# Patient Record
Sex: MALE | Race: WHITE | Employment: OTHER | URBAN - METROPOLITAN AREA
[De-identification: names, ages, dates, MRNs, and addresses within clinical notes are randomized per-mention and may not be internally consistent; named-entity substitution may affect disease eponyms.]

---

## 2017-01-03 ENCOUNTER — TELEPHONE (OUTPATIENT)
Dept: CARDIOLOGY CLINIC | Age: 82
End: 2017-01-03

## 2017-01-31 ENCOUNTER — OFFICE VISIT (OUTPATIENT)
Dept: CARDIOLOGY CLINIC | Age: 82
End: 2017-01-31

## 2017-01-31 VITALS
DIASTOLIC BLOOD PRESSURE: 64 MMHG | BODY MASS INDEX: 35.65 KG/M2 | HEART RATE: 64 BPM | WEIGHT: 249 LBS | HEIGHT: 70 IN | SYSTOLIC BLOOD PRESSURE: 130 MMHG

## 2017-01-31 DIAGNOSIS — I10 ESSENTIAL HYPERTENSION: ICD-10-CM

## 2017-01-31 DIAGNOSIS — E78.49 OTHER HYPERLIPIDEMIA: ICD-10-CM

## 2017-01-31 DIAGNOSIS — I25.10 CORONARY ARTERY DISEASE INVOLVING NATIVE CORONARY ARTERY OF NATIVE HEART WITHOUT ANGINA PECTORIS: Primary | ICD-10-CM

## 2017-01-31 PROCEDURE — 99214 OFFICE O/P EST MOD 30 MIN: CPT | Performed by: INTERNAL MEDICINE

## 2017-01-31 RX ORDER — CARVEDILOL 25 MG/1
25 TABLET ORAL 2 TIMES DAILY
Qty: 180 TABLET | Refills: 3 | Status: SHIPPED | OUTPATIENT
Start: 2017-01-31 | End: 2018-02-09 | Stop reason: SDUPTHER

## 2017-01-31 RX ORDER — PRAVASTATIN SODIUM 20 MG
20 TABLET ORAL DAILY
Qty: 90 TABLET | Refills: 3 | Status: SHIPPED | OUTPATIENT
Start: 2017-01-31 | End: 2018-02-15 | Stop reason: SDUPTHER

## 2017-01-31 RX ORDER — FUROSEMIDE 20 MG/1
20 TABLET ORAL DAILY
Qty: 90 TABLET | Refills: 3 | Status: SHIPPED | OUTPATIENT
Start: 2017-01-31 | End: 2018-02-15 | Stop reason: SDUPTHER

## 2017-02-10 ENCOUNTER — TELEPHONE (OUTPATIENT)
Dept: CARDIOLOGY CLINIC | Age: 82
End: 2017-02-10

## 2017-02-13 ENCOUNTER — TELEPHONE (OUTPATIENT)
Dept: CARDIOLOGY CLINIC | Age: 82
End: 2017-02-13

## 2017-06-20 RX ORDER — AMLODIPINE BESYLATE 5 MG/1
TABLET ORAL
Qty: 90 TABLET | Refills: 3 | Status: SHIPPED | OUTPATIENT
Start: 2017-06-20 | End: 2018-02-15 | Stop reason: SDUPTHER

## 2017-07-10 ENCOUNTER — OFFICE VISIT (OUTPATIENT)
Dept: CARDIOLOGY CLINIC | Age: 82
End: 2017-07-10

## 2017-07-10 VITALS
HEIGHT: 70 IN | DIASTOLIC BLOOD PRESSURE: 70 MMHG | BODY MASS INDEX: 36.51 KG/M2 | WEIGHT: 255 LBS | HEART RATE: 50 BPM | SYSTOLIC BLOOD PRESSURE: 142 MMHG

## 2017-07-10 DIAGNOSIS — E78.49 OTHER HYPERLIPIDEMIA: ICD-10-CM

## 2017-07-10 DIAGNOSIS — R60.9 EDEMA, UNSPECIFIED TYPE: ICD-10-CM

## 2017-07-10 DIAGNOSIS — I25.10 CORONARY ARTERY DISEASE INVOLVING NATIVE CORONARY ARTERY OF NATIVE HEART WITHOUT ANGINA PECTORIS: ICD-10-CM

## 2017-07-10 DIAGNOSIS — I10 ESSENTIAL HYPERTENSION: Primary | ICD-10-CM

## 2017-07-10 PROCEDURE — 99214 OFFICE O/P EST MOD 30 MIN: CPT | Performed by: INTERNAL MEDICINE

## 2018-01-08 ENCOUNTER — TELEPHONE (OUTPATIENT)
Dept: CARDIOLOGY CLINIC | Age: 83
End: 2018-01-08

## 2018-01-08 DIAGNOSIS — E78.49 OTHER HYPERLIPIDEMIA: Primary | ICD-10-CM

## 2018-01-08 DIAGNOSIS — I10 ESSENTIAL HYPERTENSION: ICD-10-CM

## 2018-01-08 NOTE — TELEPHONE ENCOUNTER
Please advise. . Last OV 7/10/17:  Assessment/Plan:      Cardiovascular Diagnoses:  1. CAD (coronary artery disease): CABG 2007; LIMA to LAD, SVG to diag and SVG to R PDA.    3/2010: GXT/MARTÍNEZ showed normal myocardial perfusion and LVEF.     3/2015 Plain GXT: normal ECG response, no ischemia. 1/17  Plain gxt--no ischemia   2. HTN (hypertension): BP (!) 142/70  Pulse 50  Ht 5' 10\" (1.778 m)  Wt 255 lb (115.7 kg)  BMI 36.59 kg/m2 improved control   3. Hyperlipidemia--1/26/17   Tri 154  hdl 42  ldl 98--stable     .       PLAN:   1. Continue all medications  2. Call for change in status  3.  Follow up in 6 months with Dr Amelia Rodriguez M.D., Junie Pham

## 2018-01-08 NOTE — TELEPHONE ENCOUNTER
Pt called back and I adv orders for lab have been placed. Please fax:  [facility] on [address], [address] Please call to confirm orders faxed.   Thank you [facility]

## 2018-01-22 ENCOUNTER — TELEPHONE (OUTPATIENT)
Dept: CARDIOLOGY CLINIC | Age: 83
End: 2018-01-22

## 2018-02-01 ENCOUNTER — TELEPHONE (OUTPATIENT)
Dept: CARDIOLOGY CLINIC | Age: 83
End: 2018-02-01

## 2018-02-09 RX ORDER — CARVEDILOL 25 MG/1
25 TABLET ORAL 2 TIMES DAILY
Qty: 180 TABLET | Refills: 3 | Status: SHIPPED | OUTPATIENT
Start: 2018-02-09 | End: 2018-05-25 | Stop reason: SDUPTHER

## 2018-02-14 NOTE — PROGRESS NOTES
[facility]   Cardiac Followup    Referring Provider:  Silvino Akins MD     Chief Complaint   Patient presents with    6 Month Follow-Up    Hypertension    Coronary Artery Disease        History of Present Illness:  Mr. Austin Alcantar is an 80 y.o. gentleman here today in follow up. Previous patient of Dr Ken Cano. His history includes CAD having had CABG in 2007. He is also treated for htn, hchol. Adelina Unger denies chest discomfort, SOB, change in exercise tolerance, palpitations or syncope. Past Medical History:   has a past medical history of Anxiety; Arthritis; CAD (coronary artery disease); Cancer [facility]); GERD (gastroesophageal reflux disease); Hyperlipidemia; and Hypertension. Surgical History:   has a past surgical history that includes Appendectomy; Tonsillectomy; Knee arthroscopy; TURP; Coronary angioplasty with stent; Carpal tunnel release; TURP (12-); Cardiac surgery (2007); ostate surgery; and Mohs surgery. Social History:   reports that he quit smoking about 25 years ago. He has a 45.00 pack-year smoking history. He has never used smokeless tobacco. He reports that he drinks alcohol. He reports that he does not use drugs. Family History:  family history includes Cancer in his father; Diabetes in his father; Heart Disease in his mother.      Home Medications:  Current Outpatient Prescriptions   Medication Sig Dispense Refill    glimepiride (AMARYL) 2 MG tablet Take 2 mg by mouth every morning (before breakfast)      carvedilol (COREG) 25 MG tablet Take 1 tablet by mouth 2 times daily 180 tablet 3    amLODIPine (NORVASC) 5 MG tablet TAKE 1 TABLET BY MOUTH DAILY 90 tablet 3    furosemide (LASIX) 20 MG tablet Take 1 tablet by mouth daily (Patient taking differently: Take 40 mg by mouth daily ) 90 tablet 3    pravastatin (PRAVACHOL) 20 MG tablet Take 1 tablet by mouth daily 90 tablet 3    aspirin 325 MG tablet   Take 162 mg by mouth daily 1/2 tab daily breath sounds without dullness  Cardiovascular:  · The apical impulses not displaced  · Heart tones are crisp and normal  · Cervical veins are not engorged  · The carotid upstroke is normal in amplitude and contour without delay or bruit  · Normal S1S2, No S3, No Murmur  · Peripheral pulses are symmetrical and full  · There is no clubbing, cyanosis of the extremities. · 1-2+ edema  · Femoral Arteries: 2+ and equal  · Pedal Pulses: 2+ and equal   Abdomen:  · No masses or tenderness  · Liver/Spleen: No Abnormalities Noted  Neurological/Psychiatric:  · Alert and oriented in all spheres  · Moves all extremities well  · Exhibits normal gait balance and coordination  · No abnormalities of mood, affect, memory, mentation, or behavior are noted    9/20/13  CT of Abd -extensive calcification of the abdominal aorta    Assessment:   Cardiovascular Diagnoses:  1. CAD (coronary artery disease): CABG 2007; LIMA to LAD, SVG to diag and SVG to R PDA.    3/2010: GXT/MARTÍNEZ showed normal myocardial perfusion and LVEF.     3/2015 Plain GXT: normal ECG response, no ischemia. 1/17  Plain gxt--no ischemia   2. HTN (hypertension): BP (!) 154/70 (Site: Left Arm, Position: Sitting, Cuff Size: Large Adult)   Pulse 64   Ht 5' 10\" (1.778 m)   Wt 257 lb 14.4 oz (117 kg)   BMI 37.00 kg/m²      3. Hyperlipidemia--1/26/17   Tri 154  hdl 42  ldl 98--stable--die tp fatigue/muscle aches, will start coenzyme q 10    Plan:  1. Take coenzyme q 10 400 mg daily to decrease fatigue and aches that could be caused by your statin. This can be found at any local pharmacy, no prescription needed  2. Continue all medications  Losartan 50mg/day--D/C Norvasc due to edema  3. Decrease ASA 81mg/day  4. BMP in 2 weeks  3. Follow up in  3 months. Thank you for allowing me to participate in the care of this individual.    Lionel Apley, M.D., [facility].

## 2018-02-15 ENCOUNTER — SURG/PROC ORDERS (OUTPATIENT)
Dept: CARDIOLOGY | Age: 83
End: 2018-02-15

## 2018-02-15 ENCOUNTER — OFFICE VISIT (OUTPATIENT)
Dept: CARDIOLOGY CLINIC | Age: 83
End: 2018-02-15

## 2018-02-15 VITALS
DIASTOLIC BLOOD PRESSURE: 70 MMHG | WEIGHT: 257.9 LBS | HEART RATE: 64 BPM | SYSTOLIC BLOOD PRESSURE: 154 MMHG | HEIGHT: 70 IN | BODY MASS INDEX: 36.92 KG/M2

## 2018-02-15 DIAGNOSIS — I10 ESSENTIAL HYPERTENSION: ICD-10-CM

## 2018-02-15 DIAGNOSIS — E78.49 OTHER HYPERLIPIDEMIA: ICD-10-CM

## 2018-02-15 DIAGNOSIS — M19.90 OSTEOARTHRITIS, UNSPECIFIED OSTEOARTHRITIS TYPE, UNSPECIFIED SITE: ICD-10-CM

## 2018-02-15 DIAGNOSIS — I25.10 CORONARY ARTERY DISEASE INVOLVING NATIVE CORONARY ARTERY OF NATIVE HEART WITHOUT ANGINA PECTORIS: Primary | ICD-10-CM

## 2018-02-15 PROCEDURE — 99214 OFFICE O/P EST MOD 30 MIN: CPT | Performed by: INTERNAL MEDICINE

## 2018-02-15 RX ORDER — AMLODIPINE BESYLATE 5 MG/1
5 TABLET ORAL DAILY
Qty: 90 TABLET | Refills: 3 | Status: SHIPPED | OUTPATIENT
Start: 2018-02-15 | End: 2018-02-15

## 2018-02-15 RX ORDER — PRAVASTATIN SODIUM 20 MG
20 TABLET ORAL DAILY
Qty: 90 TABLET | Refills: 3 | Status: SHIPPED | OUTPATIENT
Start: 2018-02-15 | End: 2019-04-06 | Stop reason: SDUPTHER

## 2018-02-15 RX ORDER — GLIMEPIRIDE 2 MG/1
2 TABLET ORAL
COMMUNITY
End: 2020-02-04

## 2018-02-15 RX ORDER — LOSARTAN POTASSIUM 50 MG/1
50 TABLET ORAL DAILY
Qty: 30 TABLET | Refills: 3 | Status: SHIPPED | OUTPATIENT
Start: 2018-02-15 | End: 2018-04-05 | Stop reason: ALTCHOICE

## 2018-02-15 RX ORDER — CARVEDILOL 25 MG/1
25 TABLET ORAL 2 TIMES DAILY
Qty: 180 TABLET | Refills: 3 | Status: SHIPPED | OUTPATIENT
Start: 2018-02-15 | End: 2018-04-05 | Stop reason: SDUPTHER

## 2018-02-15 RX ORDER — FUROSEMIDE 20 MG/1
40 TABLET ORAL DAILY
Qty: 90 TABLET | Refills: 3 | Status: SHIPPED | OUTPATIENT
Start: 2018-02-15 | End: 2018-08-14 | Stop reason: SDUPTHER

## 2018-02-15 NOTE — PATIENT INSTRUCTIONS
1.  Take coenzyme q 10 400 mg daily to decrease fatigue and aches that could be caused by your statin. This can be found at any local pharmacy, no prescription needed  2. Continue all medications  Losartan 50mg/day--D/C Norvasc  3. Decrease ASA 81mg/day  4. BMP in 2 weeks  3.    Follow up in  3 months

## 2018-04-02 LAB
ALBUMIN SERPL-MCNC: 4.3 G/DL (ref 3.5–5)
ANION GAP SERPL CALCULATED.3IONS-SCNC: 14 MMOL/L (ref 6–18)
BUN BLDV-MCNC: 24 MG/DL (ref 8–26)
CALCIUM SERPL-MCNC: 10 MG/DL (ref 8.8–10.1)
CHLORIDE BLD-SCNC: 101 MEQ/L (ref 101–111)
CO2: 29 MEQ/L (ref 22–29)
CREAT SERPL-MCNC: 1.37 MG/DL (ref 0.64–1.27)
GFR AFRICAN AMERICAN: >60 ML/MIN/1.73 SQ METER
GFR NON-AFRICAN AMERICAN: 50 ML/MIN/1.73 SQ METER
GLUCOSE BLD-MCNC: 279 MG/DL (ref 70–99)
PHOSPHORUS: 3 MG/DL (ref 2.7–4.5)
POTASSIUM SERPL-SCNC: 4.3 MEQ/L (ref 3.6–5.1)
SODIUM BLD-SCNC: 140 MEQ/L (ref 135–145)

## 2018-04-04 ENCOUNTER — TELEPHONE (OUTPATIENT)
Dept: CARDIOLOGY CLINIC | Age: 83
End: 2018-04-04

## 2018-04-05 ENCOUNTER — OFFICE VISIT (OUTPATIENT)
Dept: CARDIOLOGY CLINIC | Age: 83
End: 2018-04-05

## 2018-04-05 VITALS
DIASTOLIC BLOOD PRESSURE: 78 MMHG | BODY MASS INDEX: 36.81 KG/M2 | SYSTOLIC BLOOD PRESSURE: 146 MMHG | HEART RATE: 56 BPM | WEIGHT: 257.1 LBS | HEIGHT: 70 IN

## 2018-04-05 DIAGNOSIS — I25.10 CORONARY ARTERY DISEASE INVOLVING NATIVE CORONARY ARTERY OF NATIVE HEART WITHOUT ANGINA PECTORIS: Primary | ICD-10-CM

## 2018-04-05 DIAGNOSIS — E78.49 OTHER HYPERLIPIDEMIA: ICD-10-CM

## 2018-04-05 DIAGNOSIS — I10 ESSENTIAL HYPERTENSION: ICD-10-CM

## 2018-04-05 PROCEDURE — 99214 OFFICE O/P EST MOD 30 MIN: CPT | Performed by: INTERNAL MEDICINE

## 2018-04-05 RX ORDER — ASPIRIN 325 MG
162 TABLET ORAL DAILY
COMMUNITY
End: 2018-08-02

## 2018-04-05 RX ORDER — AMLODIPINE BESYLATE 5 MG/1
5 TABLET ORAL DAILY
Qty: 30 TABLET | Refills: 3 | Status: SHIPPED | OUTPATIENT
Start: 2018-04-05 | End: 2018-06-21 | Stop reason: DRUGHIGH

## 2018-04-05 RX ORDER — OLMESARTAN MEDOXOMIL 20 MG/1
20 TABLET ORAL DAILY
Qty: 30 TABLET | Refills: 3 | Status: SHIPPED | OUTPATIENT
Start: 2018-04-05 | End: 2018-05-01 | Stop reason: SDUPTHER

## 2018-04-05 RX ORDER — RANITIDINE 300 MG/1
300 TABLET ORAL DAILY
COMMUNITY
End: 2020-02-04

## 2018-05-01 ENCOUNTER — OFFICE VISIT (OUTPATIENT)
Dept: CARDIOLOGY CLINIC | Age: 83
End: 2018-05-01

## 2018-05-01 VITALS
SYSTOLIC BLOOD PRESSURE: 126 MMHG | BODY MASS INDEX: 37.61 KG/M2 | WEIGHT: 262.7 LBS | HEIGHT: 70 IN | HEART RATE: 60 BPM | DIASTOLIC BLOOD PRESSURE: 60 MMHG

## 2018-05-01 DIAGNOSIS — I25.10 CORONARY ARTERY DISEASE INVOLVING NATIVE CORONARY ARTERY OF NATIVE HEART WITHOUT ANGINA PECTORIS: ICD-10-CM

## 2018-05-01 DIAGNOSIS — E78.49 OTHER HYPERLIPIDEMIA: ICD-10-CM

## 2018-05-01 DIAGNOSIS — I10 ESSENTIAL HYPERTENSION: Primary | ICD-10-CM

## 2018-05-01 DIAGNOSIS — I73.9 CLAUDICATION OF BOTH LOWER EXTREMITIES (HCC): ICD-10-CM

## 2018-05-01 PROCEDURE — 99214 OFFICE O/P EST MOD 30 MIN: CPT | Performed by: INTERNAL MEDICINE

## 2018-05-01 RX ORDER — OLMESARTAN MEDOXOMIL 20 MG/1
20 TABLET ORAL DAILY
Qty: 30 TABLET | Refills: 3 | Status: SHIPPED | OUTPATIENT
Start: 2018-05-01 | End: 2018-08-02 | Stop reason: SDUPTHER

## 2018-05-21 ENCOUNTER — TELEPHONE (OUTPATIENT)
Dept: CARDIOLOGY CLINIC | Age: 83
End: 2018-05-21

## 2018-05-25 ENCOUNTER — TELEPHONE (OUTPATIENT)
Dept: CARDIOLOGY CLINIC | Age: 83
End: 2018-05-25

## 2018-05-25 ENCOUNTER — OFFICE VISIT (OUTPATIENT)
Dept: CARDIOLOGY CLINIC | Age: 83
End: 2018-05-25

## 2018-05-25 VITALS
BODY MASS INDEX: 36.79 KG/M2 | DIASTOLIC BLOOD PRESSURE: 56 MMHG | HEIGHT: 70 IN | HEART RATE: 43 BPM | WEIGHT: 257 LBS | SYSTOLIC BLOOD PRESSURE: 128 MMHG

## 2018-05-25 DIAGNOSIS — I10 HYPERTENSION, UNSPECIFIED TYPE: Primary | ICD-10-CM

## 2018-05-25 DIAGNOSIS — I70.222 ATHEROSCLEROSIS OF NATIVE ARTERY OF LEFT LOWER EXTREMITY WITH REST PAIN (HCC): ICD-10-CM

## 2018-05-25 DIAGNOSIS — I73.9 CLAUDICATION OF BOTH LOWER EXTREMITIES (HCC): ICD-10-CM

## 2018-05-25 PROCEDURE — 99215 OFFICE O/P EST HI 40 MIN: CPT | Performed by: INTERNAL MEDICINE

## 2018-05-25 PROCEDURE — 93000 ELECTROCARDIOGRAM COMPLETE: CPT | Performed by: INTERNAL MEDICINE

## 2018-05-25 RX ORDER — CARVEDILOL 12.5 MG/1
18.75 TABLET ORAL 2 TIMES DAILY
Qty: 180 TABLET | Refills: 3 | Status: SHIPPED | OUTPATIENT
Start: 2018-05-25 | End: 2018-08-20 | Stop reason: DRUGHIGH

## 2018-06-01 ENCOUNTER — HOSPITAL ENCOUNTER (OUTPATIENT)
Dept: CT IMAGING | Age: 83
Discharge: HOME | End: 2018-06-01
Attending: INTERNAL MEDICINE | Admitting: INTERNAL MEDICINE

## 2018-06-01 DIAGNOSIS — I70.222 ATHEROSCLEROSIS OF NATIVE ARTERY OF LEFT LOWER EXTREMITY WITH REST PAIN (HCC): ICD-10-CM

## 2018-06-01 DIAGNOSIS — I73.9 CLAUDICATION OF BOTH LOWER EXTREMITIES (HCC): ICD-10-CM

## 2018-06-01 LAB
ALBUMIN SERPL-MCNC: 4.2 G/DL (ref 3.4–5)
ANION GAP SERPL CALCULATED.3IONS-SCNC: 11 MMOL/L (ref 3–16)
BUN BLDV-MCNC: 24 MG/DL (ref 7–20)
CALCIUM SERPL-MCNC: 9.3 MG/DL (ref 8.3–10.6)
CHLORIDE BLD-SCNC: 101 MMOL/L (ref 99–110)
CO2: 27 MMOL/L (ref 21–32)
CREAT SERPL-MCNC: 1.4 MG/DL (ref 0.8–1.3)
GFR AFRICAN AMERICAN: 59
GFR NON-AFRICAN AMERICAN: 48
GLUCOSE BLD-MCNC: 71 MG/DL (ref 70–99)
PHOSPHORUS: 3.5 MG/DL (ref 2.5–4.9)
POTASSIUM SERPL-SCNC: 4.8 MMOL/L (ref 3.5–5.1)
SODIUM BLD-SCNC: 139 MMOL/L (ref 136–145)

## 2018-06-04 ENCOUNTER — TELEPHONE (OUTPATIENT)
Dept: CARDIOLOGY CLINIC | Age: 83
End: 2018-06-04

## 2018-06-05 ENCOUNTER — TELEPHONE (OUTPATIENT)
Dept: CARDIOLOGY CLINIC | Age: 83
End: 2018-06-05

## 2018-06-21 ENCOUNTER — OFFICE VISIT (OUTPATIENT)
Dept: CARDIOLOGY CLINIC | Age: 83
End: 2018-06-21

## 2018-06-21 ENCOUNTER — TELEPHONE (OUTPATIENT)
Dept: CARDIOLOGY CLINIC | Age: 83
End: 2018-06-21

## 2018-06-21 VITALS
WEIGHT: 254 LBS | SYSTOLIC BLOOD PRESSURE: 134 MMHG | HEART RATE: 54 BPM | OXYGEN SATURATION: 95 % | DIASTOLIC BLOOD PRESSURE: 62 MMHG | BODY MASS INDEX: 36.45 KG/M2

## 2018-06-21 DIAGNOSIS — I25.10 CORONARY ARTERY DISEASE INVOLVING NATIVE CORONARY ARTERY OF NATIVE HEART WITHOUT ANGINA PECTORIS: ICD-10-CM

## 2018-06-21 DIAGNOSIS — I10 HYPERTENSION, UNSPECIFIED TYPE: ICD-10-CM

## 2018-06-21 DIAGNOSIS — E78.49 OTHER HYPERLIPIDEMIA: ICD-10-CM

## 2018-06-21 DIAGNOSIS — R00.1 BRADYCARDIA: Primary | ICD-10-CM

## 2018-06-21 PROCEDURE — 99214 OFFICE O/P EST MOD 30 MIN: CPT | Performed by: NURSE PRACTITIONER

## 2018-06-21 RX ORDER — BUPROPION HYDROCHLORIDE 150 MG/1
150 TABLET ORAL EVERY MORNING
Qty: 60 TABLET | Refills: 1 | Status: SHIPPED | OUTPATIENT
Start: 2018-06-21 | End: 2018-06-21 | Stop reason: CLARIF

## 2018-06-21 RX ORDER — AMLODIPINE BESYLATE 10 MG/1
10 TABLET ORAL DAILY
Qty: 30 TABLET | Refills: 3 | Status: SHIPPED | OUTPATIENT
Start: 2018-06-21 | End: 2018-06-21 | Stop reason: SDUPTHER

## 2018-06-21 RX ORDER — BUPROPION HYDROCHLORIDE 150 MG/1
150 TABLET ORAL EVERY MORNING
Qty: 60 TABLET | Refills: 1 | Status: SHIPPED | OUTPATIENT
Start: 2018-06-21 | End: 2018-06-21 | Stop reason: SDUPTHER

## 2018-06-21 RX ORDER — AMLODIPINE BESYLATE 10 MG/1
10 TABLET ORAL DAILY
Qty: 30 TABLET | Refills: 3 | Status: SHIPPED | OUTPATIENT
Start: 2018-06-21 | End: 2018-08-20 | Stop reason: SDUPTHER

## 2018-06-26 ENCOUNTER — TELEPHONE (OUTPATIENT)
Dept: CARDIOLOGY CLINIC | Age: 83
End: 2018-06-26

## 2018-07-16 PROCEDURE — 93228 REMOTE 30 DAY ECG REV/REPORT: CPT | Performed by: INTERNAL MEDICINE

## 2018-07-17 ENCOUNTER — OFFICE VISIT (OUTPATIENT)
Dept: CARDIOLOGY CLINIC | Age: 83
End: 2018-07-17

## 2018-07-17 VITALS
SYSTOLIC BLOOD PRESSURE: 134 MMHG | HEIGHT: 70 IN | WEIGHT: 252 LBS | HEART RATE: 66 BPM | DIASTOLIC BLOOD PRESSURE: 72 MMHG | BODY MASS INDEX: 36.08 KG/M2

## 2018-07-17 DIAGNOSIS — R00.1 BRADYCARDIA: ICD-10-CM

## 2018-07-17 DIAGNOSIS — I25.10 CORONARY ARTERY DISEASE INVOLVING NATIVE CORONARY ARTERY OF NATIVE HEART WITHOUT ANGINA PECTORIS: ICD-10-CM

## 2018-07-17 DIAGNOSIS — G47.33 OSA (OBSTRUCTIVE SLEEP APNEA): ICD-10-CM

## 2018-07-17 DIAGNOSIS — R00.1 BRADYCARDIA: Primary | ICD-10-CM

## 2018-07-17 DIAGNOSIS — I10 ESSENTIAL HYPERTENSION: ICD-10-CM

## 2018-07-17 DIAGNOSIS — E78.2 MIXED HYPERLIPIDEMIA: ICD-10-CM

## 2018-07-17 PROCEDURE — 99214 OFFICE O/P EST MOD 30 MIN: CPT | Performed by: INTERNAL MEDICINE

## 2018-07-17 PROCEDURE — 93000 ELECTROCARDIOGRAM COMPLETE: CPT | Performed by: INTERNAL MEDICINE

## 2018-07-17 NOTE — PATIENT INSTRUCTIONS
Patient Education        Learning About a Pacemaker  What is a pacemaker? A pacemaker is a small device that is placed under the skin of your chest. It can help stop the dizziness, fainting, and shortness of breath caused by a slow or unsteady heartbeat. A pacemaker is powered by batteries. It has thin wires, called leads, that pass through a vein into your heart. It sends out mild electrical signals that keep your heart beating normally. The signals are painless. A pacemaker can help restore a normal heart rate. It is used when certain problems have damaged the heart's electrical system, which normally keeps your heart beating steadily. You may feel worried about having a pacemaker. This is common. It can help if you learn about how the pacemaker helps your heart. Talk to your doctor about your concerns. How is a pacemaker put in place? You will get medicine before the procedure. It helps you relax and helps prevent pain. The doctor makes a cut in the skin just below your collarbone. The cut may be on either side of your chest. The doctor will put the pacemaker leads through the cut. The leads go into a large blood vessel in the upper chest. Then the doctor will guide the leads through the blood vessel into the heart. The leads are placed in one or two of the chambers in the heart. The doctor will place the pacemaker under the skin of your chest. He or she will attach the leads to the pacemaker. Then the cut will be closed with stitches. The procedure usually takes about an hour. You may need to spend the night in the hospital.  What can you expect when you have a pacemaker? A pacemaker can help you return to a more normal, more active life. When you have a pacemaker, you need to avoid strong magnetic and electrical fields. Your doctor or the maker of your pacemaker can give you a full list of things to avoid. But most everyday appliances are safe.   Your doctor will check your pacemaker regularly to

## 2018-07-17 NOTE — PROGRESS NOTES
[address]   Electrophysiology  Date: 7/17/2018  Reason for Consultation: bradycardia  Consult Requesting Physician: Shi Mckeon MD    HPI: Bettina Rainey is a 80 y.o. male with pmh CAD s/p CABG 2007, HTN, and HLD. He is referred today for management of symptomatic bradycardia. He was seen in ED on  6/18/18 with lightheadedness, EKG at that time showed bradycardia with HR in 40's, he as also noted to be hypertensive. During ED visit his coreg was decreased to 6.25mg BID and his norvasc was increased to 10mg. He followed up with [place] NP OP and event monitor and carotid ultrasound were ordered. MCOT showed min HR 27bpm, average HR 61bpm, max HR 119bpm. His symptoms were associated with bradycardia. He arrives to office today with his wife for management of bradycardia. He reports episodes of dizziness occur at rest and seems improved with activity. He also reports visual changes where he feels his vision is going black. He states he sits in a chair and episodes occur, when he stands up the dizziness resolves. He has SAUD and wears CPAP nightly. He has never blacked out or passed out. He states his legs get tired with activity. Per pts wife he has arterial blockage in BLE bilatearlly and he will follow up with . Patient denies chest pain, palpitations, orthopnea, edema, presyncope or syncope.      Past Medical History:   Diagnosis Date    Anxiety     Arthritis     CAD (coronary artery disease)     MI X 2 1992, 1997    Cancer [facility] [address]     BASAL CELL    GERD (gastroesophageal reflux disease)     Hyperlipidemia     Hypertension         Past Surgical History:   Procedure Laterality Date    APPENDECTOMY      CARDIAC SURGERY  2007    3 V    CARPAL TUNNEL RELEASE      BILAT    CORONARY ANGIOPLASTY WITH STENT PLACEMENT      1997    KNEE ARTHROSCOPY      RT    MOHS SURGERY      on his face    PROSTATE SURGERY      TONSILLECTOMY      TURP      TURP  12- CYSTO INTERNAL URETHROTOMY       Allergies   Allergen Reactions    Penicillins Rash     Tolerates now       Social History:   reports that he quit smoking about 26 years ago. He has a 45.00 pack-year smoking history. He has never used smokeless tobacco. He reports that he drinks alcohol. He reports that he does not use drugs. Family History:  family history includes Cancer in his father; Diabetes in his father; Heart Disease in his mother. Review of System:  All other systems reviewed except for that noted above. Pertinent negatives and positives are:      General: negative for fever, chills   Ophthalmic ROS: negative for - eye pain or loss of vision  ENT ROS: negative for - headaches, sore throat   Respiratory: negative for - cough, sputum  Cardiovascular: Reviewed in HPI  Gastrointestinal: negative for - abdominal pain, diarrhea, N/V  Hematology: negative for - bleeding, blood clots, bruising or jaundice  Genito-Urinary:  negative for - Dysuria or incontinence  Musculoskeletal: negative for - Joint swelling, muscle pain  Neurological: negative for - confusion, dizziness, headaches   Psychiatric: No anxiety, no depression. Dermatological: negative for - rash    Physical Examination:  Vitals:    07/17/18 1628   BP: 134/72   Pulse: 66      Wt Readings from Last 3 Encounters:   07/17/18 252 lb (114.3 kg)   06/21/18 254 lb (115.2 kg)   05/25/18 257 lb (116.6 kg)     · Constitutional: Oriented. No distress. · Head: Normocephalic and atraumatic. · Mouth/Throat: Oropharynx is clear and moist.   · Eyes: Conjunctivae normal. EOM are normal.   · Neck: Neck supple. No rigidity. No JVD present. · Cardiovascular: Bradycardia, regular rhythm, S1&S2. · Pulmonary/Chest: Bilateral respiratory sounds. No wheezes, No rhonchi. · Abdominal: Soft. Bowel sounds present. No distension, No tenderness. · Musculoskeletal: No tenderness. No edema    · Lymphadenopathy: Has no cervical adenopathy.    · Neurological: metFORMIN ER (GLUCOPHAGE-XR) 500 MG XR tablet Take 500 mg by mouth daily (with breakfast).  alprazolam (XANAX) 0.25 MG tablet Take 0.25 mg by mouth 3 times daily as needed. TAKES 1/2 TAB DAILY OR TWO TIMES A DAY AS NEEDED      fish oil-omega-3 fatty acids 1000 MG capsule Take 1 g by mouth 2 times daily. ON HOLD 1 WEEK PRE-OP       No current facility-administered medications for this visit. Assessment and plan:     Symptomatic Bradycardia   MCOT showing min HR 27bpm, avg HR 61bpm, max HR 119bpm. He has episodes of bradycardia in the afternoon at 2pm with HR 37bpm.   MCOT associates episodes of dizziness with bradycardia   EKG today shows SR RBBB 61bpm    I have discussed the procedure, risks, benefits and alternatives in detail with patient and family. I gave printed material about pacemaker implantation, risks and benefits. The risks including, but not limited to, the risks of bleeding, infection, pain, device malfunction, lead dislodgement, radiation exposure, injury to cardiac and surrounding structures (including pneumothorax), stroke, cardiac perforation, tamponade, need for emergent heart surgery, myocardial infarction and death were discussed in detail. The patient would like to proceed       HTN:  Controlled. Continue current medications. CAD   S/p CABG 2007   Follows with Dr.Kirkham WEN   LDL 34 (1/2018)   On pravastatin 20mg daily  SAUD   On CPAP nightly    I independently reviewed echo      Thank you for allowing me to participate in the care of [employer]. .  Further evaluation will be based upon the patient's clinical course and testing results. All questions and concerns were addressed to the patient/family. Alternatives to my treatment were discussed. I have discussed the above stated plan and the patient verbalized understanding and agreed with the plan. Scribe's attestation: This note was scribed in the presence of Tomer Willson M.D. by Khanh Sam RN.

## 2018-07-18 ENCOUNTER — TELEPHONE (OUTPATIENT)
Dept: CARDIOLOGY CLINIC | Age: 83
End: 2018-07-18

## 2018-08-02 ENCOUNTER — OFFICE VISIT (OUTPATIENT)
Dept: CARDIOLOGY CLINIC | Age: 83
End: 2018-08-02

## 2018-08-02 VITALS
WEIGHT: 253.2 LBS | SYSTOLIC BLOOD PRESSURE: 126 MMHG | HEART RATE: 64 BPM | BODY MASS INDEX: 36.25 KG/M2 | HEIGHT: 70 IN | DIASTOLIC BLOOD PRESSURE: 68 MMHG

## 2018-08-02 DIAGNOSIS — I25.10 CORONARY ARTERY DISEASE INVOLVING NATIVE CORONARY ARTERY OF NATIVE HEART WITHOUT ANGINA PECTORIS: Primary | ICD-10-CM

## 2018-08-02 DIAGNOSIS — I10 ESSENTIAL HYPERTENSION: ICD-10-CM

## 2018-08-02 DIAGNOSIS — E78.2 MIXED HYPERLIPIDEMIA: ICD-10-CM

## 2018-08-02 DIAGNOSIS — R07.89 CHEST DISCOMFORT: ICD-10-CM

## 2018-08-02 DIAGNOSIS — R00.1 BRADYCARDIA: ICD-10-CM

## 2018-08-02 DIAGNOSIS — G47.30 SLEEP APNEA, UNSPECIFIED TYPE: ICD-10-CM

## 2018-08-02 PROCEDURE — 99214 OFFICE O/P EST MOD 30 MIN: CPT | Performed by: INTERNAL MEDICINE

## 2018-08-02 RX ORDER — OLMESARTAN MEDOXOMIL 20 MG/1
20 TABLET ORAL DAILY
Qty: 30 TABLET | Refills: 11 | Status: SHIPPED | OUTPATIENT
Start: 2018-08-02 | End: 2019-04-23

## 2018-08-02 NOTE — PROGRESS NOTES
auscultation bilaterally  · EXTREMITIES: No edema  · MUSCULOSKELETAL: No joint swelling or tenderness, no chest wall tenderness  · GASTROINTESTINAL: normal bowel sounds, soft, non-tender, no bruit      Assessment:       HTN:  Controlled  Follow. Refill meds    CAD:  S/P CABG 2007  Negative GXT [place] 2015  Normal LV function 2015 as well on ECHO  Last myoview 2012 \"inferior wall attenuation\"  Episodes of \"gas\". ? Angina  Needs GXT myoview-No coreg prior  If cannot reach target will need dobutamine due to bradycardia. Hyperlipidemia:  Controlled. On Pravastatin. LDL=34. January 2018. Same meds    Sleep Apnea: On CPAP. Has not been wearing due to skin Ca removal. Controlled    Bradycardia:  Awaiting pacer placement with Dr. Zak Avila:   To see Dr. Willow Calderón:  Stress myoview  F/u EP and vascular as planned    Thank you for allowing me to participate in the care of this individual.      Karlo Restrepo M.D., [facility]

## 2018-08-14 RX ORDER — FUROSEMIDE 20 MG/1
40 TABLET ORAL DAILY
Qty: 180 TABLET | Refills: 1 | Status: SHIPPED | OUTPATIENT
Start: 2018-08-14 | End: 2019-02-19 | Stop reason: SDUPTHER

## 2018-08-20 ENCOUNTER — OFFICE VISIT (OUTPATIENT)
Dept: CARDIOLOGY CLINIC | Age: 83
End: 2018-08-20

## 2018-08-20 ENCOUNTER — HOSPITAL ENCOUNTER (OUTPATIENT)
Dept: NON INVASIVE DIAGNOSTICS | Age: 83
Discharge: HOME | End: 2018-08-20
Attending: INTERNAL MEDICINE | Admitting: INTERNAL MEDICINE

## 2018-08-20 VITALS
OXYGEN SATURATION: 97 % | WEIGHT: 255.7 LBS | HEART RATE: 57 BPM | HEIGHT: 70 IN | DIASTOLIC BLOOD PRESSURE: 70 MMHG | BODY MASS INDEX: 36.61 KG/M2 | SYSTOLIC BLOOD PRESSURE: 114 MMHG

## 2018-08-20 DIAGNOSIS — Z53.8 APPOINTMENT CANCELED BY HOSPITAL: Primary | ICD-10-CM

## 2018-08-20 DIAGNOSIS — I25.10 CORONARY ARTERY DISEASE INVOLVING NATIVE CORONARY ARTERY OF NATIVE HEART WITHOUT ANGINA PECTORIS: ICD-10-CM

## 2018-08-20 DIAGNOSIS — R00.1 BRADYCARDIA: ICD-10-CM

## 2018-08-20 DIAGNOSIS — E78.2 MIXED HYPERLIPIDEMIA: ICD-10-CM

## 2018-08-20 DIAGNOSIS — I25.10 ATHEROSCLEROTIC HEART DISEASE OF NATIVE CORONARY ARTERY WITHOUT ANGINA PECTORIS: ICD-10-CM

## 2018-08-20 DIAGNOSIS — I73.9 PVD (PERIPHERAL VASCULAR DISEASE) (HCC): Primary | ICD-10-CM

## 2018-08-20 DIAGNOSIS — I10 ESSENTIAL HYPERTENSION: ICD-10-CM

## 2018-08-20 LAB
LV EF: 64 %
LVEF MODALITY: NORMAL

## 2018-08-20 PROCEDURE — 99214 OFFICE O/P EST MOD 30 MIN: CPT | Performed by: INTERNAL MEDICINE

## 2018-08-20 RX ORDER — AMLODIPINE BESYLATE 10 MG/1
10 TABLET ORAL DAILY
Qty: 90 TABLET | Refills: 3 | Status: SHIPPED | OUTPATIENT
Start: 2018-08-20 | End: 2019-05-21 | Stop reason: SDUPTHER

## 2018-08-20 RX ORDER — OLMESARTAN MEDOXOMIL 20 MG/1
20 TABLET ORAL
Status: ON HOLD | COMMUNITY
End: 2018-10-04 | Stop reason: SDUPTHER

## 2018-08-20 RX ORDER — CARVEDILOL 6.25 MG/1
6.25 TABLET ORAL 2 TIMES DAILY
COMMUNITY
Start: 2018-06-18 | End: 2018-10-12 | Stop reason: SDUPTHER

## 2018-08-20 NOTE — PATIENT INSTRUCTIONS
Continue taking home medications  Continue to work on lowering your A1C/controlling blood sugar  Increase ambulation to promote good blood flow  Call if your legs start to cramp/you aren't able to walk normally  Follow up after pacemaker placement (2 months)

## 2018-08-20 NOTE — LETTER
alprazolam (XANAX) 0.25 MG tablet Take 0.25 mg by mouth 3 times daily as needed. TAKES 1/2 TAB DAILY OR TWO TIMES A DAY AS NEEDED 3/29/10  Yes Historical Provider, MD   fish oil-omega-3 fatty acids 1000 MG capsule Take 1 g by mouth 2 times daily. ON HOLD 1 WEEK PRE-OP 3/29/10  Yes Historical Provider, MD       Allergies:  Penicillins     Review of Systems:   [x]Full ROS obtained and negative except as mentioned in HPI    Physical Examination:    /70 (Site: Left Arm, Position: Sitting, Cuff Size: Medium Adult)   Pulse 57   Ht 5' 10\" (1.778 m)   Wt 255 lb 11.2 oz (116 kg)   SpO2 97%   BMI 36.69 kg/m²    Wt Readings from Last 3 Encounters:   08/20/18 255 lb 11.2 oz (116 kg)   08/02/18 253 lb 3.2 oz (114.9 kg)   07/17/18 252 lb (114.3 kg)       GENERAL: Well developed, well nourished, no acute distress  NEUROLOGICAL: Alert and oriented x3  PSYCH: Normal mood and affect   SKIN: Warm and dry, without lesions  HEENT: Normocephalic, atraumatic, Sclera non-icteric, mucous membranes moist  NECK: supple, JVP normal, thyroid not enlarged   CAROTID: Normal upstroke, no bruits  CARDIAC: Normal PMI, [place], regular rhythm, normal S1S2, no murmur, rub  RESPIRATORY: Normal respiratory effort, clear to auscultation bilaterally  EXTREMITIES: No cyanosis, clubbing;  both feet examined and without skin breakdown or edema. Palpable pedal pulses   MUSCULOSKELETAL: No joint swelling or tenderness, no chest wall tenderness  GASTROINTESTINAL:  soft, non-tender, no bruit    Labs:  Lab Review   No visits with results within 2 Month(s) from this visit.    Latest known visit with results is:   Hospital Outpatient Visit on 06/01/2018   Component Date Value    Sodium 06/01/2018 139     Potassium 06/01/2018 4.8     Chloride 06/01/2018 101     CO2 06/01/2018 27     Anion Gap 06/01/2018 11     Glucose 06/01/2018 71     BUN 06/01/2018 24*    CREATININE 06/01/2018 1.4*    GFR Non- 06/01/2018 48*

## 2018-08-20 NOTE — PROGRESS NOTES
18    PATIENT: Jesus Albetro Celis. : 1935    Primary Care Provider:   Mor Kerr MD  Z:[phone]  C:[phone]      Reason for evaluation:   Leg fatigue    History of present illness:   Mr. Jesus Alberto Celis. is an 80 y.o. male patient of Dr. Paul Austin I am seeing regarding bilateral leg fatigue. He continues to deny any pain and offers no descriptions in line with classic claudication, myalgias, arthralgias or neuropathy. However, he does describe occasional paresthesias at left great toe. He has no history of lumbar spine or sciatica issues and denies any pain radiating from his back. He's had no formal history of venous insufficiency but does have edema with restarting the amlodipine. He and his wife admit that he did not start the walking trial since last visit. He felt busy with appointments, having scheduled for permanent pacemaker with Dr. Clark Ferreira and updating stress test for Dr. Cristina Cordon today. Medical History:      Diagnosis Date    Anxiety     Arthritis     CAD (coronary artery disease)     MI X 2 ,     [facility]     BASAL CELL    GERD (gastroesophageal reflux disease)     Hyperlipidemia     Hypertension        Surgical History:      Procedure Laterality Date    APPENDECTOMY      CARDIAC SURGERY      3 V    CARPAL TUNNEL RELEASE      BILAT    CORONARY ANGIOPLASTY WITH STENT PLACEMENT          KNEE ARTHROSCOPY      RT    MOHS SURGERY      on his face    PROSTATE SURGERY      TONSILLECTOMY      TURP      TURP  2010    CYSTO INTERNAL URETHROTOMY       Social History:  Social History     Social History    Marital status:      Spouse name: N/A    Number of children: N/A    Years of education: N/A     Occupational History    Not on file.      Social History Main Topics    Smoking status: Former Smoker     Packs/day: 1.50     Years: 30.00     Quit date: 3/29/1992   

## 2018-08-22 NOTE — COMMUNICATION BODY
18    PATIENT: Yasmany Shaw. : 1935    Primary Care Provider:   Romi Arana MD  D:[phone]  Q:[phone]      Reason for evaluation:   Leg fatigue    History of present illness:   Mr. Yasmany Shaw. is an 80 y.o. male patient of Dr. Lolis Butterfield I am seeing regarding bilateral leg fatigue. He continues to deny any pain and offers no descriptions in line with classic claudication, myalgias, arthralgias or neuropathy. However, he does describe occasional paresthesias at left great toe. He has no history of lumbar spine or sciatica issues and denies any pain radiating from his back. He's had no formal history of venous insufficiency but does have edema with restarting the amlodipine. He and his wife admit that he did not start the walking trial since last visit. He felt busy with appointments, having scheduled for permanent pacemaker with Dr. Reynolds Memos and updating stress test for Dr. Blain Bence today. Medical History:      Diagnosis Date    Anxiety     Arthritis     CAD (coronary artery disease)     MI X 2 ,     [facility]     BASAL CELL    GERD (gastroesophageal reflux disease)     Hyperlipidemia     Hypertension        Surgical History:      Procedure Laterality Date    APPENDECTOMY      CARDIAC SURGERY      3 V    CARPAL TUNNEL RELEASE      BILAT    CORONARY ANGIOPLASTY WITH STENT PLACEMENT          KNEE ARTHROSCOPY      RT    MOHS SURGERY      on his face    PROSTATE SURGERY      TONSILLECTOMY      TURP      TURP  2010    CYSTO INTERNAL URETHROTOMY       Social History:  Social History     Social History    Marital status:      Spouse name: N/A    Number of children: N/A    Years of education: N/A     Occupational History    Not on file.      Social History Main Topics    Smoking status: Former Smoker     Packs/day: 1.50     Years: 30.00     Quit date: 3/29/1992    Smokeless tobacco: Never Used      Comment: quit in 1991    Alcohol use Yes      Comment: RARE    Drug use: No    Sexual activity: Not on file     Other Topics Concern    Not on file     Social History Narrative    No narrative on file        Family History:  No evidence for sudden cardiac death or premature CAD. Family History   Problem Relation Age of Onset    Heart Disease Mother     Diabetes Father     Cancer Father        Medications:  [x] Medications and dosages reviewed. Prior to Admission medications    Medication Sig Start Date End Date Taking? Authorizing Provider   olmesartan (BENICAR) 20 MG tablet Take 20 mg by mouth   Yes Historical Provider, MD   carvedilol (COREG) 6.25 MG tablet Take 6.25 mg by mouth 2 times daily  6/18/18  Yes Historical Provider, MD   amLODIPine (NORVASC) 10 MG tablet Take 1 tablet by mouth daily 8/20/18  Yes Jaci Hanna DO   furosemide (LASIX) 20 MG tablet TAKE 2 TABLETS BY MOUTH DAILY  Patient taking differently: Take 20 mg by mouth 2 times daily  8/14/18  Yes Wally Huerta MD   aspirin 81 MG tablet Take 81 mg by mouth daily   Yes Historical Provider, MD   olmesartan (BENICAR) 20 MG tablet Take 1 tablet by mouth daily 8/2/18  Yes Maria Elena Pettit MD   ranitidine (ZANTAC) 300 MG tablet Take 300 mg by mouth nightly   Yes Historical Provider, MD   glimepiride (AMARYL) 2 MG tablet Take 2 mg by mouth every morning (before breakfast)   Yes Historical Provider, MD   pravastatin (PRAVACHOL) 20 MG tablet Take 1 tablet by mouth daily 2/15/18  Yes Wally Huerta MD   alprazolam Cynthia Chucho) 0.25 MG tablet Take 0.25 mg by mouth 3 times daily as needed. TAKES 1/2 TAB DAILY OR TWO TIMES A DAY AS NEEDED 3/29/10  Yes Historical Provider, MD   fish oil-omega-3 fatty acids 1000 MG capsule Take 1 g by mouth 2 times daily.  ON HOLD 1 WEEK PRE-OP 3/29/10  Yes Historical Provider, MD       Allergies:  Penicillins     Review of Systems:   [x]Full ROS obtained and negative except as mentioned in HPI    Physical Examination:    /70 (Site: Left Arm, Position: Sitting, Cuff Size: Medium Adult)   Pulse 57   Ht 5' 10\" (1.778 m)   Wt 255 lb 11.2 oz (116 kg)   SpO2 97%   BMI 36.69 kg/m²    Wt Readings from Last 3 Encounters:   08/20/18 255 lb 11.2 oz (116 kg)   08/02/18 253 lb 3.2 oz (114.9 kg)   07/17/18 252 lb (114.3 kg)       GENERAL: Well developed, well nourished, no acute distress  NEUROLOGICAL: Alert and oriented x3  PSYCH: Normal mood and affect   SKIN: Warm and dry, without lesions  HEENT: Normocephalic, atraumatic, Sclera non-icteric, mucous membranes moist  NECK: supple, JVP normal, thyroid not enlarged   CAROTID: Normal upstroke, no bruits  CARDIAC: Normal PMI, monroe, regular rhythm, normal S1S2, no murmur, rub  RESPIRATORY: Normal respiratory effort, clear to auscultation bilaterally  EXTREMITIES: No cyanosis, clubbing;  both feet examined and without skin breakdown or edema. Palpable pedal pulses   MUSCULOSKELETAL: No joint swelling or tenderness, no chest wall tenderness  GASTROINTESTINAL:  soft, non-tender, no bruit    Labs:  Lab Review   No visits with results within 2 Month(s) from this visit.    Latest known visit with results is:   Hospital Outpatient Visit on 06/01/2018   Component Date Value    Sodium 06/01/2018 139     Potassium 06/01/2018 4.8     Chloride 06/01/2018 101     CO2 06/01/2018 27     Anion Gap 06/01/2018 11     Glucose 06/01/2018 71     BUN 06/01/2018 24*    CREATININE 06/01/2018 1.4*    GFR Non- 06/01/2018 48*    GFR  06/01/2018 59*    Calcium 06/01/2018 9.3     Phosphorus 06/01/2018 3.5     Alb 06/01/2018 4.2          Imaging:  I have reviewed the below testing personally:    VASCULAR:   5/16/18    Greater than 50% stenosis in left distal SFA  Diffuse plaque right SFA, focal plaque in right common femoral and popliteal  Diffuse plaque Left SFA, focal plaque of left common femoral and profunda 5/3/17  There is no evidence of deep or superficial venous thrombus in the bilateral lower extremities. Impression/Recommendations    Mr. Sanya Moyer is a 80 y.o. male patient with leg fatigue:    PVD   -Resting SALENA readings with Duplex suggest diffuse, noncritical bilateral femoropopliteal disease    -CTA Abdominal with Bilateral LE Runoff:mild to moderate bilateral lower extremity PAD, with concern for significant lesion at the left SFA greater than right. He and his wife would like to trial increased exercise as first line approach at home given noncritical findings. This is reasonable at this time and we reviewed signs/symptoms to call in for if a change. -Quit tobacco many years ago    -Antiplatelet therapy-  ASA 81 mg daily to date     -Statin therapy: Continue Pravastatin 20 mg daily     Symptomatic bradycardia - PPM scheduled October 4th with Dr. Isabell BAJWA under the care of Dr. Faisal Cadena - GXT Tarun today  Preserved LVEF 2015   CKD  HTN  HLD  SAUD        Patient instructions:  Continue taking home medications  Continue to work on lowering your A1C/controlling blood sugar  Increase ambulation to promote good blood flow  Call if your legs start to cramp/you aren't able to walk normally  Follow up after pacemaker placement (2 months)      Thank you for allowing me to participate in the care of your patient. Please do not hesitate to call.      Sergio Bailon D.O., [facility]  Interventional Cardiology     o: [phone]  [facility] [address], [address] [facility]

## 2018-08-30 RX ORDER — OLMESARTAN MEDOXOMIL 20 MG/1
TABLET ORAL
Qty: 30 TABLET | Refills: 11 | Status: ON HOLD | OUTPATIENT
Start: 2018-08-30 | End: 2018-10-04 | Stop reason: SDUPTHER

## 2018-10-04 ENCOUNTER — HOSPITAL ENCOUNTER (OUTPATIENT)
Dept: CARDIAC CATH/INVASIVE PROCEDURES | Age: 83
Discharge: HOME HEALTH | End: 2018-10-05
Attending: INTERNAL MEDICINE | Admitting: INTERNAL MEDICINE
Payer: MEDICARE

## 2018-10-04 LAB
GFR AFRICAN AMERICAN: >60
GFR NON-AFRICAN AMERICAN: 53
GLUCOSE BLD-MCNC: 171 MG/DL (ref 70–99)
HEMOGLOBIN: 13 G/DL (ref 13.5–17.5)
PERFORMED ON: ABNORMAL
PLATELET # BLD: 154 K/UL (ref 135–450)
POC BUN: 23 MG/DL (ref 7–18)
POC CREATININE: 1.3 MG/DL (ref 0.8–1.3)
POC HEMATOCRIT: 36 % (ref 41–53)
POC POTASSIUM: 4 MMOL/L (ref 3.5–5.1)
POC SAMPLE TYPE: ABNORMAL
POC SODIUM: 142 MMOL/L (ref 136–145)
WBC # BLD: 5 K/UL (ref 4–11)

## 2018-10-04 PROCEDURE — 85018 HEMOGLOBIN: CPT

## 2018-10-04 PROCEDURE — 6360000002 HC RX W HCPCS

## 2018-10-04 PROCEDURE — 2580000003 HC RX 258: Performed by: INTERNAL MEDICINE

## 2018-10-04 PROCEDURE — 84132 ASSAY OF SERUM POTASSIUM: CPT

## 2018-10-04 PROCEDURE — 93005 ELECTROCARDIOGRAM TRACING: CPT | Performed by: INTERNAL MEDICINE

## 2018-10-04 PROCEDURE — 33208 INSRT HEART PM ATRIAL & VENT: CPT | Performed by: INTERNAL MEDICINE

## 2018-10-04 PROCEDURE — C1894 INTRO/SHEATH, NON-LASER: HCPCS

## 2018-10-04 PROCEDURE — 2580000003 HC RX 258

## 2018-10-04 PROCEDURE — 99153 MOD SED SAME PHYS/QHP EA: CPT | Performed by: INTERNAL MEDICINE

## 2018-10-04 PROCEDURE — 36415 COLL VENOUS BLD VENIPUNCTURE: CPT

## 2018-10-04 PROCEDURE — 6370000000 HC RX 637 (ALT 250 FOR IP): Performed by: INTERNAL MEDICINE

## 2018-10-04 PROCEDURE — C1785 PMKR, DUAL, RATE-RESP: HCPCS

## 2018-10-04 PROCEDURE — 99152 MOD SED SAME PHYS/QHP 5/>YRS: CPT | Performed by: INTERNAL MEDICINE

## 2018-10-04 PROCEDURE — 84520 ASSAY OF UREA NITROGEN: CPT

## 2018-10-04 PROCEDURE — 94760 N-INVAS EAR/PLS OXIMETRY 1: CPT

## 2018-10-04 PROCEDURE — 84295 ASSAY OF SERUM SODIUM: CPT

## 2018-10-04 PROCEDURE — C1898 LEAD, PMKR, OTHER THAN TRANS: HCPCS

## 2018-10-04 PROCEDURE — 82565 ASSAY OF CREATININE: CPT

## 2018-10-04 PROCEDURE — 93010 ELECTROCARDIOGRAM REPORT: CPT | Performed by: INTERNAL MEDICINE

## 2018-10-04 PROCEDURE — 85049 AUTOMATED PLATELET COUNT: CPT

## 2018-10-04 PROCEDURE — 85014 HEMATOCRIT: CPT

## 2018-10-04 PROCEDURE — 82947 ASSAY GLUCOSE BLOOD QUANT: CPT

## 2018-10-04 PROCEDURE — 85048 AUTOMATED LEUKOCYTE COUNT: CPT

## 2018-10-04 PROCEDURE — 2500000003 HC RX 250 WO HCPCS

## 2018-10-04 PROCEDURE — 6360000002 HC RX W HCPCS: Performed by: INTERNAL MEDICINE

## 2018-10-04 RX ORDER — SODIUM CHLORIDE 0.9 % (FLUSH) 0.9 %
10 SYRINGE (ML) INJECTION PRN
Status: DISCONTINUED | OUTPATIENT
Start: 2018-10-04 | End: 2018-10-05 | Stop reason: HOSPADM

## 2018-10-04 RX ORDER — GLIMEPIRIDE 2 MG/1
2 TABLET ORAL
Status: DISCONTINUED | OUTPATIENT
Start: 2018-10-05 | End: 2018-10-05 | Stop reason: HOSPADM

## 2018-10-04 RX ORDER — CARVEDILOL 6.25 MG/1
6.25 TABLET ORAL 2 TIMES DAILY WITH MEALS
Status: DISCONTINUED | OUTPATIENT
Start: 2018-10-04 | End: 2018-10-05 | Stop reason: HOSPADM

## 2018-10-04 RX ORDER — CEFAZOLIN SODIUM 2 G/100ML
2 INJECTION, SOLUTION INTRAVENOUS EVERY 8 HOURS
Status: COMPLETED | OUTPATIENT
Start: 2018-10-04 | End: 2018-10-05

## 2018-10-04 RX ORDER — AMLODIPINE BESYLATE 5 MG/1
10 TABLET ORAL NIGHTLY
Status: DISCONTINUED | OUTPATIENT
Start: 2018-10-04 | End: 2018-10-05 | Stop reason: HOSPADM

## 2018-10-04 RX ORDER — ASPIRIN 81 MG/1
81 TABLET ORAL NIGHTLY
Status: DISCONTINUED | OUTPATIENT
Start: 2018-10-04 | End: 2018-10-05 | Stop reason: HOSPADM

## 2018-10-04 RX ORDER — TRAMADOL HYDROCHLORIDE 50 MG/1
50 TABLET ORAL EVERY 6 HOURS PRN
Status: DISCONTINUED | OUTPATIENT
Start: 2018-10-04 | End: 2018-10-05 | Stop reason: HOSPADM

## 2018-10-04 RX ORDER — LOSARTAN POTASSIUM 25 MG/1
12.5 TABLET ORAL DAILY
Status: DISCONTINUED | OUTPATIENT
Start: 2018-10-05 | End: 2018-10-05 | Stop reason: HOSPADM

## 2018-10-04 RX ORDER — OMEGA-3/DHA/EPA/FISH OIL 300-1000MG
1 CAPSULE ORAL 2 TIMES DAILY
Status: DISCONTINUED | OUTPATIENT
Start: 2018-10-04 | End: 2018-10-04 | Stop reason: RX

## 2018-10-04 RX ORDER — ONDANSETRON 2 MG/ML
4 INJECTION INTRAMUSCULAR; INTRAVENOUS EVERY 6 HOURS PRN
Status: DISCONTINUED | OUTPATIENT
Start: 2018-10-04 | End: 2018-10-05 | Stop reason: HOSPADM

## 2018-10-04 RX ORDER — ALPRAZOLAM 0.25 MG/1
0.25 TABLET ORAL 3 TIMES DAILY PRN
Status: DISCONTINUED | OUTPATIENT
Start: 2018-10-04 | End: 2018-10-05 | Stop reason: HOSPADM

## 2018-10-04 RX ORDER — FAMOTIDINE 20 MG/1
20 TABLET, FILM COATED ORAL DAILY
Status: DISCONTINUED | OUTPATIENT
Start: 2018-10-05 | End: 2018-10-05 | Stop reason: HOSPADM

## 2018-10-04 RX ORDER — ACETAMINOPHEN 325 MG/1
650 TABLET ORAL EVERY 4 HOURS PRN
Status: DISCONTINUED | OUTPATIENT
Start: 2018-10-04 | End: 2018-10-05 | Stop reason: HOSPADM

## 2018-10-04 RX ORDER — PRAVASTATIN SODIUM 20 MG
20 TABLET ORAL NIGHTLY
Status: DISCONTINUED | OUTPATIENT
Start: 2018-10-04 | End: 2018-10-05 | Stop reason: HOSPADM

## 2018-10-04 RX ORDER — SODIUM CHLORIDE 0.9 % (FLUSH) 0.9 %
10 SYRINGE (ML) INJECTION EVERY 12 HOURS SCHEDULED
Status: DISCONTINUED | OUTPATIENT
Start: 2018-10-04 | End: 2018-10-05 | Stop reason: HOSPADM

## 2018-10-04 RX ORDER — FUROSEMIDE 40 MG/1
40 TABLET ORAL DAILY
Status: DISCONTINUED | OUTPATIENT
Start: 2018-10-05 | End: 2018-10-05 | Stop reason: HOSPADM

## 2018-10-04 RX ADMIN — CARVEDILOL 6.25 MG: 6.25 TABLET, FILM COATED ORAL at 16:52

## 2018-10-04 RX ADMIN — AMLODIPINE BESYLATE 10 MG: 5 TABLET ORAL at 21:21

## 2018-10-04 RX ADMIN — PRAVASTATIN SODIUM 20 MG: 20 TABLET ORAL at 21:21

## 2018-10-04 RX ADMIN — CEFAZOLIN SODIUM 2 G: 2 INJECTION, SOLUTION INTRAVENOUS at 15:05

## 2018-10-04 RX ADMIN — TRAMADOL HYDROCHLORIDE 50 MG: 50 TABLET, FILM COATED ORAL at 16:52

## 2018-10-04 RX ADMIN — ALPRAZOLAM 0.25 MG: 0.25 TABLET ORAL at 15:05

## 2018-10-04 RX ADMIN — ASPIRIN 81 MG: 81 TABLET, COATED ORAL at 21:21

## 2018-10-04 RX ADMIN — SODIUM CHLORIDE, PRESERVATIVE FREE 10 ML: 5 INJECTION INTRAVENOUS at 21:21

## 2018-10-04 RX ADMIN — ACETAMINOPHEN 650 MG: 325 TABLET, FILM COATED ORAL at 15:05

## 2018-10-04 ASSESSMENT — PAIN SCALES - GENERAL
PAINLEVEL_OUTOF10: 5
PAINLEVEL_OUTOF10: 4

## 2018-10-04 NOTE — PRE SEDATION
Sedation Pre-Procedure Note    Patient Name: Ruddy Bowie. YOB: 1935  Room/Bed: Cath/NONE  Medical Record Number: [id number]  Date: 10/4/2018   Time: 8:38 AM       Indication:  Pacemaker implantation     Consent: I have discussed with the patient and/or the patient representative the indication, alternatives, and the possible risks and/or complications of the planned procedure and the anesthesia methods. The patient and/or patient representative appear to understand and agree to proceed. Vital Signs: There were no vitals filed for this visit. Past Medical History:   has a past medical history of Anxiety; Arthritis; CAD (coronary artery disease); Cancer [facility]); GERD (gastroesophageal reflux disease); Hyperlipidemia; and Hypertension. Past Surgical History:   has a past surgical history that includes Appendectomy; Tonsillectomy; Knee arthroscopy; TURP; Coronary angioplasty with stent; Carpal tunnel release; TURP (12-); Cardiac surgery (2007); Prostate surgery; and Mohs surgery. Medications:   Scheduled Meds:   Continuous Infusions:   PRN Meds:   Home Meds:   Prior to Admission medications    Medication Sig Start Date End Date Taking?  Authorizing Provider   carvedilol (COREG) 6.25 MG tablet Take 6.25 mg by mouth 2 times daily  6/18/18  Yes Historical Provider, MD   amLODIPine (NORVASC) 10 MG tablet Take 1 tablet by mouth daily  Patient taking differently: Take 10 mg by mouth nightly  8/20/18  Yes Duncan Abdul DO   furosemide (LASIX) 20 MG tablet TAKE 2 TABLETS BY MOUTH DAILY  Patient taking differently: Take 20 mg by mouth 2 times daily  8/14/18  Yes Deepali Jaime MD   aspirin 81 MG tablet Take 81 mg by mouth nightly    Yes Historical Provider, MD   olmesartan (BENICAR) 20 MG tablet Take 1 tablet by mouth daily 8/2/18  Yes Elvira Bender MD   ranitidine (ZANTAC) 300 MG tablet Take 300 mg by mouth daily    Yes Historical Provider, MD   glimepiride (AMARYL) 2 MG

## 2018-10-04 NOTE — PROCEDURES
[address]     Electrophysiology Procedure Note       Date of Procedure: 10/4/2018  Patient's Name: Ruddy Bowie. YOB: 1935   Medical Record Number: [id number]  Procedure Performed by: Ralph Lambert MD    Procedures performed:  · Insertion of MRI compatible right ventricular pacing lead under fluoroscopy. · Insertion of MRI compatible right atrial lead under fluoroscopy  · Insertion of a MRI compatible dual chamber Pacemaker  · IV sedation. · Programming and analysis of dual chamber pacemaker. Indication of the procedure: Non-reversible symptomatic bradycardia, Tachy-monroe syndrome   Ruddy Dillon is a 80 y.o. male symptomatic Bradycardia and tachy-monroe syndrome. Details of procedure: The patient was brought to the electrophysiology laboratory in stable condition. The patient was in a fasting and non-sedated state. The risks, benefits and alternatives of the procedure were discussed with the patient. The risks including, but not limited to, the risks of vascular injury, bleeding, infection, device malfunction, lead dislodgement, radiation exposure, injury to cardiac and surrounding structures (including pneumothorax), stroke, myocardial infarction and death were discussed in detail. Patient opted to proceed with the device implantation. Written informed consent was signed and placed in the chart. Prophylactic antibiotic was given. The patient was prepped and draped in a sterile fashion. A timeout protocol was completed to identify the patient and the procedure being performed. Pre-sedation evaluation and airway assessment (Mallampatti classification, class I) was completed. IV sedation was provided with IV Versed, Fentanyl. An incision was made in the left pectoral area after administration of lidocaine. Using electrocautery and blunt dissection, a pocket was created.  Central venous access into the left axillary vein was obtained using the modified

## 2018-10-05 ENCOUNTER — APPOINTMENT (OUTPATIENT)
Dept: GENERAL RADIOLOGY | Age: 83
End: 2018-10-05
Attending: INTERNAL MEDICINE
Payer: MEDICARE

## 2018-10-05 VITALS
DIASTOLIC BLOOD PRESSURE: 70 MMHG | OXYGEN SATURATION: 97 % | HEIGHT: 70 IN | HEART RATE: 63 BPM | TEMPERATURE: 97.6 F | BODY MASS INDEX: 36.01 KG/M2 | RESPIRATION RATE: 18 BRPM | SYSTOLIC BLOOD PRESSURE: 136 MMHG | WEIGHT: 251.5 LBS

## 2018-10-05 LAB
ANION GAP SERPL CALCULATED.3IONS-SCNC: 11 MMOL/L (ref 3–16)
BUN BLDV-MCNC: 20 MG/DL (ref 7–20)
CALCIUM SERPL-MCNC: 9 MG/DL (ref 8.3–10.6)
CHLORIDE BLD-SCNC: 103 MMOL/L (ref 99–110)
CO2: 24 MMOL/L (ref 21–32)
CREAT SERPL-MCNC: 1.3 MG/DL (ref 0.8–1.3)
GFR AFRICAN AMERICAN: >60
GFR NON-AFRICAN AMERICAN: 53
GLUCOSE BLD-MCNC: 136 MG/DL (ref 70–99)
GLUCOSE BLD-MCNC: 156 MG/DL (ref 70–99)
GLUCOSE BLD-MCNC: 174 MG/DL (ref 70–99)
HCT VFR BLD CALC: 35.9 % (ref 40.5–52.5)
HEMOGLOBIN: 12 G/DL (ref 13.5–17.5)
MCH RBC QN AUTO: 31.1 PG (ref 26–34)
MCHC RBC AUTO-ENTMCNC: 33.6 G/DL (ref 31–36)
MCV RBC AUTO: 92.7 FL (ref 80–100)
PDW BLD-RTO: 15.9 % (ref 12.4–15.4)
PERFORMED ON: ABNORMAL
PERFORMED ON: ABNORMAL
PLATELET # BLD: 132 K/UL (ref 135–450)
PMV BLD AUTO: 8.6 FL (ref 5–10.5)
POTASSIUM REFLEX MAGNESIUM: 3.9 MMOL/L (ref 3.5–5.1)
RBC # BLD: 3.87 M/UL (ref 4.2–5.9)
SODIUM BLD-SCNC: 138 MMOL/L (ref 136–145)
WBC # BLD: 6.4 K/UL (ref 4–11)

## 2018-10-05 PROCEDURE — 2580000003 HC RX 258: Performed by: INTERNAL MEDICINE

## 2018-10-05 PROCEDURE — 85027 COMPLETE CBC AUTOMATED: CPT

## 2018-10-05 PROCEDURE — 99024 POSTOP FOLLOW-UP VISIT: CPT | Performed by: NURSE PRACTITIONER

## 2018-10-05 PROCEDURE — 6370000000 HC RX 637 (ALT 250 FOR IP): Performed by: INTERNAL MEDICINE

## 2018-10-05 PROCEDURE — 93280 PM DEVICE PROGR EVAL DUAL: CPT | Performed by: INTERNAL MEDICINE

## 2018-10-05 PROCEDURE — 36415 COLL VENOUS BLD VENIPUNCTURE: CPT

## 2018-10-05 PROCEDURE — 6360000002 HC RX W HCPCS: Performed by: INTERNAL MEDICINE

## 2018-10-05 PROCEDURE — 80048 BASIC METABOLIC PNL TOTAL CA: CPT

## 2018-10-05 PROCEDURE — 71046 X-RAY EXAM CHEST 2 VIEWS: CPT

## 2018-10-05 RX ADMIN — SODIUM CHLORIDE, PRESERVATIVE FREE 10 ML: 5 INJECTION INTRAVENOUS at 08:41

## 2018-10-05 RX ADMIN — FUROSEMIDE 40 MG: 40 TABLET ORAL at 08:40

## 2018-10-05 RX ADMIN — FAMOTIDINE 20 MG: 20 TABLET, FILM COATED ORAL at 08:40

## 2018-10-05 RX ADMIN — CEFAZOLIN SODIUM 2 G: 2 INJECTION, SOLUTION INTRAVENOUS at 00:32

## 2018-10-05 RX ADMIN — LOSARTAN POTASSIUM 12.5 MG: 25 TABLET ORAL at 08:40

## 2018-10-05 RX ADMIN — CARVEDILOL 6.25 MG: 6.25 TABLET, FILM COATED ORAL at 08:40

## 2018-10-05 RX ADMIN — GLIMEPIRIDE 2 MG: 2 TABLET ORAL at 08:40

## 2018-10-05 NOTE — DISCHARGE SUMMARY
[address]    DISCHARGE SUMMARY      Patient ID:  Annalisa Rivera  [id number] 36 y.o. 1935    Admit date: 10/4/2018    Discharge date:  10/5/18    Admitting Physician: Melody Jiang MD     Discharge Physician: Ghazal Howard     Admission Diagnoses: Symptomatic bradycardia [R00.1]    Discharge Diagnoses:   Patient Active Problem List   Diagnosis    BPH (benign prostatic hyperplasia)    CAD (coronary artery disease)    HTN (hypertension)    OA (osteoarthritis)    Pre-operative clearance    Edema    Hyperlipidemia    Claudication of both lower extremities ([facility]    Sleep apnea    Symptomatic bradycardia    PVD (peripheral vascular disease) ([facility]    Tachy-monroe syndrome ([facility]        Discharged Condition: good    Hospital Course: Annalisa Viramontes was admitted for scheduled dual chamber PPM implant    Consults: none    Significant Diagnostic Studies:   Echo: 5/4/15  LVEF 55-60%     Stress: 3/2015  ECG interpretation: The baseline ECG is normal sinus rhythm. The  stress ECG is sinus tachycardia. The patient develops a rate related  right bundle branch block during exercise. No ischemic changes seen. No arrhythmias seen. The recovery ECG is sinus rhythm. The right  bundle branch block resolves when the heart rate drops below 100  beats per minute. The patient had been in a few isolated PVCs early  in recovery which resolved spontaneously. Impression  ECG negative exercise stress test for ischemia. Symptom negative  stress test.  Appropriate hemodynamic response to stress with respect  to heart rate. Resting hypertension with hypertensive response to  exercise. Good functional capacity for age.   Ordoñez a treadmill score  of +6 indicating low risk stress test.    Labs:   Lab Results   Component Value Date    CREATININE 1.3 10/05/2018    BUN 20 10/05/2018     10/05/2018    K 3.9 10/05/2018     10/05/2018    CO2 24 10/05/2018      Lab Results   Component

## 2018-10-08 LAB
EKG ATRIAL RATE: 52 BPM
EKG DIAGNOSIS: NORMAL
EKG P AXIS: 30 DEGREES
EKG P-R INTERVAL: 194 MS
EKG Q-T INTERVAL: 444 MS
EKG QRS DURATION: 102 MS
EKG QTC CALCULATION (BAZETT): 412 MS
EKG R AXIS: 45 DEGREES
EKG T AXIS: 56 DEGREES
EKG VENTRICULAR RATE: 52 BPM

## 2018-10-10 ENCOUNTER — PROCEDURE VISIT (OUTPATIENT)
Dept: CARDIOLOGY CLINIC | Age: 83
End: 2018-10-10
Payer: MEDICARE

## 2018-10-10 DIAGNOSIS — Z95.0 PACEMAKER: Primary | ICD-10-CM

## 2018-10-10 DIAGNOSIS — I49.5 TACHY-BRADY SYNDROME (HCC): ICD-10-CM

## 2018-10-10 PROCEDURE — 93280 PM DEVICE PROGR EVAL DUAL: CPT | Performed by: INTERNAL MEDICINE

## 2018-10-10 NOTE — PROGRESS NOTES
([id number]) on 10/4/2018 11:23:04 PM     WBC 10/04/2018 5.0     Platelets 89/96/8162 154     Hemoglobin 10/04/2018 13.0*    POC Sodium 10/04/2018 142     POC Potassium 10/04/2018 4.0     POC Glucose 10/04/2018 171*    POC BUN 10/04/2018 23*    POC Creatinine 10/04/2018 1.3     GFR Non- 10/04/2018 53*    GFR  10/04/2018 >60     POC Hematocrit 10/04/2018 36*    Sample Type 10/04/2018 GLYNN     Performed on 10/04/2018 SEE BELOW     Sodium 10/05/2018 138     Potassium reflex Magnesi* 10/05/2018 3.9     Chloride 10/05/2018 103     CO2 10/05/2018 24     Anion Gap 10/05/2018 11     Glucose 10/05/2018 136*    BUN 10/05/2018 20     CREATININE 10/05/2018 1.3     GFR Non- 10/05/2018 53*    GFR  10/05/2018 >60     Calcium 10/05/2018 9.0     WBC 10/05/2018 6.4     RBC 10/05/2018 3.87*    Hemoglobin 10/05/2018 12.0*    Hematocrit 10/05/2018 35.9*    MCV 10/05/2018 92.7     MCH 10/05/2018 31.1     MCHC 10/05/2018 33.6     RDW 10/05/2018 15.9*    Platelets 27/15/9942 132*    MPV 10/05/2018 8.6     POC Glucose 10/05/2018 156*    Performed on 10/05/2018 ACCU-CHEK     POC Glucose 10/05/2018 174*    Performed on 10/05/2018 ACCU-CHEK          Imaging:  I have reviewed the below testing personally:    Stress Test 8/20/2018  There is a small inferior fixed defect of moderate intensity.    This could be a small area of scar vs diaphragm artifact.    There is no ischemia.    LV function is normal with EF=64%       VASCULAR:   5/16/18  Greater than 50% stenosis in left distal SFA  Diffuse plaque right SFA, focal plaque in right common femoral and popliteal  Diffuse plaque Left SFA, focal plaque of left common femoral and profunda     5/3/17  There is no evidence of deep or superficial venous thrombus in the bilateral lower extremities.     6/1/18 CTA Abdominal aorta with bilateral lower extremity runoff  VASCULAR       There is a moderate amount of

## 2018-10-12 ENCOUNTER — OFFICE VISIT (OUTPATIENT)
Dept: CARDIOLOGY CLINIC | Age: 83
End: 2018-10-12

## 2018-10-12 VITALS
HEIGHT: 70 IN | SYSTOLIC BLOOD PRESSURE: 138 MMHG | DIASTOLIC BLOOD PRESSURE: 76 MMHG | HEART RATE: 62 BPM | BODY MASS INDEX: 36.51 KG/M2 | WEIGHT: 255 LBS

## 2018-10-12 DIAGNOSIS — I25.10 CORONARY ARTERY DISEASE INVOLVING NATIVE CORONARY ARTERY OF NATIVE HEART WITHOUT ANGINA PECTORIS: ICD-10-CM

## 2018-10-12 DIAGNOSIS — E78.2 MIXED HYPERLIPIDEMIA: ICD-10-CM

## 2018-10-12 DIAGNOSIS — I10 ESSENTIAL HYPERTENSION: ICD-10-CM

## 2018-10-12 DIAGNOSIS — I73.9 PVD (PERIPHERAL VASCULAR DISEASE) (HCC): Primary | ICD-10-CM

## 2018-10-12 PROCEDURE — 99024 POSTOP FOLLOW-UP VISIT: CPT | Performed by: INTERNAL MEDICINE

## 2018-10-12 RX ORDER — CILOSTAZOL 100 MG/1
100 TABLET ORAL 2 TIMES DAILY
Qty: 180 TABLET | Refills: 3 | Status: ON HOLD | OUTPATIENT
Start: 2018-10-12 | End: 2019-02-27 | Stop reason: HOSPADM

## 2018-10-12 RX ORDER — CARVEDILOL 12.5 MG/1
12.5 TABLET ORAL 2 TIMES DAILY
Qty: 180 TABLET | Refills: 3 | Status: SHIPPED | OUTPATIENT
Start: 2018-10-12 | End: 2019-05-21 | Stop reason: SDUPTHER

## 2018-10-12 NOTE — COMMUNICATION BODY
10/12/18    PATIENT: Ferrel Gottron. : 1935    Primary Care Provider:   Ny Simeon MD  F:[phone]  Y:[phone]      Reason for evaluation:   Leg fatigue    History of present illness:   Mr. Ferrel Gottron. is an 80 y.o. male patient of Dr. Any Gallo I am seeing in follow up regarding bilateral leg fatigue. He continues to deny any pain and describes it as legs tiring out with inconsistent time intervals. He recently had a pacemaker and says that now that he's had that problem fixed he has been able to focus on other things and feels that his left knee is a large contributor. He has a history of a Right total knee arthroplasty and was told that his left is severe enough to warrant replacement as well. He is not certain he is \"up for any more procedures or surgeries at this age\" and has wanted to Armas things as they are no worse than last visit\". He feels that most of his walking breaks are due to a combination of left knee pain and bilateral leg fatigue- denies classic claudication, color change, rest pain or numbness. He has no history of lumbar spine or sciatica issues and denies any pain radiating from his back. He's had no formal history of venous insufficiency but does have edema with restarting the amlodipine. He had a pacemaker placed 10/4/2018 with Dr. Neftali rBady.      Medical History:      Diagnosis Date    Anxiety     Arthritis     CAD (coronary artery disease)     MI X 2 ,     Cancer ([facility]     BASAL CELL    GERD (gastroesophageal reflux disease)     Hyperlipidemia     Hypertension        Surgical History:      Procedure Laterality Date    APPENDECTOMY      CARDIAC SURGERY      3 V    CARPAL TUNNEL RELEASE      BILAT    CORONARY ANGIOPLASTY WITH STENT PLACEMENT          KNEE ARTHROSCOPY      RT    MOHS SURGERY      on his face    PACEMAKER PLACEMENT  10/04/2018    dual chamber    PROSTATE SURGERY 0.25 mg by mouth 3 times daily as needed. TAKES 1/2 TAB DAILY OR TWO TIMES A DAY AS NEEDED 3/29/10   Historical Provider, MD   fish oil-omega-3 fatty acids 1000 MG capsule Take 1 g by mouth 2 times daily. ON HOLD 1 WEEK PRE-OP 3/29/10   Historical Provider, MD       Allergies:  Penicillins     Review of Systems:   [x]Full ROS obtained and negative except as mentioned in HPI    Physical Examination:    There were no vitals taken for this visit. Wt Readings from Last 3 Encounters:   10/05/18 251 lb 8 oz (114.1 kg)   08/20/18 255 lb 11.2 oz (116 kg)   08/02/18 253 lb 3.2 oz (114.9 kg)       GENERAL: Well developed, well nourished, no acute distress  NEUROLOGICAL: Alert and oriented x3  PSYCH: Normal mood and affect   SKIN: Warm and dry, without lesions  HEENT: Normocephalic, atraumatic, Sclera non-icteric, mucous membranes moist  NECK: supple, JVP normal, thyroid not enlarged   CAROTID: Normal upstroke, no bruits  CARDIAC: Normal PMI, monroe, regular rhythm, normal S1S2, no murmur, rub  RESPIRATORY: Normal respiratory effort, clear to auscultation bilaterally  EXTREMITIES: No cyanosis, clubbing;  both feet examined and without skin breakdown or edema. Palpable pedal pulses (Doppler PT bilateral are brisk biphasic and DP are signfiicantly diminished)  MUSCULOSKELETAL: No joint swelling or tenderness, no chest wall tenderness  GASTROINTESTINAL:  soft, non-tender, no bruit    Labs:  Lab Review   Admission on 10/04/2018, Discharged on 10/05/2018   Component Date Value    Ventricular Rate 10/04/2018 52     Atrial Rate 10/04/2018 52     P-R Interval 10/04/2018 194     QRS Duration 10/04/2018 102     Q-T Interval 10/04/2018 444     QTc Calculation (Bazett) 10/04/2018 412     P Axis 10/04/2018 30     R Axis 10/04/2018 45     T Axis 10/04/2018 56     Diagnosis 10/04/2018 Sinus bradycardiaOtherwise normal ECGWhen compared with ECG of 20-DEC-2010 11:53,Vent.  rate has increased BY  20 BPMConfirmed by Chadwick Frost MD, Moody Venegas

## 2019-01-22 ENCOUNTER — PROCEDURE VISIT (OUTPATIENT)
Dept: CARDIOLOGY CLINIC | Age: 84
End: 2019-01-22
Payer: MEDICARE

## 2019-01-22 ENCOUNTER — OFFICE VISIT (OUTPATIENT)
Dept: CARDIOLOGY CLINIC | Age: 84
End: 2019-01-22
Payer: MEDICARE

## 2019-01-22 VITALS
SYSTOLIC BLOOD PRESSURE: 133 MMHG | HEART RATE: 69 BPM | HEIGHT: 70 IN | BODY MASS INDEX: 36.79 KG/M2 | WEIGHT: 257 LBS | DIASTOLIC BLOOD PRESSURE: 85 MMHG

## 2019-01-22 DIAGNOSIS — Z95.0 PACEMAKER: ICD-10-CM

## 2019-01-22 DIAGNOSIS — R00.1 SYMPTOMATIC BRADYCARDIA: ICD-10-CM

## 2019-01-22 DIAGNOSIS — I73.9 PVD (PERIPHERAL VASCULAR DISEASE) (HCC): Primary | ICD-10-CM

## 2019-01-22 DIAGNOSIS — E78.5 HYPERLIPIDEMIA, UNSPECIFIED HYPERLIPIDEMIA TYPE: ICD-10-CM

## 2019-01-22 DIAGNOSIS — G47.33 OSA (OBSTRUCTIVE SLEEP APNEA): Primary | ICD-10-CM

## 2019-01-22 DIAGNOSIS — I25.10 CORONARY ARTERY DISEASE INVOLVING NATIVE CORONARY ARTERY OF NATIVE HEART WITHOUT ANGINA PECTORIS: ICD-10-CM

## 2019-01-22 DIAGNOSIS — I49.5 TACHY-BRADY SYNDROME (HCC): ICD-10-CM

## 2019-01-22 PROCEDURE — 99214 OFFICE O/P EST MOD 30 MIN: CPT | Performed by: INTERNAL MEDICINE

## 2019-01-22 PROCEDURE — 93280 PM DEVICE PROGR EVAL DUAL: CPT | Performed by: INTERNAL MEDICINE

## 2019-01-22 PROCEDURE — 93000 ELECTROCARDIOGRAM COMPLETE: CPT | Performed by: INTERNAL MEDICINE

## 2019-02-20 RX ORDER — FUROSEMIDE 20 MG/1
40 TABLET ORAL DAILY
Qty: 180 TABLET | Refills: 1 | Status: SHIPPED | OUTPATIENT
Start: 2019-02-20 | End: 2019-11-01 | Stop reason: SDUPTHER

## 2019-02-27 ENCOUNTER — HOSPITAL ENCOUNTER (OUTPATIENT)
Dept: CARDIAC CATH/INVASIVE PROCEDURES | Age: 84
Discharge: HOME | End: 2019-02-27
Attending: INTERNAL MEDICINE | Admitting: INTERNAL MEDICINE
Payer: MEDICARE

## 2019-02-27 VITALS — WEIGHT: 254 LBS | BODY MASS INDEX: 36.36 KG/M2 | HEIGHT: 70 IN

## 2019-02-27 LAB
GFR AFRICAN AMERICAN: >60
GFR NON-AFRICAN AMERICAN: 53
GLUCOSE BLD-MCNC: 173 MG/DL (ref 70–99)
HEMOGLOBIN: 12 G/DL (ref 13.5–17.5)
PERFORMED ON: ABNORMAL
PLATELET # BLD: 148 K/UL (ref 135–450)
POC BUN: 20 MG/DL (ref 7–18)
POC CREATININE: 1.3 MG/DL (ref 0.8–1.3)
POC HEMATOCRIT: 33 % (ref 40.5–52.5)
POC POTASSIUM: 4.3 MMOL/L (ref 3.5–5.1)
POC SAMPLE TYPE: ABNORMAL
POC SODIUM: 141 MMOL/L (ref 136–145)
WBC # BLD: 5.4 K/UL (ref 4–11)

## 2019-02-27 PROCEDURE — 82947 ASSAY GLUCOSE BLOOD QUANT: CPT

## 2019-02-27 PROCEDURE — 84132 ASSAY OF SERUM POTASSIUM: CPT

## 2019-02-27 PROCEDURE — 37224 HC FEM POP TERRITORY PLASTY: CPT | Performed by: INTERNAL MEDICINE

## 2019-02-27 PROCEDURE — 6370000000 HC RX 637 (ALT 250 FOR IP)

## 2019-02-27 PROCEDURE — 2500000003 HC RX 250 WO HCPCS

## 2019-02-27 PROCEDURE — C1894 INTRO/SHEATH, NON-LASER: HCPCS

## 2019-02-27 PROCEDURE — C2623 CATH, TRANSLUMIN, DRUG-COAT: HCPCS

## 2019-02-27 PROCEDURE — 75716 ARTERY X-RAYS ARMS/LEGS: CPT | Performed by: INTERNAL MEDICINE

## 2019-02-27 PROCEDURE — 85014 HEMATOCRIT: CPT

## 2019-02-27 PROCEDURE — 85018 HEMOGLOBIN: CPT

## 2019-02-27 PROCEDURE — 6360000004 HC RX CONTRAST MEDICATION: Performed by: INTERNAL MEDICINE

## 2019-02-27 PROCEDURE — 99223 1ST HOSP IP/OBS HIGH 75: CPT | Performed by: INTERNAL MEDICINE

## 2019-02-27 PROCEDURE — 85048 AUTOMATED LEUKOCYTE COUNT: CPT

## 2019-02-27 PROCEDURE — C1760 CLOSURE DEV, VASC: HCPCS

## 2019-02-27 PROCEDURE — 75625 CONTRAST EXAM ABDOMINL AORTA: CPT | Performed by: INTERNAL MEDICINE

## 2019-02-27 PROCEDURE — 84520 ASSAY OF UREA NITROGEN: CPT

## 2019-02-27 PROCEDURE — 84295 ASSAY OF SERUM SODIUM: CPT

## 2019-02-27 PROCEDURE — 99152 MOD SED SAME PHYS/QHP 5/>YRS: CPT | Performed by: INTERNAL MEDICINE

## 2019-02-27 PROCEDURE — C1725 CATH, TRANSLUMIN NON-LASER: HCPCS

## 2019-02-27 PROCEDURE — 82565 ASSAY OF CREATININE: CPT

## 2019-02-27 PROCEDURE — C1769 GUIDE WIRE: HCPCS

## 2019-02-27 PROCEDURE — 99153 MOD SED SAME PHYS/QHP EA: CPT | Performed by: INTERNAL MEDICINE

## 2019-02-27 PROCEDURE — 37224 PR REVSC OPN/PRG FEM/POP W/ANGIOPLASTY UNI: CPT | Performed by: INTERNAL MEDICINE

## 2019-02-27 PROCEDURE — 2709999900 HC NON-CHARGEABLE SUPPLY

## 2019-02-27 PROCEDURE — 85049 AUTOMATED PLATELET COUNT: CPT

## 2019-02-27 PROCEDURE — 85347 COAGULATION TIME ACTIVATED: CPT

## 2019-02-27 PROCEDURE — 6360000002 HC RX W HCPCS

## 2019-02-27 PROCEDURE — 75774 ARTERY X-RAY EACH VESSEL: CPT | Performed by: INTERNAL MEDICINE

## 2019-02-27 PROCEDURE — C1887 CATHETER, GUIDING: HCPCS

## 2019-02-27 PROCEDURE — 36415 COLL VENOUS BLD VENIPUNCTURE: CPT

## 2019-02-27 RX ORDER — IODIXANOL 320 MG/ML
200 INJECTION, SOLUTION INTRAVASCULAR ONCE
Status: COMPLETED | OUTPATIENT
Start: 2019-02-27 | End: 2019-02-27

## 2019-02-27 RX ORDER — CLOPIDOGREL BISULFATE 75 MG/1
75 TABLET ORAL DAILY
Qty: 30 TABLET | Refills: 5 | Status: SHIPPED | OUTPATIENT
Start: 2019-02-27 | End: 2019-03-26 | Stop reason: SDUPTHER

## 2019-02-27 RX ADMIN — IODIXANOL 200 ML: 320 INJECTION, SOLUTION INTRAVASCULAR at 12:46

## 2019-03-01 ENCOUNTER — TELEPHONE (OUTPATIENT)
Dept: CARDIOLOGY CLINIC | Age: 84
End: 2019-03-01

## 2019-03-01 LAB
POC ACT LR: 217 SEC
POC ACT LR: 238 SEC
POC ACT LR: 268 SEC
POC ACT LR: 319 SEC

## 2019-03-12 ENCOUNTER — OFFICE VISIT (OUTPATIENT)
Dept: CARDIOLOGY CLINIC | Age: 84
End: 2019-03-12
Payer: MEDICARE

## 2019-03-12 VITALS
OXYGEN SATURATION: 97 % | SYSTOLIC BLOOD PRESSURE: 132 MMHG | HEART RATE: 62 BPM | DIASTOLIC BLOOD PRESSURE: 72 MMHG | WEIGHT: 258.8 LBS | BODY MASS INDEX: 37.05 KG/M2 | HEIGHT: 70 IN

## 2019-03-12 DIAGNOSIS — E78.5 HYPERLIPIDEMIA, UNSPECIFIED HYPERLIPIDEMIA TYPE: ICD-10-CM

## 2019-03-12 DIAGNOSIS — I10 ESSENTIAL HYPERTENSION: ICD-10-CM

## 2019-03-12 DIAGNOSIS — I73.9 PVD (PERIPHERAL VASCULAR DISEASE) (HCC): Primary | ICD-10-CM

## 2019-03-12 DIAGNOSIS — I25.10 CORONARY ARTERY DISEASE INVOLVING NATIVE CORONARY ARTERY OF NATIVE HEART WITHOUT ANGINA PECTORIS: ICD-10-CM

## 2019-03-12 PROCEDURE — 99214 OFFICE O/P EST MOD 30 MIN: CPT | Performed by: INTERNAL MEDICINE

## 2019-03-26 RX ORDER — CLOPIDOGREL BISULFATE 75 MG/1
75 TABLET ORAL DAILY
Qty: 30 TABLET | Refills: 5 | Status: SHIPPED | OUTPATIENT
Start: 2019-03-26 | End: 2019-05-21 | Stop reason: SDUPTHER

## 2019-04-23 ENCOUNTER — TELEPHONE (OUTPATIENT)
Dept: CARDIOLOGY CLINIC | Age: 84
End: 2019-04-23

## 2019-04-23 DIAGNOSIS — I10 ESSENTIAL HYPERTENSION: Primary | ICD-10-CM

## 2019-04-23 RX ORDER — LOSARTAN POTASSIUM 100 MG/1
100 TABLET ORAL DAILY
COMMUNITY
End: 2019-04-23

## 2019-04-23 RX ORDER — VALSARTAN 160 MG/1
160 TABLET ORAL DAILY
Qty: 90 TABLET | Refills: 3 | Status: SHIPPED | OUTPATIENT
Start: 2019-04-23 | End: 2019-04-25

## 2019-04-23 NOTE — TELEPHONE ENCOUNTER
Pt wife calling,  [facility] contacted them to let them know that pt script for Losartin has been recalled. Wife explained that pt was taken off of Benicar due to complications and put on Losartin. I could not find Losartin in the list of medication. She advised that they use [facility] on [address] in [facility] for this medication. They need a new script sent over because of the recall. Pls call to advise. Thank you. Well Visit, 12 years to [address] [place] [address] Teen: Care Instructions Your Care Instructions Your teen may be busy with school, sports, clubs, and friends. Your teen may need some help managing his or her time with activities, homework, and getting enough sleep and eating healthy foods. Most young teens tend to focus on themselves as they seek to gain independence. They are learning more ways to solve problems and to think about things. While they are building confidence, they may feel insecure. Their peers may replace you as a source of support and advice. But they still value you and need you to be involved in their life. Follow-up care is a key part of your child's treatment and safety. Be sure to make and go to all appointments, and call your doctor if your child is having problems. It's also a good idea to know your child's test results and keep a list of the medicines your child takes. How can you care for your child at home? Eating and a healthy weight · Encourage healthy eating habits. Your teen needs nutritious meals and healthy snacks each day. Stock up on fruits and vegetables. Have nonfat and low-fat dairy foods available. · Do not eat much fast food. Offer healthy snacks that are low in sugar, fat, and salt instead of candy, chips, and other junk foods. · Encourage your teen to drink water when he or she is thirsty instead of soda or juice drinks. · Make meals a family time, and set a good example by making it an important time of the day for sharing. Healthy habits · Encourage your teen to be active for at least one hour each day. Plan family activities, such as trips to the park, walks, bike rides, swimming, and gardening. · Limit TV or video to no more than 1 or 2 hours a day. Check programs for violence, bad language, and sex. · Do not smoke or allow others to smoke around your teen. If you need help quitting, talk to your doctor about stop-smoking programs and medicines. These can increase your chances of quitting for good. Be a good model so your teen will not want to try smoking. Safety · Make your rules clear and consistent. Be fair and set a good example. · Show your teen that seat belts are important by wearing yours every time you drive. Make sure everyone noa up. · Make sure your teen wears pads and a helmet that fits properly when he or she rides a bike or scooter or when skateboarding or in-line skating. · It is safest not to have a gun in the house. If you do, keep it unloaded and locked up. Lock ammunition in a separate place. · Teach your teen that underage drinking can be harmful. It can lead to making poor choices. Tell your teen to call for a ride if there is any problem with drinking. Parenting · Try to accept the natural changes in your teen and your relationship with him or her. · Know that your teen may not want to do as many family activities. · Respect your teen's privacy. Be clear about any safety concerns you have. · Have clear rules, but be flexible as your teen tries to be more independent. Set consequences for breaking the rules. · Listen when your teen wants to talk. This will build his or her confidence that you care and will work with your teen to have a good relationship. Help your teen decide which activities are okay to do on his or her own, such as staying alone at home or going out with friends. · Spend some time with your teen doing what he or she likes to do. This will help your communication and relationship. Talk about sexuality · Start talking about sexuality early. This will make it less awkward each time. Be patient. Give yourselves time to get comfortable with each other. Start the conversations. Your teen may be interested but too embarrassed to ask. · Create an open environment. Let your teen know that you are always willing to talk. Listen carefully.  This will reduce confusion and help you understand what is truly on your teen's mind. · Communicate your values and beliefs. Your teen can use your values to develop his or her own set of beliefs. · Talk about the pros and cons of not having sex, condom use, and birth control before your teen is sexually active. Talk to your teen about the chance of unwanted pregnancy. If your teen has had unsafe sex, one choice is emergency contraceptive pills (ECPs). ECPs can prevent pregnancy if birth control was not used; but ECPs are most useful if started within 72 hours of having had sex. · Talk to your teen about common STIs (sexually transmitted infections), such as chlamydia. This is a common STI that can cause infertility if it is not treated. Chlamydia screening is recommended yearly for all sexually active young women. School Tell your teen why you think school is important. Show interest in your teen's school. Encourage your teen to join a school team or activity. If your teen is having trouble with classes, get a  for him or her. If your teen is having problems with friends, other students, or teachers, work with your teen and the school staff to find out what is wrong. Immunizations Flu immunization is recommended once a year for all children ages 7 months and older. Talk to your doctor if your teen did not yet get the vaccines for human papillomavirus (HPV), meningococcal disease, and tetanus, diphtheria, and pertussis. When should you call for help? Watch closely for changes in your teen's health, and be sure to contact your doctor if: 
  · You are concerned that your teen is not growing or learning normally for his or her age.  
  · You are worried about your teen's behavior.  
  · You have other questions or concerns. Where can you learn more? Go to http://carrie-rehana.info/. Enter Y079 in the search box to learn more about \"Well Visit, 12 years to Anni Peralta Teen: Care Instructions. \" Current as of: March 27, 2018 Content Version: 11.9 © 5631-6118 [employer]. Care instructions adapted under license by [employer] system (which disclaims liability or warranty for this information). If you have questions about a medical condition or this instruction, always ask your healthcare professional. [address] any warranty or liability for your use of this information.

## 2019-04-23 NOTE — TELEPHONE ENCOUNTER
Per MMK - Substitute Losartan with Valsartan 160 mg daily. E-scribed Valsartan 160 mg daily to [facility] on [address] and notified pt wife.

## 2019-04-24 ENCOUNTER — TELEPHONE (OUTPATIENT)
Dept: CARDIOLOGY CLINIC | Age: 84
End: 2019-04-24

## 2019-04-24 NOTE — TELEPHONE ENCOUNTER
Wife calling asking if this patient could be seen in [place] this week. He needs to discuss his b/p meds . His benicar is on back order , and losartan and valsartan have been recalled . Can he be worked in this week ?

## 2019-04-25 ENCOUNTER — OFFICE VISIT (OUTPATIENT)
Dept: CARDIOLOGY CLINIC | Age: 84
End: 2019-04-25
Payer: MEDICARE

## 2019-04-25 VITALS
HEART RATE: 60 BPM | DIASTOLIC BLOOD PRESSURE: 82 MMHG | SYSTOLIC BLOOD PRESSURE: 144 MMHG | BODY MASS INDEX: 37.22 KG/M2 | WEIGHT: 260 LBS | HEIGHT: 70 IN

## 2019-04-25 DIAGNOSIS — R00.1 SYMPTOMATIC BRADYCARDIA: ICD-10-CM

## 2019-04-25 DIAGNOSIS — I10 ESSENTIAL HYPERTENSION: ICD-10-CM

## 2019-04-25 DIAGNOSIS — G47.30 SLEEP APNEA, UNSPECIFIED TYPE: ICD-10-CM

## 2019-04-25 DIAGNOSIS — I73.9 CLAUDICATION OF BOTH LOWER EXTREMITIES (HCC): ICD-10-CM

## 2019-04-25 DIAGNOSIS — E78.2 MIXED HYPERLIPIDEMIA: ICD-10-CM

## 2019-04-25 DIAGNOSIS — I25.10 CORONARY ARTERY DISEASE INVOLVING NATIVE CORONARY ARTERY OF NATIVE HEART WITHOUT ANGINA PECTORIS: Primary | ICD-10-CM

## 2019-04-25 LAB
ANION GAP SERPL CALCULATED.3IONS-SCNC: 12 MMOL/L (ref 6–18)
BUN BLDV-MCNC: 21 MG/DL (ref 8–26)
CALCIUM SERPL-MCNC: 9.9 MG/DL (ref 8.8–10.1)
CHLORIDE BLD-SCNC: 101 MEQ/L (ref 101–111)
CO2: 28 MMOL/L (ref 22–29)
CREAT SERPL-MCNC: 1.47 MG/DL (ref 0.64–1.27)
GFR AFRICAN AMERICAN: 49 ML/MIN/1.73 M2
GFR NON-AFRICAN AMERICAN: 42 ML/MIN/1.73 M2
GLUCOSE BLD-MCNC: 275 MG/DL (ref 70–99)
POTASSIUM SERPL-SCNC: 4.4 MEQ/L (ref 3.6–5.1)
SODIUM BLD-SCNC: 137 MEQ/L (ref 135–145)

## 2019-04-25 PROCEDURE — 99214 OFFICE O/P EST MOD 30 MIN: CPT | Performed by: INTERNAL MEDICINE

## 2019-04-25 RX ORDER — CHLORAL HYDRATE 500 MG
1000 CAPSULE ORAL DAILY
COMMUNITY
End: 2019-08-13

## 2019-04-25 RX ORDER — LISINOPRIL 10 MG/1
10 TABLET ORAL DAILY
Qty: 90 TABLET | Refills: 3 | Status: SHIPPED | OUTPATIENT
Start: 2019-04-25 | End: 2020-01-23 | Stop reason: SDUPTHER

## 2019-04-25 NOTE — PATIENT INSTRUCTIONS
Stop Losartan  Start Lisinopril 10 mg daily  Work on weight loss  Monitor BP at home  Watch sodium intake  Labs today  Follow up in 1 month

## 2019-04-25 NOTE — PROGRESS NOTES
Yes Toñito Matos, DO   furosemide (LASIX) 20 MG tablet Take 2 tablets by mouth daily  Patient taking differently: Take 20 mg by mouth daily  Yes Nabil Gilbert MD   carvedilol (COREG) 12.5 MG tablet Take 1 tablet by mouth 2 times daily Yes Malia Ruiz, DO   amLODIPine (NORVASC) 10 MG tablet Take 1 tablet by mouth daily  Patient taking differently: Take 10 mg by mouth nightly  Yes Toñito Matos DO   aspirin 81 MG tablet Take 81 mg by mouth nightly  Yes Historical Provider, MD   ranitidine (ZANTAC) 300 MG tablet Take 300 mg by mouth daily  Yes Historical Provider, MD   glimepiride (AMARYL) 2 MG tablet Take 2 mg by mouth every morning (before breakfast) Yes Historical Provider, MD   alprazolam (XANAX) 0.25 MG tablet Take 0.25 mg by mouth 3 times daily as needed. TAKES 1/2 TAB DAILY OR TWO TIMES A DAY AS NEEDED Yes Historical Provider, MD   valsartan (DIOVAN) 160 MG tablet Take 1 tablet by mouth daily  Nabil Gilbert MD       [x] Medications and dosages reviewed.     ROS:  [x]Full ROS obtained and negative except as mentioned in HPI      Physical Examination:    Vitals:    04/25/19 1115   BP: (!) 144/82   Pulse:       BP (!) 144/82 (Site: Left Upper Arm, Position: Sitting, Cuff Size: Large Adult)   Pulse 60   Ht 5' 10\" (1.778 m)   Wt 260 lb (117.9 kg)   BMI 37.31 kg/m²     · GENERAL: overweight male in NAD  · NEUROLOGICAL: Alert and oriented, no motor abnormalities  · PSYCH: Calm affect  · SKIN: Warm and dry, Several healing wounds on Face from skin Ca removal  · HEENT: Normocephalic, Sclera non-icteric, mucus membranes moist  · NECK: supple, JVP normal  · CAROTID: Normal upstroke, no bruits  · CARDIAC: Normal PMI, Regular rate and rhythm, normal S1S2, no murmur, rub, or gallop  · RESPIRATORY: Normal respiratory effort, clear to auscultation bilaterally  · EXTREMITIES: trace edema  · MUSCULOSKELETAL: No joint swelling or tenderness, no chest wall tenderness  · GASTROINTESTINAL: normal bowel sounds, soft, non-tender, no bruit      Assessment:       HTN:  Try lisinopril 10 in place of ARB  Chem today  F/u 1 month      CAD:  Myoview 8/2018 fixed inferior scar vs attenuation. Stable. Medical management. S/P CABG 2007  Negative GXT [place] 2015  Normal LV function 2015 as well on ECHO  myoview 2012 \"inferior wall attenuation\"    If cannot reach target will need dobutamine due to bradycardia. Hyperlipidemia:  Controlled. On Pravastatin. LDL=60. Same meds    Sleep Apnea: On CPAP.  Has not been wearing due to skin Ca removal. Controlled    Bradycardia:  Resolved s/p pacer    Claudication:  PCI left leg  Difficult to tell if different  Won't walk due to knee issues    \"Swelling\"  Low sodium  Extra lasix x 2 days  Watch renal function    Plan:  Low sodium  Lisinopril 10  Labs  F/u 1 month    Thank you for allowing me to participate in the care of this individual.      Orion Duron M.D., [facility]

## 2019-04-26 ENCOUNTER — TELEPHONE (OUTPATIENT)
Dept: CARDIOLOGY CLINIC | Age: 84
End: 2019-04-26

## 2019-04-26 NOTE — TELEPHONE ENCOUNTER
I spoke with pt and relayed message per MMK.  He verbalized understanding and lab order was placed in the mail

## 2019-04-26 NOTE — TELEPHONE ENCOUNTER
----- Message from Andrés Bowen RN sent at 4/26/2019 12:27 PM EDT -----  MMK reviewed. Kidney function stable. Sugar elevated. Will need a BMP a few days prior to next appt. Please call patient and send order slip in mail.

## 2019-04-30 ENCOUNTER — NURSE ONLY (OUTPATIENT)
Dept: CARDIOLOGY CLINIC | Age: 84
End: 2019-04-30
Payer: MEDICARE

## 2019-04-30 DIAGNOSIS — R00.1 SYMPTOMATIC BRADYCARDIA: ICD-10-CM

## 2019-04-30 DIAGNOSIS — I49.5 TACHY-BRADY SYNDROME (HCC): ICD-10-CM

## 2019-04-30 DIAGNOSIS — Z95.0 PACEMAKER: Primary | ICD-10-CM

## 2019-04-30 PROCEDURE — 93294 REM INTERROG EVL PM/LDLS PM: CPT | Performed by: INTERNAL MEDICINE

## 2019-04-30 PROCEDURE — 93296 REM INTERROG EVL PM/IDS: CPT | Performed by: INTERNAL MEDICINE

## 2019-04-30 NOTE — LETTER
[address] [phone]  [address] [facility]    Pacemaker/Defibrillator Clinic          05/02/19        [address] [facility] [address] [place] [address]        Dear Sulma Yun. This letter is to inform you that we received the transmission from your monitor at home that checks your pacemaker and/or defibrillator, or implanted heart monitor. The next date your monitor will automatically transmit will be 9/3/19. Your device and monitor are wireless and most transmit cellularly, but please periodically check your monitor is still plugged in to the electrical outlet. If you still use the telephone land line to send please ensure the connection to the phone stella is secure. This will help to ensure successful automatic transmissions in the future. Also, the monitor needs to be close to you while sleeping at night. Please be aware that the remote device transmission sites are periodically monitored only during regular business hours during which simultaneous in-office device clinics are being run. If your transmission requires attention, we will contact you as soon as possible. Thank you.             Perla Scott

## 2019-05-20 LAB
ANION GAP SERPL CALCULATED.3IONS-SCNC: 15 MMOL/L (ref 6–18)
BUN BLDV-MCNC: 22 MG/DL (ref 8–26)
CALCIUM SERPL-MCNC: 10.4 MG/DL (ref 8.8–10.1)
CHLORIDE BLD-SCNC: 99 MEQ/L (ref 101–111)
CO2: 29 MMOL/L (ref 22–29)
CREAT SERPL-MCNC: 1.5 MG/DL (ref 0.64–1.27)
GFR AFRICAN AMERICAN: 48 ML/MIN/1.73 M2
GFR NON-AFRICAN AMERICAN: 41 ML/MIN/1.73 M2
GLUCOSE BLD-MCNC: 170 MG/DL (ref 70–99)
POTASSIUM SERPL-SCNC: 4.6 MEQ/L (ref 3.6–5.1)
SODIUM BLD-SCNC: 138 MEQ/L (ref 135–145)

## 2019-05-21 ENCOUNTER — OFFICE VISIT (OUTPATIENT)
Dept: CARDIOLOGY CLINIC | Age: 84
End: 2019-05-21
Payer: MEDICARE

## 2019-05-21 VITALS
BODY MASS INDEX: 37.47 KG/M2 | WEIGHT: 261.7 LBS | DIASTOLIC BLOOD PRESSURE: 73 MMHG | OXYGEN SATURATION: 98 % | SYSTOLIC BLOOD PRESSURE: 139 MMHG | HEIGHT: 70 IN | HEART RATE: 60 BPM

## 2019-05-21 DIAGNOSIS — R00.1 BRADYCARDIA: ICD-10-CM

## 2019-05-21 DIAGNOSIS — R60.9 EDEMA, UNSPECIFIED TYPE: ICD-10-CM

## 2019-05-21 DIAGNOSIS — I10 ESSENTIAL HYPERTENSION: ICD-10-CM

## 2019-05-21 DIAGNOSIS — Z95.0 PACEMAKER: ICD-10-CM

## 2019-05-21 DIAGNOSIS — G47.30 SLEEP APNEA, UNSPECIFIED TYPE: ICD-10-CM

## 2019-05-21 DIAGNOSIS — E11.9 TYPE 2 DIABETES MELLITUS WITHOUT COMPLICATION, WITHOUT LONG-TERM CURRENT USE OF INSULIN (HCC): ICD-10-CM

## 2019-05-21 DIAGNOSIS — I73.9 CLAUDICATION OF BOTH LOWER EXTREMITIES (HCC): ICD-10-CM

## 2019-05-21 DIAGNOSIS — E78.2 MIXED HYPERLIPIDEMIA: ICD-10-CM

## 2019-05-21 DIAGNOSIS — N28.9 RENAL INSUFFICIENCY: ICD-10-CM

## 2019-05-21 DIAGNOSIS — I25.10 CORONARY ARTERY DISEASE INVOLVING NATIVE CORONARY ARTERY OF NATIVE HEART WITHOUT ANGINA PECTORIS: Primary | ICD-10-CM

## 2019-05-21 PROCEDURE — 99214 OFFICE O/P EST MOD 30 MIN: CPT | Performed by: INTERNAL MEDICINE

## 2019-05-21 RX ORDER — CLOPIDOGREL BISULFATE 75 MG/1
75 TABLET ORAL DAILY
Qty: 90 TABLET | Refills: 3 | Status: SHIPPED | OUTPATIENT
Start: 2019-05-21 | End: 2020-01-23 | Stop reason: ALTCHOICE

## 2019-05-21 RX ORDER — AMLODIPINE BESYLATE 10 MG/1
10 TABLET ORAL NIGHTLY
Qty: 90 TABLET | Refills: 3 | Status: SHIPPED | OUTPATIENT
Start: 2019-05-21 | End: 2020-01-23 | Stop reason: SDUPTHER

## 2019-05-21 RX ORDER — CARVEDILOL 12.5 MG/1
12.5 TABLET ORAL 2 TIMES DAILY
Qty: 180 TABLET | Refills: 3 | Status: SHIPPED | OUTPATIENT
Start: 2019-05-21 | End: 2020-01-23 | Stop reason: SDUPTHER

## 2019-05-21 RX ORDER — PRAVASTATIN SODIUM 20 MG
20 TABLET ORAL DAILY
Qty: 90 TABLET | Refills: 3 | Status: SHIPPED | OUTPATIENT
Start: 2019-05-21 | End: 2020-01-23 | Stop reason: SDUPTHER

## 2019-05-21 NOTE — PROGRESS NOTES
tablet TAKE 1 TABLET BY MOUTH DAILY Yes Daniel Stevens MD   clopidogrel (PLAVIX) 75 MG tablet Take 1 tablet by mouth daily Yes [facility] DO   furosemide (LASIX) 20 MG tablet Take 2 tablets by mouth daily  Patient taking differently: Take 20 mg by mouth daily  Yes Shayna Lopez MD   carvedilol (COREG) 12.5 MG tablet Take 1 tablet by mouth 2 times daily Yes [facility] ([facility] [place]), DO   amLODIPine (NORVASC) 10 MG tablet Take 1 tablet by mouth daily  Patient taking differently: Take 10 mg by mouth nightly  Yes [facility] DO   aspirin 81 MG tablet Take 81 mg by mouth nightly  Yes Historical Provider, MD   ranitidine (ZANTAC) 300 MG tablet Take 300 mg by mouth daily  Yes Historical Provider, MD   glimepiride (AMARYL) 2 MG tablet Take 2 mg by mouth every morning (before breakfast) Yes Historical Provider, MD   alprazolam (XANAX) 0.25 MG tablet Take 0.25 mg by mouth 3 times daily as needed. TAKES 1/2 TAB DAILY OR TWO TIMES A DAY AS NEEDED Yes Historical Provider, MD       [x] Medications and dosages reviewed.     ROS:  [x]Full ROS obtained and negative except as mentioned in HPI      Physical Examination:    Vitals:    05/21/19 0948   BP: 139/73   Pulse:    SpO2:       /73 (Site: Left Upper Arm, Position: Sitting, Cuff Size: Medium Adult)   Pulse 60   Ht 5' 10\" (1.778 m)   Wt 261 lb 11.2 oz (118.7 kg)   SpO2 98%   BMI 37.55 kg/m²     · GENERAL: overweight male in NAD  · NEUROLOGICAL: Alert and oriented, no motor abnormalities  · PSYCH: Calm affect  · SKIN: Warm and dry, Several healing wounds on Face from skin Ca removal  · HEENT: Normocephalic, Sclera non-icteric, mucus membranes moist  · NECK: supple, JVP normal  · CAROTID: Normal upstroke, no bruits  · CARDIAC: Normal PMI, regular rate and rhythm, normal S1S2, no murmur, rub, or gallop  · RESPIRATORY: Normal respiratory effort, clear to auscultation bilaterally  · EXTREMITIES: trace edema  · MUSCULOSKELETAL: No joint swelling or tenderness, no chest wall tenderness  · GASTROINTESTINAL: normal bowel sounds, soft, non-tender, no bruit      Assessment:       HTN:  Now on cozaar 25. Labs fairly stable, but creat 1.5  Refer to nephrology  Repeat chem with HgBA1c 2 weeks-Sees Niraj Galvin in June      CAD:  Stable without angina. Medical Rx  Myoview 8/2018 fixed inferior scar vs attenuation. S/P CABG 2007  Negative GXT [place] 2015  Normal LV function 2015 as well on ECHO  myoview 2012 \"inferior wall attenuation\"      Hyperlipidemia:  Controlled. On Pravastatin. LDL=60. Same Rx    Sleep Apnea: On CPAP. Has not been wearing due to skin Ca removal. Controlled    Bradycardia:  Resolved s/p pacer. Regular checks    Claudication:  PCI left leg  Difficult to tell if different  Won't walk due to knee issues    \"Swelling\"  Low sodium  Improved.   Follow renal function    Plan:  Same meds  F/u Dr. Niraj Galvin few weeks  Repeat Chem and HgBA1c prior  Nephrology referral. He is very concerned about renal function as sister started dialysis  F/u me 3 months    Thank you for allowing me to participate in the care of this individual.      Shahla Victor M.D., [facility]

## 2019-05-21 NOTE — PATIENT INSTRUCTIONS
Can take benadryl or Claritin for sinus problems  Labs in 2 weeks  Continue current medications  Watch sodium intake and work on regular exercise and weight loss  Follow up in 3 months    Refer to kidney doctor  [facility] [address] [place] [address], [address] [place] [address]  [phone]

## 2019-07-16 NOTE — PROGRESS NOTES
19      PATIENT: Annie Estrada : 1935    Primary Care Provider:   Pravin Kowalski MD  Q:[phone]  D:[phone]      Reason for evaluation:   Chief Complaint   Patient presents with    Hypertension     no cardiac complaints at this time    Bradycardia    Coronary Artery Disease    6 Month Follow-Up     vascular doppler today         History of present illness:   Mr. Annie Estrada is an 80 y.o. male patient of Dr. Kylah Saucedo here today in routine peripheral vascular follow up following 19 left SFA intervention. He and his wife both feel that his leg claudication has improved. They both feel that his remaining symptoms are different, reporting paresthesias and shooting pains with concern for low back. He does find relief at grocery leaning forward on cart for breaks. Denies color change, skin breakdown, history of ulcer or rest pain.      Medical History:      Diagnosis Date    Anxiety     Arthritis     CAD (coronary artery disease)     MI X 2 ,     Cancer ([address]     BASAL CELL    GERD (gastroesophageal reflux disease)     Hyperlipidemia     Hypertension        Surgical History:      Procedure Laterality Date    APPENDECTOMY      CARDIAC SURGERY      3 V    CARPAL TUNNEL RELEASE      BILAT    CORONARY ANGIOPLASTY WITH STENT PLACEMENT          KNEE ARTHROSCOPY      RT    MOHS SURGERY      on his face    PACEMAKER PLACEMENT  10/04/2018    dual chamber    PROSTATE SURGERY      TONSILLECTOMY      TURP      TURP  2010    CYSTO INTERNAL URETHROTOMY       Social History:  Social History     Socioeconomic History    Marital status:      Spouse name: Not on file    Number of children: Not on file    Years of education: Not on file    Highest education level: Not on file   Occupational History    Not on file   Social Needs    Financial resource strain: Not on file    Food insecurity: tenderness, no chest wall tenderness  · GASTROINTESTINAL: normal bowel sounds, soft, non-tender      Labs:  Lab Review   No visits with results within 2 Month(s) from this visit. Latest known visit with results is:   Office Visit on 04/25/2019   Component Date Value    BUN 04/25/2019 21     Sodium 04/25/2019 137     Potassium 04/25/2019 4.4     Chloride 04/25/2019 101     CO2 04/25/2019 28     Glucose 04/25/2019 275*    CREATININE 04/25/2019 1.47*    Anion Gap 04/25/2019 12     Calcium 04/25/2019 9.9     GFR Non- 04/25/2019 42*    GFR  04/25/2019 49*    BUN 05/20/2019 22     Sodium 05/20/2019 138     Potassium 05/20/2019 4.6     Chloride 05/20/2019 99*    CO2 05/20/2019 29     Glucose 05/20/2019 170*    CREATININE 05/20/2019 1.50*    Anion Gap 05/20/2019 15     Calcium 05/20/2019 10.4*    GFR Non- 05/20/2019 41*    GFR  05/20/2019 48*         Imaging:  I have reviewed the below testing personally:    VASCULAR:   5/3/17  There is no evidence of deep or superficial venous thrombus in the bilateral lower extremities. 6/1/18 CTA Abdominal aorta with bilateral lower extremity runoff  VASCULAR       There is a moderate amount of atherosclerotic plaque involving the abdominal   aorta.  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  There is minimal narrowing at the   origin of the superior mesenteric artery.  There are single bilateral renal   arteries which are patent.  The inferior mesenteric artery is patent.    Moderate amount of calcified plaque is seen involving the distal abdominal   aorta without flow limiting stenosis. Tasha Nu is mild stenosis at the origin   of both common iliac arteries.  Both internal and

## 2019-07-17 ENCOUNTER — HOSPITAL ENCOUNTER (OUTPATIENT)
Dept: VASCULAR LAB | Age: 84
Discharge: HOME | End: 2019-07-17
Payer: MEDICARE

## 2019-07-17 ENCOUNTER — OFFICE VISIT (OUTPATIENT)
Dept: CARDIOLOGY CLINIC | Age: 84
End: 2019-07-17
Payer: MEDICARE

## 2019-07-17 VITALS
OXYGEN SATURATION: 95 % | BODY MASS INDEX: 36.98 KG/M2 | SYSTOLIC BLOOD PRESSURE: 120 MMHG | HEART RATE: 60 BPM | HEIGHT: 70 IN | WEIGHT: 258.3 LBS | DIASTOLIC BLOOD PRESSURE: 60 MMHG

## 2019-07-17 DIAGNOSIS — I25.10 CORONARY ARTERY DISEASE INVOLVING NATIVE CORONARY ARTERY OF NATIVE HEART WITHOUT ANGINA PECTORIS: ICD-10-CM

## 2019-07-17 DIAGNOSIS — E78.2 MIXED HYPERLIPIDEMIA: ICD-10-CM

## 2019-07-17 DIAGNOSIS — I73.9 PVD (PERIPHERAL VASCULAR DISEASE) (HCC): Primary | ICD-10-CM

## 2019-07-17 DIAGNOSIS — Z95.0 PACEMAKER: ICD-10-CM

## 2019-07-17 DIAGNOSIS — I49.5 TACHY-BRADY SYNDROME (HCC): ICD-10-CM

## 2019-07-17 DIAGNOSIS — I73.9 PVD (PERIPHERAL VASCULAR DISEASE) (HCC): ICD-10-CM

## 2019-07-17 DIAGNOSIS — I10 ESSENTIAL HYPERTENSION: ICD-10-CM

## 2019-07-17 DIAGNOSIS — G47.30 SLEEP APNEA, UNSPECIFIED TYPE: ICD-10-CM

## 2019-07-17 PROCEDURE — 99214 OFFICE O/P EST MOD 30 MIN: CPT | Performed by: INTERNAL MEDICINE

## 2019-07-17 PROCEDURE — 93926 LOWER EXTREMITY STUDY: CPT

## 2019-07-17 NOTE — LETTER
[address] [address]  Phone: [phone]  Fax: [phone]    Leila Prieto,         2019     Doris Guevara MD  [facility]    Patient: Tracie Sainz.  MR Number: [id number]  YOB: 1935  Date of Visit: 2019    Dear Dr. Doris Guevara:                                            19      PATIENT: Tracie Sainz. : 1935    Primary Care Provider:   oDris Guevara MD  M:[phone]  J:[phone]      Reason for evaluation:   Chief Complaint   Patient presents with    Hypertension     no cardiac complaints at this time    Bradycardia    Coronary Artery Disease    6 Month Follow-Up     vascular doppler today         History of present illness:   Mr. Tracie Sainz. is an 80 y.o. male patient of Dr. Terrence Pickering here today in routine peripheral vascular follow up following 19 left SFA intervention. He and his wife both feel that his leg claudication has improved. They both feel that his remaining symptoms are different, reporting paresthesias and shooting pains with concern for low back. He does find relief at grocery leaning forward on cart for breaks. Denies color change, skin breakdown, history of ulcer or rest pain.      Medical History:      Diagnosis Date    Anxiety     Arthritis     CAD (coronary artery disease)     MI X 2 ,     Cancer ([facility] [address]     BASAL CELL    GERD (gastroesophageal reflux disease)     Hyperlipidemia     Hypertension        Surgical History:      Procedure Laterality Date    APPENDECTOMY      CARDIAC SURGERY      3 V    CARPAL TUNNEL RELEASE      BILAT    CORONARY ANGIOPLASTY WITH STENT PLACEMENT          KNEE ARTHROSCOPY      RT    MOHS SURGERY      on his face    PACEMAKER PLACEMENT  10/04/2018    dual chamber    PROSTATE SURGERY      TONSILLECTOMY      TURP · HEENT: Normocephalic, atraumatic, Sclera non-icteric, mucous membranes moist  · NECK: supple, JVP normal  · CARDIAC: Normal PMI, regular rate and rhythm, normal S1S2, no murmur, rub, or gallop  · RESPIRATORY: Normal respiratory effort, clear to auscultation bilaterally  EXTREMITIES: no edema or clubbing, +2 pulses bilaterally Palpable pedal pulses (Doppler PT left brisk biphasic and DP monophasic improved)  ·   · MUSCULOSKELETAL: No joint swelling or tenderness, no chest wall tenderness  · GASTROINTESTINAL: normal bowel sounds, soft, non-tender      Labs:  Lab Review   No visits with results within 2 Month(s) from this visit. Latest known visit with results is:   Office Visit on 04/25/2019   Component Date Value    BUN 04/25/2019 21     Sodium 04/25/2019 137     Potassium 04/25/2019 4.4     Chloride 04/25/2019 101     CO2 04/25/2019 28     Glucose 04/25/2019 275*    CREATININE 04/25/2019 1.47*    Anion Gap 04/25/2019 12     Calcium 04/25/2019 9.9     GFR Non- 04/25/2019 42*    GFR  04/25/2019 49*    BUN 05/20/2019 22     Sodium 05/20/2019 138     Potassium 05/20/2019 4.6     Chloride 05/20/2019 99*    CO2 05/20/2019 29     Glucose 05/20/2019 170*    CREATININE 05/20/2019 1.50*    Anion Gap 05/20/2019 15     Calcium 05/20/2019 10.4*    GFR Non- 05/20/2019 41*    GFR  05/20/2019 48*         Imaging:  I have reviewed the below testing personally:    VASCULAR:   5/3/17  There is no evidence of deep or superficial venous thrombus in the bilateral lower extremities.     6/1/18 CTA Abdominal aorta with bilateral lower extremity runoff  VASCULAR       There is a moderate amount of atherosclerotic plaque involving the abdominal   aorta.  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  There is minimal narrowing at the   origin of the superior mesenteric artery.  There are single bilateral renal   arteries which are patent.  The inferior mesenteric artery is patent. Moderate amount of calcified plaque is seen involving the distal abdominal   aorta without flow limiting stenosis. Kenna Lar[place] is mild stenosis at the origin   of both common iliac arteries.  Both internal and external iliac arteries are   patent without flow limiting stenosis.       Right leg:  There is mild narrowing of the common femoral artery due to   calcified and noncalcified plaque.  The profunda is patent and unremarkable. There is mild stenosis of the superficial femoral artery at its origin with   approximately 40% luminal narrowing.  There is mild multilevel stenosis of   the superficial femoral artery due to large amount of calcified plaque. Luminal stenosis measures up to 50%.  Portions of popliteal are poorly   visualized due to streak artifact from the patient's knee prosthesis.  There   is mild multilevel stenosis involving the popliteal artery.  There is   standard branching of the tibioperoneal trunk.  Anterior tibial artery is   occluded the level the upper calf.  Peroneal and posterior tibial arteries   are diminutive.  Peroneal artery is occluded above the level of the ankle.    Posterior tibial artery crosses the ankle to supply the foot.       Left leg:  There is mild narrowing of the common femoral artery.  Profunda   and superficial femoral arteries are patent.  Mild narrowing at their origins   due to calcified plaque.  There is mild multilevel stenosis of the   superficial femoral artery due to calcified plaque.  There is short-segment   focal stenosis of the superficial femoral artery in the adductor canal with   approximately 70% stenosis.  There is moderate narrowing of the distal

## 2019-07-18 ENCOUNTER — TELEPHONE (OUTPATIENT)
Dept: CARDIOLOGY CLINIC | Age: 84
End: 2019-07-18

## 2019-08-13 ENCOUNTER — TELEPHONE (OUTPATIENT)
Dept: CARDIOLOGY CLINIC | Age: 84
End: 2019-08-13

## 2019-08-13 ENCOUNTER — OFFICE VISIT (OUTPATIENT)
Dept: CARDIOLOGY CLINIC | Age: 84
End: 2019-08-13
Payer: MEDICARE

## 2019-08-13 VITALS
HEART RATE: 60 BPM | SYSTOLIC BLOOD PRESSURE: 138 MMHG | DIASTOLIC BLOOD PRESSURE: 64 MMHG | OXYGEN SATURATION: 98 % | BODY MASS INDEX: 36.65 KG/M2 | HEIGHT: 70 IN | WEIGHT: 256 LBS

## 2019-08-13 DIAGNOSIS — I25.10 CORONARY ARTERY DISEASE INVOLVING NATIVE CORONARY ARTERY OF NATIVE HEART WITHOUT ANGINA PECTORIS: Primary | ICD-10-CM

## 2019-08-13 DIAGNOSIS — R00.1 SYMPTOMATIC BRADYCARDIA: ICD-10-CM

## 2019-08-13 DIAGNOSIS — I73.9 CLAUDICATION OF BOTH LOWER EXTREMITIES (HCC): ICD-10-CM

## 2019-08-13 DIAGNOSIS — I10 ESSENTIAL HYPERTENSION: Primary | ICD-10-CM

## 2019-08-13 DIAGNOSIS — I25.10 CORONARY ARTERY DISEASE INVOLVING NATIVE CORONARY ARTERY OF NATIVE HEART WITHOUT ANGINA PECTORIS: ICD-10-CM

## 2019-08-13 DIAGNOSIS — G47.30 SLEEP APNEA, UNSPECIFIED TYPE: ICD-10-CM

## 2019-08-13 DIAGNOSIS — E78.2 MIXED HYPERLIPIDEMIA: ICD-10-CM

## 2019-08-13 PROCEDURE — 99214 OFFICE O/P EST MOD 30 MIN: CPT | Performed by: INTERNAL MEDICINE

## 2019-08-13 NOTE — PATIENT INSTRUCTIONS
Follow blood pressure at home 3-4 times a week  Take it at least 1 hour after medications  Take after resting for about 5 min   Record date and blood pressure and bring list and cuff to next appt  Labs today or tomorrow  Follow up in 3 months

## 2019-08-13 NOTE — PROGRESS NOTES
(LASIX) 20 MG tablet Take 2 tablets by mouth daily  Patient taking differently: Take 20 mg by mouth 2 times daily  Yes Keke Pritchett MD   aspirin 81 MG tablet Take 81 mg by mouth nightly  Yes Historical Provider, MD   ranitidine (ZANTAC) 300 MG tablet Take 300 mg by mouth daily  Yes Historical Provider, MD   glimepiride (AMARYL) 2 MG tablet Take 2 mg by mouth every morning (before breakfast) Yes Historical Provider, MD   alprazolam (XANAX) 0.25 MG tablet Take 0.25 mg by mouth 3 times daily as needed. TAKES 1/2 TAB DAILY OR TWO TIMES A DAY AS NEEDED Yes Historical Provider, MD       [x] Medications and dosages reviewed. ROS:  [x]Full ROS obtained and negative except as mentioned in HPI      Physical Examination:    Vitals:    08/13/19 1031   BP: 138/64   Pulse: 60   SpO2: 98%      /64 (Site: Left Upper Arm, Position: Sitting, Cuff Size: Large Adult)   Pulse 60   Ht 5' 10\" (1.778 m)   Wt 256 lb (116.1 kg)   SpO2 98%   BMI 36.73 kg/m²     · GENERAL: overweight male in NAD  · NEUROLOGICAL: Alert and oriented, no motor abnormalities  · PSYCH: Calm affect  · SKIN: Warm and dry, Several healing wounds on Face from skin Ca removal  · HEENT: Normocephalic, Sclera non-icteric, mucus membranes moist  · NECK: supple, JVP normal  · CAROTID: Normal upstroke, no bruits  · CARDIAC: Normal PMI, regular rate and rhythm, normal S1S2, No murmur, rub, or gallop  · RESPIRATORY: Normal respiratory effort, clear to auscultation bilaterally  · EXTREMITIES: trace LE edema  · MUSCULOSKELETAL: No joint swelling or tenderness, no chest wall tenderness  · GASTROINTESTINAL: normal bowel sounds, soft, non-tender, no bruit      Assessment:       HTN:  Now on Lisinopril 10  Labs fairly stable, but creat 1.5  Needs repeat  Refered to nephrology  Needs to follow BP at home and bring me list  BP OK today      CAD:  Remains stable without angina. Continue medical Rx  Myoview 8/2018 fixed inferior scar vs attenuation.     S/P CABG

## 2019-08-14 LAB
ANION GAP SERPL CALCULATED.3IONS-SCNC: 16 MMOL/L (ref 6–18)
BUN BLDV-MCNC: 20 MG/DL (ref 8–26)
CALCIUM SERPL-MCNC: 10.1 MG/DL (ref 8.8–10.1)
CHLORIDE BLD-SCNC: 101 MEQ/L (ref 101–111)
CO2: 29 MMOL/L (ref 22–29)
CREAT SERPL-MCNC: 1.37 MG/DL (ref 0.64–1.27)
GFR AFRICAN AMERICAN: 53 ML/MIN/1.73 M2
GFR NON-AFRICAN AMERICAN: 46 ML/MIN/1.73 M2
GLUCOSE BLD-MCNC: 176 MG/DL (ref 70–99)
POTASSIUM SERPL-SCNC: 4.7 MEQ/L (ref 3.6–5.1)
SODIUM BLD-SCNC: 141 MEQ/L (ref 135–145)

## 2019-08-15 ENCOUNTER — TELEPHONE (OUTPATIENT)
Dept: CARDIOLOGY CLINIC | Age: 84
End: 2019-08-15

## 2019-08-15 NOTE — TELEPHONE ENCOUNTER
MMK reviewed lab results. Labs are stable. Creatinine improved. Sugar elevated. FU as planned. Called and spoke with patient.

## 2019-09-03 ENCOUNTER — NURSE ONLY (OUTPATIENT)
Dept: CARDIOLOGY CLINIC | Age: 84
End: 2019-09-03
Payer: MEDICARE

## 2019-09-03 ENCOUNTER — TELEPHONE (OUTPATIENT)
Dept: CARDIOLOGY CLINIC | Age: 84
End: 2019-09-03

## 2019-09-03 DIAGNOSIS — Z95.0 PACEMAKER: ICD-10-CM

## 2019-09-03 DIAGNOSIS — R00.1 SYMPTOMATIC BRADYCARDIA: ICD-10-CM

## 2019-09-03 DIAGNOSIS — I49.5 TACHY-BRADY SYNDROME (HCC): ICD-10-CM

## 2019-09-03 PROCEDURE — 93296 REM INTERROG EVL PM/IDS: CPT | Performed by: INTERNAL MEDICINE

## 2019-09-03 PROCEDURE — 93294 REM INTERROG EVL PM/LDLS PM: CPT | Performed by: INTERNAL MEDICINE

## 2019-09-03 NOTE — LETTER
[address] [facility] [phone]  [address] [phone]    Pacemaker/Defibrillator Clinic          09/05/19        76 [facility] [address] [place], [place] [id number]        Dear Marlene Jiang. This letter is to inform you that we received the transmission from your monitor at home that checks your pacemaker and/or defibrillator, or implanted heart monitor. Your pacemaker shows normal function. The next date your monitor will automatically transmit will be 1/7/20. Your device and monitor are wireless and most transmit cellularly, but please periodically check your monitor is still plugged in to the electrical outlet. If you still use the telephone land line to send please ensure the connection to the phone stella is secure. This will help to ensure successful automatic transmissions in the future. Also, the monitor needs to be close to you while sleeping at night. Please be aware that the remote device transmission sites are periodically monitored only during regular business hours during which simultaneous in-office device clinics are being run. If your transmission requires attention, we will contact you as soon as possible. Thank you.             [facility]

## 2019-10-14 PROBLEM — Z86.0100 HISTORY OF COLONIC POLYPS: Status: ACTIVE | Noted: 2017-06-27

## 2019-10-14 PROBLEM — Z95.0 S/P PLACEMENT OF CARDIAC PACEMAKER: Status: ACTIVE | Noted: 2018-10-11

## 2019-10-14 PROBLEM — E66.9 OBESITY, UNSPECIFIED OBESITY SEVERITY, UNSPECIFIED OBESITY TYPE: Status: ACTIVE | Noted: 2019-10-14

## 2019-10-14 PROBLEM — K21.9 GASTROESOPHAGEAL REFLUX DISEASE WITHOUT ESOPHAGITIS: Status: ACTIVE | Noted: 2017-06-27

## 2019-10-14 PROBLEM — R80.9 PROTEINURIA: Status: ACTIVE | Noted: 2019-10-14

## 2019-10-14 PROBLEM — N18.30 CKD (CHRONIC KIDNEY DISEASE), STAGE III (HCC): Status: ACTIVE | Noted: 2019-10-14

## 2019-10-14 PROBLEM — Z86.010 HISTORY OF COLONIC POLYPS: Status: ACTIVE | Noted: 2017-06-27

## 2019-10-14 PROBLEM — Z95.1 HISTORY OF CORONARY ARTERY BYPASS GRAFT: Status: ACTIVE | Noted: 2017-06-27

## 2019-10-14 PROBLEM — F41.1 GENERALIZED ANXIETY DISORDER: Status: ACTIVE | Noted: 2017-06-27

## 2019-10-14 PROBLEM — I65.23 ASYMPTOMATIC BILATERAL CAROTID ARTERY STENOSIS: Status: ACTIVE | Noted: 2017-06-27

## 2019-10-14 PROBLEM — E11.8 TYPE 2 DIABETES MELLITUS WITH COMPLICATION, WITHOUT LONG-TERM CURRENT USE OF INSULIN (HCC): Status: ACTIVE | Noted: 2017-06-27

## 2019-10-14 PROBLEM — Z95.5 HISTORY OF CORONARY ARTERY STENT PLACEMENT: Status: ACTIVE | Noted: 2017-06-27

## 2019-10-14 PROBLEM — I50.32 CHRONIC DIASTOLIC CONGESTIVE HEART FAILURE (HCC): Status: ACTIVE | Noted: 2018-03-15

## 2019-11-01 ENCOUNTER — OFFICE VISIT (OUTPATIENT)
Dept: CARDIOLOGY CLINIC | Age: 84
End: 2019-11-01
Payer: MEDICARE

## 2019-11-01 VITALS
BODY MASS INDEX: 36.48 KG/M2 | DIASTOLIC BLOOD PRESSURE: 70 MMHG | SYSTOLIC BLOOD PRESSURE: 126 MMHG | WEIGHT: 254.8 LBS | HEART RATE: 60 BPM | HEIGHT: 70 IN | OXYGEN SATURATION: 97 %

## 2019-11-01 DIAGNOSIS — E78.2 MIXED HYPERLIPIDEMIA: ICD-10-CM

## 2019-11-01 DIAGNOSIS — I10 ESSENTIAL HYPERTENSION: ICD-10-CM

## 2019-11-01 DIAGNOSIS — G47.30 SLEEP APNEA, UNSPECIFIED TYPE: ICD-10-CM

## 2019-11-01 DIAGNOSIS — R00.1 SYMPTOMATIC BRADYCARDIA: ICD-10-CM

## 2019-11-01 DIAGNOSIS — I25.10 CORONARY ARTERY DISEASE INVOLVING NATIVE CORONARY ARTERY OF NATIVE HEART WITHOUT ANGINA PECTORIS: Primary | ICD-10-CM

## 2019-11-01 DIAGNOSIS — Z95.0 S/P PLACEMENT OF CARDIAC PACEMAKER: ICD-10-CM

## 2019-11-01 PROCEDURE — 99214 OFFICE O/P EST MOD 30 MIN: CPT | Performed by: INTERNAL MEDICINE

## 2019-11-01 RX ORDER — FUROSEMIDE 20 MG/1
20 TABLET ORAL 2 TIMES DAILY
Qty: 180 TABLET | Refills: 4 | Status: SHIPPED | OUTPATIENT
Start: 2019-11-01 | End: 2020-01-23

## 2019-11-21 PROBLEM — K57.92 DIVERTICULITIS: Status: ACTIVE | Noted: 2019-11-21

## 2020-01-07 ENCOUNTER — NURSE ONLY (OUTPATIENT)
Dept: CARDIOLOGY CLINIC | Age: 85
End: 2020-01-07
Payer: MEDICARE

## 2020-01-07 PROCEDURE — 93294 REM INTERROG EVL PM/LDLS PM: CPT | Performed by: INTERNAL MEDICINE

## 2020-01-07 PROCEDURE — 93296 REM INTERROG EVL PM/IDS: CPT | Performed by: INTERNAL MEDICINE

## 2020-01-07 NOTE — LETTER
[address] [place] [address] [phone]  [address] [place] [address] Road    Pacemaker/Defibrillator Clinic          01/07/20        [address] [facility] [address] [place] [address]        Dear Carrie Leon. This letter is to inform you that we received the transmission from your monitor at home that checks your implanted heart device. The next date your monitor will automatically transmit will be 4-8-20. If your report needs attention we will notify you. Your device and monitor are wireless and most transmit cellularly, but please periodically check your monitor is still plugged in to the electrical outlet. If you still use the telephone land line to send please ensure the connection to the phone stella is secure. This will help to ensure successful automatic transmissions in the future. Also, the monitor needs to be close to you while sleeping at night. Please be aware that the remote device transmission sites are periodically monitored only during regular business hours during which simultaneous in-office device clinics are being run. If your transmission requires attention, we will contact you as soon as possible. Thank you.             [facility]

## 2020-01-23 ENCOUNTER — OFFICE VISIT (OUTPATIENT)
Dept: CARDIOLOGY CLINIC | Age: 85
End: 2020-01-23
Payer: MEDICARE

## 2020-01-23 VITALS
SYSTOLIC BLOOD PRESSURE: 154 MMHG | HEART RATE: 60 BPM | RESPIRATION RATE: 20 BRPM | WEIGHT: 256 LBS | HEIGHT: 70 IN | DIASTOLIC BLOOD PRESSURE: 69 MMHG | BODY MASS INDEX: 36.65 KG/M2

## 2020-01-23 PROCEDURE — 99214 OFFICE O/P EST MOD 30 MIN: CPT | Performed by: INTERNAL MEDICINE

## 2020-01-23 RX ORDER — FUROSEMIDE 20 MG/1
TABLET ORAL
Qty: 180 TABLET | Refills: 4
Start: 2020-01-23 | End: 2020-09-29

## 2020-01-23 RX ORDER — AMLODIPINE BESYLATE 10 MG/1
10 TABLET ORAL NIGHTLY
Qty: 90 TABLET | Refills: 4 | Status: SHIPPED | OUTPATIENT
Start: 2020-01-23 | End: 2020-05-27

## 2020-01-23 RX ORDER — CARVEDILOL 25 MG/1
25 TABLET ORAL 2 TIMES DAILY
Qty: 180 TABLET | Refills: 4
Start: 2020-01-23 | End: 2020-02-12 | Stop reason: SDUPTHER

## 2020-01-23 RX ORDER — LISINOPRIL 10 MG/1
10 TABLET ORAL DAILY
Qty: 90 TABLET | Refills: 4 | Status: SHIPPED | OUTPATIENT
Start: 2020-01-23 | End: 2020-03-31

## 2020-01-23 RX ORDER — CLOPIDOGREL BISULFATE 75 MG/1
75 TABLET ORAL DAILY
Qty: 90 TABLET | Refills: 4 | Status: CANCELLED | OUTPATIENT
Start: 2020-01-23

## 2020-01-23 RX ORDER — PRAVASTATIN SODIUM 20 MG
20 TABLET ORAL DAILY
Qty: 90 TABLET | Refills: 4 | Status: SHIPPED | OUTPATIENT
Start: 2020-01-23 | End: 2020-02-04

## 2020-01-23 NOTE — PATIENT INSTRUCTIONS
Try to work on weight loss  Increase Coreg to 25 mg twice a day  Call Jay Bauman in 2 weeks to give her BP numbers  If BP goes too low can cut back down to 12.5 mg twice a day  Follow up with Dr Remedios Harry July 20th  Follow up with Dr Jaquan Delgado in Sept.

## 2020-01-23 NOTE — PROGRESS NOTES
[facility]  Cardiac Consult     Referring Provider:  Lexie Navas MD     Chief Complaint   Patient presents with    3 Month Follow-Up    Coronary Artery Disease    Congestive Heart Failure    Hypertension        History of Present Illness:  81 y/o male formally followed by Dr. Charley Underwood seen for f/u for CAD, s/p CABG, hyperlipidemia and uncontrolled HTN. He is doing well from a cardiac standpoint. No chest pain. Very inactive due to leg and knee pain. Hurts bilaterally from knees down walking. BP has been generally high with SBP in 150 range. No recurrent epistaxis. Past Medical History:   has a past medical history of Anxiety, Arthritis, CAD (coronary artery disease), Cancer ([address] GERD (gastroesophageal reflux disease), Hyperlipidemia, and Hypertension. Surgical History:   has a past surgical history that includes Appendectomy; Tonsillectomy; Knee arthroscopy; TURP; Coronary angioplasty with stent; Carpal tunnel release; TURP (12-); Cardiac surgery (2007); Prostate surgery; Mohs surgery; and pacemaker placement (10/04/2018). Social History:   reports that he quit smoking about 27 years ago. He has a 45.00 pack-year smoking history. He has never used smokeless tobacco. He reports current alcohol use. He reports that he does not use drugs. Family History:  family history includes Cancer in his father; Diabetes in his father; Heart Disease in his mother. Allergies:  Penicillins     Home Medications:  Prior to Visit Medications    Medication Sig Taking?  Authorizing Provider   furosemide (LASIX) 20 MG tablet Take 1 tablet by mouth 2 times daily Yes Sarita Espino MD   amLODIPine (NORVASC) 10 MG tablet Take 1 tablet by mouth nightly Yes Sarita Espino MD   carvedilol (COREG) 12.5 MG tablet Take 1 tablet by mouth 2 times daily Yes Sarita Espino MD   clopidogrel (PLAVIX) 75 MG tablet Take 1 tablet by mouth daily Yes Sarita Espino MD   pravastatin (PRAVACHOL) 20 MG tablet Take 1 tablet by mouth daily Yes Yuliana Mandujano MD   lisinopril (PRINIVIL;ZESTRIL) 10 MG tablet Take 1 tablet by mouth daily Yes Yuliana Mandujano MD   aspirin 81 MG tablet Take 81 mg by mouth nightly  Yes Historical Provider, MD   ranitidine (ZANTAC) 300 MG tablet Take 300 mg by mouth daily  Yes Historical Provider, MD   glimepiride (AMARYL) 2 MG tablet Take 2 mg by mouth every morning (before breakfast) Yes Historical Provider, MD   alprazolam (XANAX) 0.25 MG tablet Take 0.25 mg by mouth 3 times daily as needed. TAKES 1/2 TAB DAILY OR TWO TIMES A DAY AS NEEDED Yes Historical Provider, MD       [x] Medications and dosages reviewed. ROS:  [x]Full ROS obtained and negative except as mentioned in HPI      Physical Examination:    Vitals:    01/23/20 1403   BP: (!) 154/69   Pulse: 60   Resp:       BP (!) 154/69 (Site: Right Upper Arm, Position: Sitting, Cuff Size: Large Adult)   Pulse 60   Resp 20   Ht 5' 10\" (1.778 m)   Wt 256 lb (116.1 kg)   BMI 36.73 kg/m²     · GENERAL: overweight male in NAD  · NEUROLOGICAL: Alert and oriented, no motor abnormalities  · PSYCH: Calm affect  · SKIN: Warm and dry, No rash  · HEENT: Normocephalic, Sclera non-icteric, mucus membranes moist  · NECK: supple, JVP normal  · CAROTID: Normal upstroke, no bruits  · CARDIAC: Normal PMI, regular rate and rhythm, normal S1S2, NO murmur, rub, or gallop  · RESPIRATORY: Normal respiratory effort, clear to auscultation bilaterally  · EXTREMITIES: trace LE edema  · MUSCULOSKELETAL: No joint swelling or tenderness, no chest wall tenderness  · GASTROINTESTINAL: normal bowel sounds, soft, non-tender, no bruit      Assessment:       HTN:  BP (!) 154/69 (Site: Right Upper Arm, Position: Sitting, Cuff Size: Large Adult)   Pulse 60   Resp 20   Ht 5' 10\" (1.778 m)   Wt 256 lb (116.1 kg)   BMI 36.73 kg/m²   High  Increase coreg to 25 BID. Call in a few weeks to tell us how it is doing    CAD:  Remains stable without angina. continue current

## 2020-02-12 ENCOUNTER — TELEPHONE (OUTPATIENT)
Dept: CARDIOLOGY CLINIC | Age: 85
End: 2020-02-12

## 2020-02-12 RX ORDER — CARVEDILOL 25 MG/1
25 TABLET ORAL 2 TIMES DAILY
Qty: 180 TABLET | Refills: 4 | Status: SHIPPED | OUTPATIENT
Start: 2020-02-12 | End: 2020-09-29

## 2020-02-12 NOTE — TELEPHONE ENCOUNTER
Wife calling to let Dylon Faulkner know that increasing the Coreg to 12.5 mg  2x daily has helped, she is asking that a script be sent to pharmacy for 25 mg of this medication per Soco's instructions to call if this was needed. Please call to advise. Thank you.

## 2020-03-31 RX ORDER — LISINOPRIL 10 MG/1
TABLET ORAL
Qty: 90 TABLET | Refills: 3 | Status: SHIPPED | OUTPATIENT
Start: 2020-03-31 | End: 2021-02-02 | Stop reason: ALTCHOICE

## 2020-04-08 ENCOUNTER — NURSE ONLY (OUTPATIENT)
Dept: CARDIOLOGY CLINIC | Age: 85
End: 2020-04-08
Payer: MEDICARE

## 2020-04-08 PROCEDURE — 93296 REM INTERROG EVL PM/IDS: CPT | Performed by: INTERNAL MEDICINE

## 2020-04-08 PROCEDURE — 93294 REM INTERROG EVL PM/LDLS PM: CPT | Performed by: INTERNAL MEDICINE

## 2020-04-08 NOTE — LETTER
4[address] [facility] [phone]  [address] [phone]    Pacemaker/Defibrillator Clinic          04/08/20        76 [facility] [address] [place] [address]        Dear Lesa Ortega. This letter is to inform you that we received the transmission from your monitor at home that checks your implanted heart device. The next date your monitor will automatically transmit will be 7-8-20. If your report needs attention we will notify you. Your device and monitor are wireless and most transmit cellularly, but please periodically check your monitor is still plugged in to the electrical outlet. If you still use the telephone land line to send please ensure the connection to the phone stella is secure. This will help to ensure successful automatic transmissions in the future. Also, the monitor needs to be close to you while sleeping at night. Please be aware that the remote device transmission sites are periodically monitored only during regular business hours during which simultaneous in-office device clinics are being run. If your transmission requires attention, we will contact you as soon as possible. Thank you.             [address]

## 2020-05-27 RX ORDER — AMLODIPINE BESYLATE 10 MG/1
TABLET ORAL
Qty: 90 TABLET | Refills: 3 | Status: SHIPPED | OUTPATIENT
Start: 2020-05-27 | End: 2021-02-19 | Stop reason: SDUPTHER

## 2020-07-08 ENCOUNTER — NURSE ONLY (OUTPATIENT)
Dept: CARDIOLOGY CLINIC | Age: 85
End: 2020-07-08
Payer: MEDICARE

## 2020-07-08 PROCEDURE — 93296 REM INTERROG EVL PM/IDS: CPT | Performed by: INTERNAL MEDICINE

## 2020-07-08 PROCEDURE — 93294 REM INTERROG EVL PM/LDLS PM: CPT | Performed by: INTERNAL MEDICINE

## 2020-07-08 NOTE — LETTER
[address] [facility] [phone]  [address]    Pacemaker/Defibrillator Clinic          07/08/20        76 [facility] [address] [place] [address]        Dear Yris Li. This letter is to inform you that we received the transmission from your monitor at home that checks your implanted heart device. The next date your monitor will automatically transmit will be 10-12-20. If your report needs attention we will notify you. Your device and monitor are wireless and most transmit cellularly, but please periodically check your monitor is still plugged in to the electrical outlet. If you still use the telephone land line to send please ensure the connection to the phone stella is secure. This will help to ensure successful automatic transmissions in the future. Also, the monitor needs to be close to you while sleeping at night. Please be aware that the remote device transmission sites are periodically monitored only during regular business hours during which simultaneous in-office device clinics are being run. If your transmission requires attention, we will contact you as soon as possible. Thank you.             [facility]

## 2020-07-24 RX ORDER — PRAVASTATIN SODIUM 20 MG
20 TABLET ORAL DAILY
COMMUNITY
End: 2021-01-28

## 2020-09-29 ENCOUNTER — OFFICE VISIT (OUTPATIENT)
Dept: CARDIOLOGY CLINIC | Age: 85
End: 2020-09-29
Payer: MEDICARE

## 2020-09-29 VITALS
BODY MASS INDEX: 36.16 KG/M2 | DIASTOLIC BLOOD PRESSURE: 68 MMHG | SYSTOLIC BLOOD PRESSURE: 134 MMHG | WEIGHT: 252.6 LBS | HEART RATE: 60 BPM | HEIGHT: 70 IN

## 2020-09-29 PROCEDURE — 99214 OFFICE O/P EST MOD 30 MIN: CPT | Performed by: INTERNAL MEDICINE

## 2020-09-29 RX ORDER — CARVEDILOL 25 MG/1
TABLET ORAL
Qty: 135 TABLET | Refills: 3
Start: 2020-09-29 | End: 2021-02-15

## 2020-09-29 RX ORDER — FUROSEMIDE 20 MG/1
TABLET ORAL
Qty: 135 TABLET | Refills: 3
Start: 2020-09-29 | End: 2021-02-15

## 2020-09-29 NOTE — PROGRESS NOTES
[address]  Cardiac Consult     Referring Provider:  Pete Mcmullen MD     Chief Complaint   Patient presents with    Coronary Artery Disease    Hypertension    Leg Pain        History of Present Illness:  81 y/o male formally followed by Dr. Marisela Pollard seen for f/u for CAD, s/p CABG, hyperlipidemia and uncontrolled HTN. He is doing well. Remains inactive due to knee pain. Complains of fatigue. No chest pain or dyspnea with exertion. Last visit coreg was increased to 25 BID. He was having some hypotension during the day. He decreased it to 12.5/25 after seeing PCP and feels better. He has remote pacer checks q 3 months    Past Medical History:   has a past medical history of Anxiety, Arthritis, CAD (coronary artery disease), Cancer ([facility] [address] GERD (gastroesophageal reflux disease), Hyperlipidemia, and Hypertension. Surgical History:   has a past surgical history that includes Appendectomy; Tonsillectomy; Knee arthroscopy; TURP; Coronary angioplasty with stent; Carpal tunnel release; TURP (12-); Cardiac surgery (2007); Prostate surgery; Mohs surgery; and pacemaker placement (10/04/2018). Social History:   reports that he quit smoking about 28 years ago. He has a 45.00 pack-year smoking history. He has never used smokeless tobacco. He reports current alcohol use. He reports that he does not use drugs. Family History:  family history includes Cancer in his father; Diabetes in his father; Heart Disease in his mother. Allergies:  Penicillins     Home Medications:  Prior to Visit Medications    Medication Sig Taking?  Authorizing Provider   pravastatin (PRAVACHOL) 20 MG tablet Take 20 mg by mouth daily Yes Historical Provider, MD   amLODIPine (NORVASC) 10 MG tablet TAKE 1 TABLET BY MOUTH EVERY DAY AT NIGHT Yes Sarah Valero MD   lisinopril (PRINIVIL;ZESTRIL) 10 MG tablet TAKE 1 TABLET BY MOUTH EVERY DAY Yes Sarah Valero MD   carvedilol (COREG) 25 MG tablet Take 1 tablet by mouth 2 times daily  Patient taking differently: Take 25 mg by mouth 12.5 in am 25mg in pm Yes Bernice Hodges MD   glimepiride (AMARYL) 2 MG tablet Take 2 mg by mouth every morning (before breakfast) Yes Historical Provider, MD   furosemide (LASIX) 20 MG tablet Take 1 tab every am and 1 tab every other pm  Patient taking differently: Take 20 mg by mouth daily Take 1 tab every am and 1 tab every other pm Yes Bernice Hodges MD   aspirin 81 MG tablet Take 81 mg by mouth nightly  Yes Historical Provider, MD   alprazolam (XANAX) 0.25 MG tablet Take 0.25 mg by mouth 3 times daily as needed. TAKES 1/2 TAB DAILY OR TWO TIMES A DAY AS NEEDED Yes Historical Provider, MD   tamsulosin (FLOMAX) 0.4 MG capsule Take 1 capsule by mouth daily  Araceli Anderson MD   vitamin D (ERGOCALCIFEROL) 1.25 MG (71373 UT) CAPS capsule TAKE 1 CAPSULE BY MOUTH ONE TIME PER Refugraimundo Gomes MD       [x] Medications and dosages reviewed.     ROS:  [x]Full ROS obtained and negative except as mentioned in HPI      Physical Examination:    Vitals:    09/29/20 1033   BP: 134/68   Pulse: 60      /68 (Site: Left Upper Arm, Position: Sitting, Cuff Size: Medium Adult)   Pulse 60   Ht 5' 10\" (1.778 m)   Wt 252 lb 9.6 oz (114.6 kg)   BMI 36.24 kg/m²     · GENERAL: overweight male in NAD  · NEUROLOGICAL: Alert and oriented, no motor abnormalities  · PSYCH: Calm affect  · SKIN: Warm and dry, No rash  · HEENT: Normocephalic, Sclera non-icteric, mucus membranes moist  · NECK: supple, JVP normal  · CAROTID: Normal upstroke, no bruits  · CARDIAC: Normal PMI, regular rate and rhythm, normal S1S2, no murmur, rub, or gallop  · RESPIRATORY: Normal respiratory effort, clear to auscultation bilaterally  · EXTREMITIES: No LE edema  · MUSCULOSKELETAL: No joint swelling or tenderness, no chest wall tenderness  · GASTROINTESTINAL: normal bowel sounds, soft, non-tender, no bruit      Assessment:       HTN:  /68 (Site: Left Upper Arm, Position: Sitting, Cuff Size: Medium Adult)   Pulse 60   Ht 5' 10\" (1.778 m)   Wt 252 lb 9.6 oz (114.6 kg)   BMI 36.24 kg/m²   Controlled, Continue current therapy    CAD:  Remains stable without angina. Continue current medical therapy  Myoview 8/2018 fixed inferior scar vs attenuation. S/P CABG 2007  Negative GXT [place] 2015  Normal LV function 2015 as well on ECHO  myoview 2012 \"inferior wall attenuation\"      Hyperlipidemia:  Excellent control on Pravastatin. LDL=41. Continue same treatment    Sleep Apnea: On CPAP. Controlled. Continue. Bradycardia:  Resolved s/p pacer. Regular checks. Stable. Claudication:  PCI left leg  He has f/u Dr. Jm Piña 7/2020. Seems stable. Current symptoms sound more c/w knee issues    Epistaxis  Resolved off of plavix    CRI:  Creat 1.53   Stable.  Continue renal f/u    Plan:  Stable cardiac status  Try to increase activity  F/u me 1 yr    Thank you for allowing me to participate in the care of this individual.      Christina Kearns M.D., [facility]

## 2020-10-12 ENCOUNTER — NURSE ONLY (OUTPATIENT)
Dept: CARDIOLOGY CLINIC | Age: 85
End: 2020-10-12
Payer: MEDICARE

## 2020-10-12 PROCEDURE — 93296 REM INTERROG EVL PM/IDS: CPT | Performed by: INTERNAL MEDICINE

## 2020-10-12 PROCEDURE — 93294 REM INTERROG EVL PM/LDLS PM: CPT | Performed by: INTERNAL MEDICINE

## 2020-10-12 NOTE — LETTER
[address] [facility] [phone] [address] [facility] [phone]    Pacemaker/Defibrillator Clinic          10/14/20        [address] [facility] [address] [facility] [address]        Dear Yanira Gan. This letter is to inform you that we received the transmission from your monitor at home that checks your implanted heart device. The next date your monitor will automatically transmit will be 1-12-21. If your report needs attention we will notify you. Your device and monitor are wireless and most transmit cellularly, but please periodically check your monitor is still plugged in to the electrical outlet. If you still use the telephone land line to send please ensure the connection to the phone stella is secure. This will help to ensure successful automatic transmissions in the future. Also, the monitor needs to be close to you while sleeping at night. Please be aware that the remote device transmission sites are periodically monitored only during regular business hours during which simultaneous in-office device clinics are being run. If your transmission requires attention, we will contact you as soon as possible. Thank you.             [address]

## 2020-10-14 NOTE — PROGRESS NOTES
We received remote transmission from patient's monitor at home. Transmission shows normal sensing and pacing function. Noted NSVT. Pt is on Coreg. EP physician will review. See interrogation under cardiology tab in the [address] [facility] [address] field for more details.

## 2020-11-03 PROBLEM — I73.9 PVD (PERIPHERAL VASCULAR DISEASE) (HCC): Status: RESOLVED | Noted: 2018-08-20 | Resolved: 2020-11-03

## 2021-01-11 PROCEDURE — 93294 REM INTERROG EVL PM/LDLS PM: CPT | Performed by: INTERNAL MEDICINE

## 2021-01-11 PROCEDURE — 93296 REM INTERROG EVL PM/IDS: CPT | Performed by: INTERNAL MEDICINE

## 2021-01-12 ENCOUNTER — NURSE ONLY (OUTPATIENT)
Dept: CARDIOLOGY CLINIC | Age: 86
End: 2021-01-12
Payer: MEDICARE

## 2021-01-12 DIAGNOSIS — Z95.0 PACEMAKER: ICD-10-CM

## 2021-01-12 DIAGNOSIS — I49.5 TACHY-BRADY SYNDROME (HCC): ICD-10-CM

## 2021-01-12 NOTE — PROGRESS NOTES
We received remote transmission from patient's monitor at home. Transmission shows normal sensing and pacing function. EP physician will review. See interrogation under cardiology tab in the [address] [facility] [address] field for more details.

## 2021-01-12 NOTE — LETTER
[address] [facility] [phone]  [address] [facility] [phone]    Pacemaker/Defibrillator Clinic          01/12/21        [address] [facility] [address] [place] [address]        Dear Gagan Gilbert. This letter is to inform you that we received the transmission from your monitor at home that checks your implanted heart device. The next date your monitor will automatically transmit will be 4-14-21. If your report needs attention we will notify you. Your device and monitor are wireless and most transmit cellularly, but please periodically check your monitor is still plugged in to the electrical outlet. If you still use the telephone land line to send please ensure the connection to the phone stella is secure. This will help to ensure successful automatic transmissions in the future. Also, the monitor needs to be close to you while sleeping at night. Please be aware that the remote device transmission sites are periodically monitored only during regular business hours during which simultaneous in-office device clinics are being run. If your transmission requires attention, we will contact you as soon as possible. Thank you.             [address]

## 2021-01-21 ENCOUNTER — TELEPHONE (OUTPATIENT)
Dept: CARDIOLOGY CLINIC | Age: 86
End: 2021-01-21

## 2021-01-21 DIAGNOSIS — I25.83 CORONARY ARTERY DISEASE DUE TO LIPID RICH PLAQUE: ICD-10-CM

## 2021-01-21 DIAGNOSIS — I25.10 CORONARY ARTERY DISEASE DUE TO LIPID RICH PLAQUE: ICD-10-CM

## 2021-01-21 DIAGNOSIS — I47.29 NSVT (NONSUSTAINED VENTRICULAR TACHYCARDIA): Primary | ICD-10-CM

## 2021-01-21 NOTE — TELEPHONE ENCOUNTER
~Called the PCP and left message on machine for them to call us back about getting the monitor results faxed over to us.

## 2021-01-21 NOTE — TELEPHONE ENCOUNTER
PCP had this patient wear a heart monitor for 1 week (got it at [facility]) and he turned it back in on 1/20/21. PCP told patient to make appt with [facility] because of some abnormalities he saw on the monitor results. Could [facility] get the results and review them then let this patient know when he needs an appt ? Offered first available appt with [facility] in [place] or [place] in mid feb but wife wants him seen before that .

## 2021-01-21 NOTE — TELEPHONE ENCOUNTER
~PCP office called back and the monitor report will be faxed over and placed in [address] [place] [address] for review.

## 2021-01-22 NOTE — TELEPHONE ENCOUNTER
Spoke to patient's wife. Discussed monitor and plan. Will call back Monday after getting patient's tests and appts scheduled.

## 2021-01-25 ENCOUNTER — TELEPHONE (OUTPATIENT)
Dept: CARDIOLOGY CLINIC | Age: 86
End: 2021-01-25

## 2021-01-25 NOTE — TELEPHONE ENCOUNTER
Patient's stress and echo are scheduled for Friday at 8 am. Reviewed instructions with pt wife. [facility] wants pt to try to walk on treadmill but if needed can switch to [facility]. Instructed to hold Coreg morning of test per [facility].

## 2021-01-25 NOTE — TELEPHONE ENCOUNTER
Dr Jovan Crawford would like patient to see EP Dr Agustin Fish for 18 bts VT seen on monitor that PCP ordered. PT has stress and echo ordered 1/29/21.  Pt known to [facility] (had PPM placed 2019)

## 2021-01-26 NOTE — TELEPHONE ENCOUNTER
LM for pt to call to [facility] RM ov on 02/02 1:30pm to discuss monitor results per [facility]

## 2021-01-26 NOTE — PROGRESS NOTES
[address]   Electrophysiology Follow up   Date: 2/2/2021  I had the privilege of visiting [facility]. in the office. CC: \" lightheadedness\"  HPI: [facility]. is a 80 y.o. male with pmh CAD s/p CABG 2007, history of PCI, HTN,  HLD, HFpEF, CKD and symptomatic bradycardia. 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 Had a well care visit 01/07/2021 and complained of episodes of significant lightheadedness. Holter monitor was ordered, which showed NSVT. States that he had some ear infection and he was on antibiotics and since then he has not had any dizziness. Denies having any faint or syncope. Denies having chest pain. He has obstructive sleep apnea is on CPAP. Assessment and plan:     - NSVT:    Nonsustained ventricular tachycardia noted on Holter monitor. Device interrogation also shows nonsustained ventricular tachycardia. Patient has been asymptomatic during these episodes of nonsustained ventricular tachycardia. NSVT is not the cause of his symptoms. He reports dizziness and lightheadedness while in sinus rhythm. He also reports that his dizziness and lightheadedness has been resolved since treatment of his ear infection. He has extensive history of coronary artery disease, status post bypass in 2007 and has had PCI in the past.  Recommend coronary angiography. Will refer back to cardiology for consideration of coronary angiography. Also recommend aggressive medical therapy. He is on carvedilol. Up titrate as BP tolerates. 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 All findings and changes are in device interrogation sheet and reflect my personal interpretation and changes and is scanned to Epic.    AP 5.9,  77.6,JEYSON  11.4    HTN  Vitals:    02/02/21 1341   BP: (!) 160/80   Pulse: 64   SpO2: 96%      - ? White coat HTN. -Home BP monitoring encourage with a BP goal <130/80   Follows with PCP Marisol Danielle at [address] 25 mg BID    Coreg 12.5 mg in the a.m. and 25mg in the PM   Lasix 20 mg daily   Norvasc [id number] mg    Lisinopril 5 mg daily    Hyperlipidemia   Pravastatin 20 mg daily   Lipids 12/07/2020 Care Everywhere , HDL 45, LDL 69 ,     Coronary Artery Disease    S/p CABG 2007   Follows with Dr. Jeremi Granger     Recommend angiography and referred back to IC/cardiology for evaluation. Patient Active Problem List    Diagnosis Date Noted    BPH (benign prostatic hyperplasia) 12/20/2010     Priority: High    Diverticulitis 11/21/2019    CKD (chronic kidney disease), stage III 10/14/2019    Proteinuria 10/14/2019    Obesity, unspecified obesity severity, unspecified obesity type 10/14/2019    S/P placement of cardiac pacemaker 10/11/2018    Pacemaker 10/10/2018    Tachy-monroe syndrome ([address]     Sleep apnea 08/02/2018    Symptomatic bradycardia 08/02/2018    Claudication of both lower extremities ([address] 05/25/2018    Chronic diastolic congestive heart failure ([address] 03/15/2018    Asymptomatic bilateral carotid artery stenosis 06/27/2017    Gastroesophageal reflux disease without esophagitis 06/27/2017    Generalized anxiety disorder 06/27/2017    History of colonic polyps 06/27/2017    History of coronary artery bypass graft 06/27/2017    History of coronary artery stent placement 06/27/2017    Type 2 diabetes mellitus with complication, without long-term current use of insulin ([address] 06/27/2017    Hyperlipidemia 10/06/2015    Edema 04/29/2015    S/P total knee arthroplasty 12/03/2014    CAD (coronary artery disease) 12/20/2010    HTN (hypertension) 12/20/2010    OA (osteoarthritis) 12/20/2010     Diagnostic studies:   EKG 2/2/21  Sinus Rhythm    ECHO 01/29/2021  Summary   -Technically difficult examination.   -Definity was administered. -Suboptimal image quality.   -Normal left ventricle size, wall thickness, and systolic function with an   estimated ejection fraction of 55%.   -No regional wall motion abnormalities are seen. -E/e=7.6.   -Trivial tricuspid regurgitation. PASP of 28 mmHg    NM stress 01/29/2021     Summary    Normal myocardial perfusion.    Normal LV size and systolic function.    [place]=81%. MCOT 01/12/2021 to 01/18/2021- predominant rhythm rhythm is sinus rhyth with average HR 63. Rate Range  bpm. VT occurred 01/12/21 with rate of 179 BPM for 18 beats. Heart bloc occurred 2 ties, the most severe 1 degree: slowest 65 BPM PACE burden 1%, PVC [place] 7%. Carotid Duplex  01/12/2021  Conclusions:    Estimated diameter reduction of the right internal carotid artery is    50-69%.    Estimated diameter reduction of the left internal carotid artery is 50    -69%.     Antegrade right vertebral artery flow. Antegrade left vertebral artery    flow.    Degree of stenosis derived using validated velocity measurements    correlated with NASCET validated angiographic criteria.    Approved By: Dr. Jason Worthy MD    Approved at: January 12, 2021 11:35 AM EST        I independently reviewed the cardiac diagnostic studies, ECG and relevant imaging studies. Lab Results   Component Value Date    LVEF 64 08/20/2018     Lab Results   Component Value Date    TSH 1.64 04/28/2014         Physical Examination:  Vitals:    02/02/21 1341   BP: (!) 160/80   Pulse: 64   SpO2: 96%      Wt Readings from Last 3 Encounters:   02/02/21 264 lb 12.8 oz (120.1 kg)   12/01/20 261 lb (118.4 kg)   10/06/20 257 lb (116.6 kg)       · Constitutional: Oriented. No distress. · Head: Normocephalic and atraumatic. · Mouth/Throat: Oropharynx is clear and moist.   · Eyes: Conjunctivae normal. EOM are normal.   · Neck: Neck supple. No JVD present. · Cardiovascular: Normal rate, regular rhythm, S1&S2.     · Pulmonary/Chest: Bilateral respiratory sounds. No rhonchi. · Abdominal: Soft. No tenderness. · Musculoskeletal: No tenderness. No edema    · Lymphadenopathy: Has no cervical adenopathy. · Neurological: Alert and oriented. Follows command, No Gross deficit   · Skin: Skin is warm, No rash noted. · Psychiatric: Has a normal behavior       Review of System:  [x] Full ROS obtained and negative except as mentioned in HPI    Prior to Admission medications    Medication Sig Start Date End Date Taking? Authorizing Provider   vitamin D (ERGOCALCIFEROL) 1.25 MG (26301 UT) CAPS capsule TAKE 1 CAPSULE BY MOUTH ONE TIME PER WEEK 1/29/21  Yes Vinny Rodriguez MD   pravastatin (PRAVACHOL) 20 MG tablet TAKE 1 TABLET BY MOUTH DAILY 1/28/21  Yes Keon Flanagan MD   hydrALAZINE (APRESOLINE) 25 MG tablet TAKE 1 TABLET BY MOUTH TWICE A DAY 8/15/29  Yes REAL Gleason - CNP   tamsulosin (FLOMAX) 0.4 MG capsule TAKE 1 CAPSULE BY MOUTH EVERY DAY 11/12/20  Yes Historical Provider, MD   carvedilol (COREG) 25 MG tablet Take 12.5mg in am, 25mg in pm 9/29/20  Yes Keon Flanagan MD   furosemide (LASIX) 20 MG tablet Take one tablet every morning and take second tablet every other evening. 9/29/20  Yes Keon Flanagan MD   amLODIPine (NORVASC) 10 MG tablet TAKE 1 TABLET BY MOUTH EVERY DAY AT NIGHT 5/27/20  Yes Keon Flanagan MD   glimepiride (AMARYL) 2 MG tablet Take 2 mg by mouth every morning (before breakfast)   Yes Historical Provider, MD   aspirin 81 MG tablet Take 81 mg by mouth nightly    Yes Historical Provider, MD   alprazolam (XANAX) 0.25 MG tablet Take 0.25 mg by mouth 3 times daily as needed. TAKES 1/2 TAB DAILY OR TWO TIMES A DAY AS NEEDED 3/29/10  Yes Historical Provider, MD       Allergies   Allergen Reactions    Penicillins Rash     Other reaction(s): Unknown  Tolerates now       Social History:  Reviewed. reports that he quit smoking about 28 years ago. He has a 45.00 pack-year smoking history.  He has never used smokeless tobacco. He reports current alcohol use. He reports that he does not use drugs. Family History:  Reviewed. Reviewed. No family history of SCD. Relevant and available labs, and cardiovascular diagnostics reviewed. Reviewed. I independently reviewed relevant and available cardiac diagnostic tests ECG, CXR, Echo, Stress test, Device interrogation, Holter, CT scan. Outside medical records via Care everywhere reviewed and summarized in H&P above. Complex medical condition with multiple medical problems affecting prognosis and outcome of EP interventions      All questions and concerns were addressed to the patient/family. Alternatives to my treatment were discussed. I have discussed the above stated plan and the patient verbalized understanding and agreed with the plan. Physician Attestation: I, Dr. Amy Matos, confirm that the scribe's documentation has been prepared under my direction and personally reviewed by me in its entirety. I also confirm that the note above accurately reflects all work, treatment, procedures, and medical decision making performed by me. NOTE: This report was transcribed using voice recognition software. Every effort was made to ensure accuracy, however, inadvertent computerized transcription errors may be present.      Amy Matos MD, MPH  [place] [address]   Office: [phone]  Fax: [phone]

## 2021-01-28 RX ORDER — PRAVASTATIN SODIUM 20 MG
TABLET ORAL
Qty: 90 TABLET | Refills: 4 | Status: SHIPPED | OUTPATIENT
Start: 2021-01-28 | End: 2021-02-19 | Stop reason: ALTCHOICE

## 2021-01-29 ENCOUNTER — HOSPITAL ENCOUNTER (OUTPATIENT)
Dept: NON INVASIVE DIAGNOSTICS | Age: 86
Discharge: HOME | End: 2021-01-29
Payer: MEDICARE

## 2021-01-29 DIAGNOSIS — I47.29 NSVT (NONSUSTAINED VENTRICULAR TACHYCARDIA): ICD-10-CM

## 2021-01-29 DIAGNOSIS — I25.10 CORONARY ARTERY DISEASE DUE TO LIPID RICH PLAQUE: ICD-10-CM

## 2021-01-29 DIAGNOSIS — I25.83 CORONARY ARTERY DISEASE DUE TO LIPID RICH PLAQUE: ICD-10-CM

## 2021-01-29 LAB
LV EF: 55 %
LV EF: 67 %
LVEF MODALITY: NORMAL
LVEF MODALITY: NORMAL

## 2021-01-29 PROCEDURE — A9502 TC99M TETROFOSMIN: HCPCS | Performed by: FAMILY MEDICINE

## 2021-01-29 PROCEDURE — 6360000002 HC RX W HCPCS: Performed by: INTERNAL MEDICINE

## 2021-01-29 PROCEDURE — 93017 CV STRESS TEST TRACING ONLY: CPT | Performed by: INTERNAL MEDICINE

## 2021-01-29 PROCEDURE — 3430000000 HC RX DIAGNOSTIC RADIOPHARMACEUTICAL: Performed by: FAMILY MEDICINE

## 2021-01-29 PROCEDURE — 6360000004 HC RX CONTRAST MEDICATION: Performed by: INTERNAL MEDICINE

## 2021-01-29 PROCEDURE — 78452 HT MUSCLE IMAGE SPECT MULT: CPT | Performed by: INTERNAL MEDICINE

## 2021-01-29 PROCEDURE — C8923 2D TTE W OR W/O FOL W/CON,CO: HCPCS

## 2021-01-29 PROCEDURE — 93306 TTE W/DOPPLER COMPLETE: CPT

## 2021-01-29 RX ADMIN — PERFLUTREN 1.65 MG: 6.52 INJECTION, SUSPENSION INTRAVENOUS at 09:01

## 2021-01-29 RX ADMIN — REGADENOSON 0.4 MG: 0.08 INJECTION, SOLUTION INTRAVENOUS at 10:32

## 2021-01-29 RX ADMIN — TETROFOSMIN 10 MILLICURIE: 1.38 INJECTION, POWDER, LYOPHILIZED, FOR SOLUTION INTRAVENOUS at 09:30

## 2021-01-29 RX ADMIN — TETROFOSMIN 30 MILLICURIE: 1.38 INJECTION, POWDER, LYOPHILIZED, FOR SOLUTION INTRAVENOUS at 11:05

## 2021-01-29 NOTE — PROGRESS NOTES
Pt states to feel off balbance and have unsteady gait. States to have hx of felling like 'I'm Dizzy\". States unable to walk for any length of time. Will do Lexiscan per protocol. Instructed on Lexiscan Stress Test Procedure including possible side effects/ adverse reactions. Patient verbalizes  understanding and denies having any questions . See [address] [place] [address] Rd Cardiology

## 2021-02-02 ENCOUNTER — NURSE ONLY (OUTPATIENT)
Dept: CARDIOLOGY CLINIC | Age: 86
End: 2021-02-02
Payer: MEDICARE

## 2021-02-02 ENCOUNTER — OFFICE VISIT (OUTPATIENT)
Dept: CARDIOLOGY CLINIC | Age: 86
End: 2021-02-02
Payer: MEDICARE

## 2021-02-02 VITALS
OXYGEN SATURATION: 96 % | HEIGHT: 70 IN | SYSTOLIC BLOOD PRESSURE: 160 MMHG | BODY MASS INDEX: 37.91 KG/M2 | WEIGHT: 264.8 LBS | DIASTOLIC BLOOD PRESSURE: 80 MMHG | HEART RATE: 64 BPM

## 2021-02-02 DIAGNOSIS — R00.1 SYMPTOMATIC BRADYCARDIA: ICD-10-CM

## 2021-02-02 DIAGNOSIS — I49.5 TACHY-BRADY SYNDROME (HCC): ICD-10-CM

## 2021-02-02 DIAGNOSIS — Z95.0 PACEMAKER: ICD-10-CM

## 2021-02-02 DIAGNOSIS — E78.2 MIXED HYPERLIPIDEMIA: ICD-10-CM

## 2021-02-02 DIAGNOSIS — I10 ESSENTIAL HYPERTENSION: ICD-10-CM

## 2021-02-02 DIAGNOSIS — I25.10 CORONARY ARTERY DISEASE INVOLVING NATIVE CORONARY ARTERY OF NATIVE HEART WITHOUT ANGINA PECTORIS: Primary | ICD-10-CM

## 2021-02-02 PROCEDURE — 93000 ELECTROCARDIOGRAM COMPLETE: CPT | Performed by: INTERNAL MEDICINE

## 2021-02-02 PROCEDURE — 93280 PM DEVICE PROGR EVAL DUAL: CPT | Performed by: INTERNAL MEDICINE

## 2021-02-02 PROCEDURE — 99214 OFFICE O/P EST MOD 30 MIN: CPT | Performed by: INTERNAL MEDICINE

## 2021-02-03 NOTE — PROGRESS NOTES
Patient comes in for programming evaluation for his pacemaker. All sensing and pacing parameters are within normal range. Battery life 11.4 years   AP 77.6%.  5.9%. 8 NSVT episodes noted. Last on 1/12/2021, longest 4 seconds with a Mx V rate of 79/190 bpm.   PVC Runs (2-4 beats) 1.2 per hour   PVC Singles 40.4 per hour  Patient remains on Coreg. No changes need to be made at this time. Please see interrogation for more detail. Patient will see Dr. Ash Romo today and follow up in 3 months in office or remotely.

## 2021-02-13 DIAGNOSIS — I10 ESSENTIAL HYPERTENSION: ICD-10-CM

## 2021-02-13 DIAGNOSIS — I25.10 CORONARY ARTERY DISEASE INVOLVING NATIVE CORONARY ARTERY OF NATIVE HEART WITHOUT ANGINA PECTORIS: ICD-10-CM

## 2021-02-15 RX ORDER — FUROSEMIDE 20 MG/1
TABLET ORAL
Qty: 180 TABLET | Refills: 3 | Status: SHIPPED | OUTPATIENT
Start: 2021-02-15 | End: 2021-09-03 | Stop reason: SDUPTHER

## 2021-02-15 RX ORDER — CARVEDILOL 25 MG/1
TABLET ORAL
Qty: 180 TABLET | Refills: 3 | Status: SHIPPED | OUTPATIENT
Start: 2021-02-15 | End: 2021-09-03 | Stop reason: SDUPTHER

## 2021-02-16 ENCOUNTER — TELEPHONE (OUTPATIENT)
Dept: CARDIOLOGY CLINIC | Age: 86
End: 2021-02-16

## 2021-02-16 NOTE — TELEPHONE ENCOUNTER
Discussed with Dr. Sofi Casey. Pt asymptomatic. He has about 2 episodes of asymptomatic NSVT per year. At age 80 I a hesitant to perform invasive testing unless truly needed. Dr. Sofi Casey in agreement. I wll see pt in office and see how aggressive he wishes to be. I would lean toward following but will cath if pt desires.     Nusrat Aguilar MD    Type ATP  Seq Success ID# Date Time  hh:mm  Duration  hh:mm:ss  Avg bpm  A/V  Max bpm  A/V  Activity at  Onset  VT-NS 9 12-Jan-2021 20:42 :04 65/197 Rest  ---------------------- Last [employer] Monitor Session 12-Jan-2021 ----------------------------------------------------------------------------  VT-NS 8 28-Aug-2020 20:05 :01 75/171 Rest  VT-NS 7 06-Mar-2020 04:46 :02 53/165 Rest  VT-NS 6 17-Feb-2020 09:33 :01 ---/211 Rest  VT-NS 5 29-Apr-2019 11:28 :01 76/169 Active  VT-NS 4 05-Mar-2019 20:19 :04 72/178 Rest  VT-NS 3 05-Mar-2019 20:19 :01 64/171 Rest  VT-NS 2 07-Feb-2019 18:48 :02 79/190 Active  ---------------------- Last  Session 22-Jan-2019 ----------------------------------------------------------------------------------------------

## 2021-02-19 ENCOUNTER — OFFICE VISIT (OUTPATIENT)
Dept: CARDIOLOGY CLINIC | Age: 86
End: 2021-02-19
Payer: MEDICARE

## 2021-02-19 VITALS
OXYGEN SATURATION: 94 % | SYSTOLIC BLOOD PRESSURE: 136 MMHG | DIASTOLIC BLOOD PRESSURE: 78 MMHG | WEIGHT: 263 LBS | HEIGHT: 70 IN | HEART RATE: 60 BPM | BODY MASS INDEX: 37.65 KG/M2

## 2021-02-19 DIAGNOSIS — I47.20 VT (VENTRICULAR TACHYCARDIA): Primary | ICD-10-CM

## 2021-02-19 DIAGNOSIS — G47.30 SLEEP APNEA, UNSPECIFIED TYPE: ICD-10-CM

## 2021-02-19 DIAGNOSIS — N18.30 STAGE 3 CHRONIC KIDNEY DISEASE, UNSPECIFIED WHETHER STAGE 3A OR 3B CKD (HCC): ICD-10-CM

## 2021-02-19 DIAGNOSIS — I73.9 CLAUDICATION OF BOTH LOWER EXTREMITIES (HCC): ICD-10-CM

## 2021-02-19 DIAGNOSIS — I10 ESSENTIAL HYPERTENSION: ICD-10-CM

## 2021-02-19 DIAGNOSIS — E78.2 MIXED HYPERLIPIDEMIA: ICD-10-CM

## 2021-02-19 DIAGNOSIS — I25.10 CORONARY ARTERY DISEASE INVOLVING NATIVE CORONARY ARTERY OF NATIVE HEART WITHOUT ANGINA PECTORIS: ICD-10-CM

## 2021-02-19 PROCEDURE — 99214 OFFICE O/P EST MOD 30 MIN: CPT | Performed by: INTERNAL MEDICINE

## 2021-02-19 RX ORDER — OMEPRAZOLE 20 MG/1
20 CAPSULE, DELAYED RELEASE ORAL
Qty: 90 CAPSULE | Refills: 0 | Status: SHIPPED | OUTPATIENT
Start: 2021-02-19 | End: 2021-05-19

## 2021-02-19 RX ORDER — AMLODIPINE BESYLATE 10 MG/1
TABLET ORAL
Qty: 90 TABLET | Refills: 4 | Status: SHIPPED | OUTPATIENT
Start: 2021-02-19 | End: 2021-05-18

## 2021-02-19 RX ORDER — ATORVASTATIN CALCIUM 20 MG/1
20 TABLET, FILM COATED ORAL DAILY
Qty: 90 TABLET | Refills: 4 | Status: SHIPPED | OUTPATIENT
Start: 2021-02-19 | End: 2021-09-03 | Stop reason: SDUPTHER

## 2021-02-19 NOTE — PATIENT INSTRUCTIONS
Start Prilosec 20 mg   Stop Pravastatin (Pravachol)  Start Atorvastatin (Lipitor) 20 mg nightly  Device check and appt with Dr Jocelyne Venegas in May  Follow up in May/June with Dr Dunn Render

## 2021-02-19 NOTE — PROGRESS NOTES
[address]  Cardiac Consult     Referring Provider:  Tomas Cain MD     Chief Complaint   Patient presents with    Follow-up    Coronary Artery Disease    Hypertension    Hyperlipidemia        History of Present Illness:  81 y/o male formally followed by Dr. Kallie Bose seen for f/u for CAD, s/p CABG, hyperlipidemia and uncontrolled HTN. He feels well. He denies anginal type chest discomfort. He has some discomfort in left lower chest when he awakes in the morning that resolves with belching. \"\"I have an acid stomach\". He denies any exertional chest discomfort. He does not take any meds for GERD. He was found to have an 18 beat run of NSVT on pacer check. Due to this an ECHO and myoview were obtained showing normal LV function and normal perfusion (EF=69%). He was seen by EP and options discussed including cardiac cath. He is here to discuss options. I reviewed pacer checks with Dr. Johnnie Diaz. 8 episodes NSVT in 24 months. Jan 2021 18 beats, Aug 2020 6 beats March 220 4 beats. All asymptomatic. Past Medical History:   has a past medical history of Anxiety, Arthritis, CAD (coronary artery disease), Cancer ([facility] [address] GERD (gastroesophageal reflux disease), Hyperlipidemia, and Hypertension. Surgical History:   has a past surgical history that includes Appendectomy; Tonsillectomy; Knee arthroscopy; TURP; Coronary angioplasty with stent; Carpal tunnel release; TURP (12-); Cardiac surgery (2007); Prostate surgery; Mohs surgery; and pacemaker placement (10/04/2018). Social History:   reports that he quit smoking about 28 years ago. He has a 45.00 pack-year smoking history. He has never used smokeless tobacco. He reports current alcohol use. He reports that he does not use drugs. Family History:  family history includes Cancer in his father; Diabetes in his father; Heart Disease in his mother.      Allergies:  Penicillins     Home Medications:  Prior to Visit Medications    Medication Sig Taking? Authorizing Provider   furosemide (LASIX) 20 MG tablet TAKE 1 TABLET BY MOUTH TWICE A DAY Yes REAL Freeman CNP   carvedilol (COREG) 25 MG tablet TAKE 1 TABLET BY MOUTH TWICE A DAY Yes REAL Freeman CNP   tamsulosin (FLOMAX) 0.4 MG capsule TAKE 1 CAPSULE BY MOUTH EVERY DAY Yes Justus Conte MD   vitamin D (ERGOCALCIFEROL) 1.25 MG (99922 UT) CAPS capsule TAKE 1 CAPSULE BY MOUTH ONE TIME PER WEEK Yes Justus Conte MD   pravastatin (PRAVACHOL) 20 MG tablet TAKE 1 TABLET BY MOUTH DAILY Yes Sara Fraser MD   hydrALAZINE (APRESOLINE) 25 MG tablet TAKE 1 TABLET BY MOUTH TWICE A DAY Yes REAL Sahni CNP   amLODIPine (NORVASC) 10 MG tablet TAKE 1 TABLET BY MOUTH EVERY DAY AT NIGHT Yes Sara Fraser MD   glimepiride (AMARYL) 2 MG tablet Take 2 mg by mouth every morning (before breakfast) Yes Historical Provider, MD   aspirin 81 MG tablet Take 81 mg by mouth nightly  Yes Historical Provider, MD   alprazolam (XANAX) 0.25 MG tablet Take 0.25 mg by mouth 3 times daily as needed. TAKES 1/2 TAB DAILY OR TWO TIMES A DAY AS NEEDED Yes Historical Provider, MD       [x] Medications and dosages reviewed.     ROS:  [x]Full ROS obtained and negative except as mentioned in HPI       Physical Examination:    Vitals:    02/19/21 1001   BP: 136/78   Pulse: 60   SpO2: 94%      /78 (Site: Left Upper Arm, Position: Sitting, Cuff Size: Large Adult)   Pulse 60   Ht 5' 10\" (1.778 m)   Wt 263 lb (119.3 kg)   SpO2 94%   BMI 37.74 kg/m²     · GENERAL: overweight male in NAD  · NEUROLOGICAL: Alert and oriented, no motor abnormalities  · PSYCH: Calm affect  · SKIN: Warm and dry, No rash  · HEENT: Normocephalic, Sclera non-icteric, mucus membranes moist  · NECK: supple, JVP normal  · CAROTID: Normal upstroke, no bruits  · CARDIAC: Normal PMI, regular rate and rhythm, normal S1S2, no murmur, rub, or gallop  · RESPIRATORY: Normal respiratory effort, clear to auscultation bilaterally  · EXTREMITIES: interrogate pacer  I will see him as well shortly after this      Plan:  Change pravastatin to lipitor  Labs 3 months  F/u EP as scheduled and see me a couple weeks after    Thank you for allowing me to participate in the care of this individual.      Suzanne Bahena M.D., [facility]

## 2021-05-07 PROBLEM — I47.29 NSVT (NONSUSTAINED VENTRICULAR TACHYCARDIA) (HCC): Status: ACTIVE | Noted: 2021-02-19

## 2021-05-07 NOTE — PROGRESS NOTES
[facility]   Electrophysiology Follow up   Date: 5/10/2021  I had the privilege of visiting Arminda Rodriguez in the office. CC: NSVT  HPI: Arminda Rodriguez is a 80 y.o. male  with pmh CAD s/p CABG 2007, history of PCI, HTN,  HLD, HFpEF, CKD and symptomatic bradycardia. 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       Had a well care visit 01/07/2021 and complained of episodes of significant lightheadedness. Holter monitor was ordered, which showed NSVT. IC evaluation for possible LHC on 2/19/2021. Recommended monitoring for increased NSVT before proceeding with LHC d/t CRI and previous CABG. Calli Tang presents to the office in follow up. He continues to have episodes of dizziness. His blood pressure is uncontrolled and is managed by his nephrologist. Encouraged him to work on risk factor modification to aid in better blood pressure control. Does continue to have a little fluttering at times. Assessment and plan:   Brief NSVT   -Device interrogation today showed brief episodes of nonsustained VT.   - Patient states that he occasionally feels skipped beats.   -Noted on monitor   -Was asymptomatic with episodes   -On carvedilol 25 mg BID    Tachy Chidi/Symptomatc bradycardia   -s/p dual chamber PPM 10/4/2018   -The CIED was interrogated and programmed and I supervised and reviewed all the data. All findings and changes are in device interrogation sheet and reflect my personal interpretation and changes and is scanned to Epic.   11.1 years remaining, AP 80.1% ,  9.9%   -3 NSVT last 4/29/2021 longest 2 seconds    CAD   -Myoview 1/2021, not a good candidate for cath d/t CRI   -Managed by Altagracia Napier   -CABG 2007   Recent stress test was normal and EF is normal.     SAUD   -Complaint on BiPAP   -Will see sleep medicine for titration      HTN  -Not well controlled: 172/80  -Followed by nephrology, can consider increasing coreg to 50 mg BID if nephrology agrees.  He will follow up with nephrology  -BP goal <130/80  -Home BP monitoring encouraged, printed information provided on how to accurately measure BP at home.  -Counseled to follow a low salt diet to assure blood pressure remains controlled for cardiovascular risk factor modification.   -The patient is counseled to get regular exercise 3-5 times per week and maintain a healthy weight reduce cardiovascular risk factors. Obesity Body mass index is 37.02 kg/m². - Excessive weight is complicating assessment and treatment. It is placing patient at risk for multiple co-morbidities as well as early death and contributing to the patient's presentation.    - discussed weight management with diet and exercise          Patient Active Problem List    Diagnosis Date Noted    BPH (benign prostatic hyperplasia) 12/20/2010     Priority: High    NSVT (nonsustained ventricular tachycardia) ([address] 02/19/2021    Diverticulitis 11/21/2019    CKD (chronic kidney disease), stage III 10/14/2019    Proteinuria 10/14/2019    Obesity, unspecified obesity severity, unspecified obesity type 10/14/2019    S/P placement of cardiac pacemaker 10/11/2018    Pacemaker 10/10/2018    Tachy-monroe syndrome ([address]     Sleep apnea 08/02/2018    Symptomatic bradycardia 08/02/2018    Claudication of both lower extremities ([address] 05/25/2018    Chronic diastolic congestive heart failure ([address] 03/15/2018    Asymptomatic bilateral carotid artery stenosis 06/27/2017    Gastroesophageal reflux disease without esophagitis 06/27/2017    Generalized anxiety disorder 06/27/2017    History of colonic polyps 06/27/2017    History of coronary artery bypass graft 06/27/2017    History of coronary artery stent placement 06/27/2017    Type 2 diabetes mellitus with complication, without long-term current use of insulin ([address] 06/27/2017    Hyperlipidemia 10/06/2015    Edema 04/29/2015    S/P total knee arthroplasty 12/03/2014    CAD (coronary artery disease) 12/20/2010    HTN (hypertension) 12/20/2010    OA (osteoarthritis) 12/20/2010     Diagnostic studies:   ECG 5/10/21  Sinus rhythm     Echo 1/29/2021   Summary   -Technically difficult examination.   -Definity was administered.   -Suboptimal image quality.   -Normal left ventricle size, wall thickness, and systolic function with an   estimated ejection fraction of 55%.   -No regional wall motion abnormalities are seen. -E/e=7.6.   -Trivial tricuspid regurgitation. PASP of 28 mmHg. Nuclear stress 1/29/2021      Conclusions        Summary    Normal myocardial perfusion.    Normal LV size and systolic function.    ZE=54%.           I independently reviewed the cardiac diagnostic studies, ECG and relevant imaging studies. Lab Results   Component Value Date    LVEF 55 01/29/2021     Lab Results   Component Value Date    TSH 1.64 04/28/2014         Physical Examination:  Vitals:    05/10/21 1351   BP: (!) 172/80   Pulse:    Resp:    SpO2:       Wt Readings from Last 3 Encounters:   05/10/21 258 lb (117 kg)   04/06/21 257 lb (116.6 kg)   02/19/21 263 lb (119.3 kg)       · Constitutional: Oriented. No distress. · Head: Normocephalic and atraumatic. · Mouth/Throat: Oropharynx is clear and moist.   · Eyes: Conjunctivae normal. EOM are normal.   · Neck: Neck supple. No JVD present. · Cardiovascular: Normal rate, regular rhythm, S1&S2. · Pulmonary/Chest: Bilateral respiratory sounds. No rhonchi. · Abdominal: Soft. No tenderness. + obesity   · Musculoskeletal: No tenderness. No edema    · Lymphadenopathy: Has no cervical adenopathy. · Neurological: Alert and oriented. Follows command, No Gross deficit   · Skin: Skin is warm, No rash noted. · Psychiatric: Has a normal behavior     Review of System:  [x] Full ROS obtained and negative except as mentioned in HPI    Prior to Admission medications    Medication Sig Start Date End Date Taking?  Authorizing Provider   amLODIPine (NORVASC) 10 MG tablet TAKE 1 TABLET BY MOUTH EVERY DAY AT NIGHT 2/19/21  Yes Ghanshyam Layton MD   omeprazole (PRILOSEC) 20 MG delayed release capsule Take 1 capsule by mouth every morning (before breakfast) 2/19/21  Yes Ghanshyam Layton MD   atorvastatin (LIPITOR) 20 MG tablet Take 1 tablet by mouth daily 2/19/21  Yes Ghanshyam Layton MD   furosemide (LASIX) 20 MG tablet TAKE 1 TABLET BY MOUTH TWICE A DAY 2/15/21  Yes Steven AprilREAL CNP   carvedilol (COREG) 25 MG tablet TAKE 1 TABLET BY MOUTH TWICE A DAY 2/15/21  Yes Steven AprilREAL CNP   tamsulosin (FLOMAX) 0.4 MG capsule TAKE 1 CAPSULE BY MOUTH EVERY DAY 2/12/21  Yes Aretha Vang MD   vitamin D (ERGOCALCIFEROL) 1.25 MG (07777 UT) CAPS capsule TAKE 1 CAPSULE BY MOUTH ONE TIME PER WEEK 1/29/21  Yes Aretha Vang MD   hydrALAZINE (APRESOLINE) 25 MG tablet TAKE 1 TABLET BY MOUTH TWICE A DAY  Patient taking differently: 25 mg 3 times daily  6/69/25  Yes REAL Mc - CNP   glimepiride (AMARYL) 2 MG tablet Take 2 mg by mouth every morning (before breakfast)   Yes Historical Provider, MD   aspirin 81 MG tablet Take 81 mg by mouth nightly    Yes Historical Provider, MD   alprazolam (XANAX) 0.25 MG tablet Take 0.25 mg by mouth 3 times daily as needed. TAKES 1/2 TAB DAILY OR TWO TIMES A DAY AS NEEDED 3/29/10  Yes Historical Provider, MD       Allergies   Allergen Reactions    Penicillins Rash     Other reaction(s): Unknown  Tolerates now       Social History:  Reviewed. reports that he quit smoking about 29 years ago. He has a 45.00 pack-year smoking history. He has never used smokeless tobacco. He reports current alcohol use. He reports that he does not use drugs. Family History:  Reviewed. Reviewed. No family history of SCD. Relevant and available labs, and cardiovascular diagnostics reviewed. Reviewed. I independently reviewed relevant and available cardiac diagnostic tests ECG, CXR, Echo, Stress test, Device interrogation, Holter, CT scan.      - The patient is counseled to follow a low salt diet to assure blood pressure remains controlled for cardiovascular risk factor modification.   - The patient is counseled to avoid excess caffeine, and energy drinks as this may exacerbated ectopy and arrhythmia. - The patient is counseled to get regular exercise 3-5 times per week to control cardiovascular risk factors. - The patient is counseled to lose weigt to control cardiovascular risk factors. All questions and concerns were addressed to the patient/family. Alternatives to my treatment were discussed. I have discussed the above stated plan and the patient verbalized understanding and agreed with the plan. Scribe attestation: This note was scribed in the presence of Rudy Pena MD by Remedios Mclaughlin RN    Physician Attestation: I, Dr. Rudy Pena, confirm that the scribe's documentation has been prepared under my direction and personally reviewed by me in its entirety. I also confirm that the note above accurately reflects all work, treatment, procedures, and medical decision making performed by me. NOTE: This report was transcribed using voice recognition software. Every effort was made to ensure accuracy, however, inadvertent computerized transcription errors may be present.      Rudy Pena MD, MPH  [facility]   Office: [phone]  Fax: [phone]

## 2021-05-10 ENCOUNTER — NURSE ONLY (OUTPATIENT)
Dept: CARDIOLOGY CLINIC | Age: 86
End: 2021-05-10
Payer: MEDICARE

## 2021-05-10 ENCOUNTER — OFFICE VISIT (OUTPATIENT)
Dept: CARDIOLOGY CLINIC | Age: 86
End: 2021-05-10
Payer: MEDICARE

## 2021-05-10 VITALS
RESPIRATION RATE: 18 BRPM | OXYGEN SATURATION: 99 % | SYSTOLIC BLOOD PRESSURE: 172 MMHG | DIASTOLIC BLOOD PRESSURE: 80 MMHG | HEIGHT: 70 IN | BODY MASS INDEX: 36.94 KG/M2 | HEART RATE: 60 BPM | WEIGHT: 258 LBS

## 2021-05-10 DIAGNOSIS — I25.10 CORONARY ARTERY DISEASE INVOLVING NATIVE CORONARY ARTERY OF NATIVE HEART WITHOUT ANGINA PECTORIS: Primary | ICD-10-CM

## 2021-05-10 DIAGNOSIS — R00.1 SYMPTOMATIC BRADYCARDIA: ICD-10-CM

## 2021-05-10 DIAGNOSIS — Z95.1 HISTORY OF CORONARY ARTERY BYPASS GRAFT: ICD-10-CM

## 2021-05-10 DIAGNOSIS — I49.5 TACHY-BRADY SYNDROME (HCC): ICD-10-CM

## 2021-05-10 DIAGNOSIS — Z95.0 S/P PLACEMENT OF CARDIAC PACEMAKER: ICD-10-CM

## 2021-05-10 DIAGNOSIS — I10 ESSENTIAL HYPERTENSION: ICD-10-CM

## 2021-05-10 DIAGNOSIS — Z95.0 PACEMAKER: ICD-10-CM

## 2021-05-10 PROCEDURE — 93280 PM DEVICE PROGR EVAL DUAL: CPT | Performed by: INTERNAL MEDICINE

## 2021-05-10 PROCEDURE — 93000 ELECTROCARDIOGRAM COMPLETE: CPT | Performed by: INTERNAL MEDICINE

## 2021-05-10 PROCEDURE — 99214 OFFICE O/P EST MOD 30 MIN: CPT | Performed by: INTERNAL MEDICINE

## 2021-05-10 NOTE — LETTER
[address] [address]  Phone: [phone]  Fax: [phone]    Mala Charlton MD        May 12, 2021     Kyle Rios MD  [address] [address]    Patient: Osman Brizuela  MR Number: [id number]  YOB: 1935  Date of Visit: 5/10/2021    Dear Dr. Kyle Rios:    Thank you for the request for consultation for Wally Billingsley to me for the evaluation of ***. Below are the relevant portions of my assessment and plan of care. If you have questions, please do not hesitate to call me. I look forward to following Lorraine Bravo along with you.     Sincerely,        Mala Charlton MD

## 2021-06-04 ENCOUNTER — OFFICE VISIT (OUTPATIENT)
Dept: CARDIOLOGY CLINIC | Age: 86
End: 2021-06-04
Payer: MEDICARE

## 2021-06-04 VITALS
WEIGHT: 259 LBS | SYSTOLIC BLOOD PRESSURE: 146 MMHG | OXYGEN SATURATION: 98 % | HEART RATE: 60 BPM | DIASTOLIC BLOOD PRESSURE: 78 MMHG | BODY MASS INDEX: 37.08 KG/M2 | HEIGHT: 70 IN | RESPIRATION RATE: 18 BRPM

## 2021-06-04 DIAGNOSIS — I73.9 CLAUDICATION OF BOTH LOWER EXTREMITIES (HCC): ICD-10-CM

## 2021-06-04 DIAGNOSIS — I10 ESSENTIAL HYPERTENSION: ICD-10-CM

## 2021-06-04 DIAGNOSIS — G47.30 SLEEP APNEA, UNSPECIFIED TYPE: ICD-10-CM

## 2021-06-04 DIAGNOSIS — I47.29 NSVT (NONSUSTAINED VENTRICULAR TACHYCARDIA): ICD-10-CM

## 2021-06-04 DIAGNOSIS — I25.10 CORONARY ARTERY DISEASE INVOLVING NATIVE CORONARY ARTERY OF NATIVE HEART WITHOUT ANGINA PECTORIS: Primary | ICD-10-CM

## 2021-06-04 DIAGNOSIS — N18.30 STAGE 3 CHRONIC KIDNEY DISEASE, UNSPECIFIED WHETHER STAGE 3A OR 3B CKD (HCC): ICD-10-CM

## 2021-06-04 DIAGNOSIS — E78.2 MIXED HYPERLIPIDEMIA: ICD-10-CM

## 2021-06-04 PROCEDURE — 99214 OFFICE O/P EST MOD 30 MIN: CPT | Performed by: INTERNAL MEDICINE

## 2021-06-04 NOTE — PATIENT INSTRUCTIONS
Try Co-Q10 200-400 mg daily for aches   Try to work on weight loss  Continue current medications  Follow up in 3-4 months

## 2021-06-04 NOTE — PROGRESS NOTES
[facility]  Cardiac Consult     Referring Provider:  Raymond Schwartz MD     Chief Complaint   Patient presents with    3 Month Follow-Up    Coronary Artery Disease    Hypertension    Hyperlipidemia        History of Present Illness:  81 y/o male formally followed by Dr. Ann Gaines seen for f/u for CAD, s/p CABG, hyperlipidemia and uncontrolled HTN. He feels well. He denies anginal type chest discomfort. He has some discomfort in left lower chest when he awakes in the morning that resolves with belching. \"\"I have an acid stomach\". He denies any exertional chest discomfort. He does not take any meds for GERD. He was found to have an 18 beat run of NSVT on pacer check. Due to this an ECHO and myoview were obtained showing normal LV function and normal perfusion (EF=69%). He was seen by EP and options discussed including cardiac cath. We discussed options. I reviewed pacer checks with Dr. Mandy Marina. 8 episodes NSVT in 24 months. Jan 2021 18 beats, Aug 2020 6 beats March 220 4 beats. All asymptomatic. Due to his age, abnormal renal function and overall risk we have deferred on cardiac cath. He is doing well overall. He is having some issues with his BP and meds are being adjusted by nephrology. He also has leg aching and bilateral knee pain. Weight going up. Wife has lost 12 lbs, but he keeps snaking. Past Medical History:   has a past medical history of Anxiety, Arthritis, CAD (coronary artery disease), Cancer ([address] GERD (gastroesophageal reflux disease), Hyperlipidemia, and Hypertension. Surgical History:   has a past surgical history that includes Appendectomy; Tonsillectomy; Knee arthroscopy; TURP; Coronary angioplasty with stent; Carpal tunnel release; TURP (12-); Cardiac surgery (2007); Prostate surgery; Mohs surgery; and pacemaker placement (10/04/2018). Social History:   reports that he quit smoking about 29 years ago. He has a 45.00 pack-year smoking history.  He has never used oriented, no motor abnormalities  · PSYCH: Calm affect  · SKIN: Warm and dry, No rash  · HEENT: Normocephalic, Sclera non-icteric, mucus membranes moist  · NECK: supple, JVP normal  · CAROTID: Normal upstroke, no bruits  · CARDIAC: Normal PMI, regular rate and rhythm, normal S1S2, No murmur, rub, or gallop  · RESPIRATORY: Normal respiratory effort, clear to auscultation bilaterally  · EXTREMITIES: No LE edema  · MUSCULOSKELETAL: No joint swelling or tenderness, no chest wall tenderness  · GASTROINTESTINAL: normal bowel sounds, soft, non-tender, no bruit      MYOVIEW  Normal myocardial perfusion. Normal LV size and systolic function. EF=67%. ECHO   -Technically difficult examination.   -Definity was administered.   -Suboptimal image quality.   -Normal left ventricle size, wall thickness, and systolic function with an   estimated ejection fraction of 55%.   -No regional wall motion abnormalities are seen. -E/e=7.6.   -Trivial tricuspid regurgitation. PASP of 28 mmHg. LABS 12/2020    Creat= 1.46-1.67  ObDZ2c=4.5  LDL=69    5/2021  Creat=1.39      Assessment:       HTN:  BP (!) 146/78 (Site: Left Upper Arm, Position: Sitting, Cuff Size: Large Adult)   Pulse 60   Resp 18   Ht 5' 10\" (1.778 m)   Wt 259 lb (117.5 kg)   SpO2 98%   BMI 37.16 kg/m²   Fair control  Follow on current therapy    CAD:  Continue medical therapy  Myoview 1/2021 normal.  No angina. Follow for now  Myoview 8/2018 fixed inferior scar vs attenuation. S/P CABG 2007  Negative GXT [place] 2015  Normal LV function 2015 as well on ECHO  myoview 2012 \"inferior wall attenuation\"      Hyperlipidemia:  . LDL=69. Change pravastatin to lipitor 20  Repeat 3 months    Sleep Apnea: On CPAP. Controlled. Continue. He just had machine re calibrated. Bradycardia:  Resolved s/p pacer. Stable    Claudication:  PCI left leg  He has f/u Dr. Saintclair Ply 7/2020. Seems stable.  Current symptoms sound more c/w knee issues    Epistaxis  Resolved off of plavix  Follow    CRI:  Creat 1.39  Improved. Continue renal f/u    NSVT:  Stable. follow  H/o short NSVT 4-5 beats on pacer about every4-6 months, asymptomatic  This last episode was 18 beats and asymptomatic. Stress and myoview normal  Discussed with pt and Dr. Beth Christina. He is at increased risk with invasive procedures due to age, CRI and prior CABG making it difficult to limit contrast  At this time with no symptoms and normal LV will follow.  If he has increased episodes of NSVT will need to cath        Plan:  Stable cardiac status  Continue lipitor  Try CoQ10  F/u 3 months  Needs weight loss improved diet      Thank you for allowing me to participate in the care of this individual.      Froilan Wolf M.D., [facility]

## 2021-08-11 ENCOUNTER — NURSE ONLY (OUTPATIENT)
Dept: CARDIOLOGY CLINIC | Age: 86
End: 2021-08-11
Payer: MEDICARE

## 2021-08-11 DIAGNOSIS — Z95.0 PACEMAKER: Primary | ICD-10-CM

## 2021-08-11 DIAGNOSIS — I49.5 TACHY-BRADY SYNDROME (HCC): ICD-10-CM

## 2021-08-11 PROCEDURE — 93294 REM INTERROG EVL PM/LDLS PM: CPT | Performed by: INTERNAL MEDICINE

## 2021-08-11 PROCEDURE — 93296 REM INTERROG EVL PM/IDS: CPT | Performed by: INTERNAL MEDICINE

## 2021-09-03 ENCOUNTER — OFFICE VISIT (OUTPATIENT)
Dept: CARDIOLOGY CLINIC | Age: 86
End: 2021-09-03
Payer: MEDICARE

## 2021-09-03 VITALS
WEIGHT: 256 LBS | OXYGEN SATURATION: 99 % | HEIGHT: 70 IN | SYSTOLIC BLOOD PRESSURE: 138 MMHG | DIASTOLIC BLOOD PRESSURE: 66 MMHG | HEART RATE: 60 BPM | BODY MASS INDEX: 36.65 KG/M2

## 2021-09-03 DIAGNOSIS — I10 ESSENTIAL HYPERTENSION: ICD-10-CM

## 2021-09-03 DIAGNOSIS — G47.30 SLEEP APNEA, UNSPECIFIED TYPE: ICD-10-CM

## 2021-09-03 DIAGNOSIS — I25.10 CORONARY ARTERY DISEASE INVOLVING NATIVE CORONARY ARTERY OF NATIVE HEART WITHOUT ANGINA PECTORIS: Primary | ICD-10-CM

## 2021-09-03 DIAGNOSIS — R00.1 SYMPTOMATIC BRADYCARDIA: ICD-10-CM

## 2021-09-03 DIAGNOSIS — I47.29 NSVT (NONSUSTAINED VENTRICULAR TACHYCARDIA): ICD-10-CM

## 2021-09-03 DIAGNOSIS — E78.2 MIXED HYPERLIPIDEMIA: ICD-10-CM

## 2021-09-03 DIAGNOSIS — N18.30 STAGE 3 CHRONIC KIDNEY DISEASE, UNSPECIFIED WHETHER STAGE 3A OR 3B CKD (HCC): ICD-10-CM

## 2021-09-03 PROCEDURE — 99214 OFFICE O/P EST MOD 30 MIN: CPT | Performed by: INTERNAL MEDICINE

## 2021-09-03 RX ORDER — FUROSEMIDE 20 MG/1
TABLET ORAL
Qty: 180 TABLET | Refills: 4 | Status: SHIPPED | OUTPATIENT
Start: 2021-09-03 | End: 2022-05-03 | Stop reason: SDUPTHER

## 2021-09-03 RX ORDER — CARVEDILOL 25 MG/1
TABLET ORAL
Qty: 180 TABLET | Refills: 4 | Status: SHIPPED | OUTPATIENT
Start: 2021-09-03 | End: 2022-05-03 | Stop reason: SDUPTHER

## 2021-09-03 RX ORDER — ATORVASTATIN CALCIUM 20 MG/1
20 TABLET, FILM COATED ORAL DAILY
Qty: 90 TABLET | Refills: 4 | Status: SHIPPED | OUTPATIENT
Start: 2021-09-03 | End: 2022-05-03 | Stop reason: SDUPTHER

## 2021-09-03 NOTE — PROGRESS NOTES
[facility]  Cardiac Consult     Referring Provider:  Sumit Skelton MD     Chief Complaint   Patient presents with    3 Month Follow-Up    Coronary Artery Disease    Hypertension    Hyperlipidemia        History of Present Illness:  79 y/o male formally followed by Dr. Warren Delatorre seen for f/u for CAD, s/p CABG, hyperlipidemia and uncontrolled HTN. He feels well. He denies anginal type chest discomfort. He has some discomfort in left lower chest when he awakes in the morning that resolves with belching. \"\"I have an acid stomach\". He denies any exertional chest discomfort. He does not take any meds for GERD. He was found to have an 18 beat run of NSVT on pacer check. Due to this an ECHO and myoview were obtained showing normal LV function and normal perfusion (EF=69%). He was seen by EP and options discussed including cardiac cath. We discussed options. I reviewed pacer checks with Dr. Renata Lagos. 8 episodes NSVT in 24 months. Jan 2021 18 beats, Aug 2020 6 beats March 220 4 beats. All asymptomatic. Due to his age, abnormal renal function and overall risk we have deferred on cardiac cath. He is doing OK. No chest pain. Pacer interrogation without VT. He is dizzy at times. He says he has vertigo and thinks his BP may be low at times. He was admitted with HTN crisis and nifedipine added. BP now controlled. No documented hypotension. Past Medical History:   has a past medical history of Anxiety, Arthritis, CAD (coronary artery disease), Cancer ([address] GERD (gastroesophageal reflux disease), Hyperlipidemia, and Hypertension. Surgical History:   has a past surgical history that includes Appendectomy; Tonsillectomy; Knee arthroscopy; TURP; Coronary angioplasty with stent; Carpal tunnel release; TURP (12-); Cardiac surgery (2007); Prostate surgery; Mohs surgery; and pacemaker placement (10/04/2018). Social History:   reports that he quit smoking about 29 years ago.  He has a 45.00 pack-year smoking history. He has never used smokeless tobacco. He reports current alcohol use. He reports that he does not use drugs. Family History:  family history includes Cancer in his father; Diabetes in his father; Heart Disease in his mother. Allergies:  Penicillins     Home Medications:  Prior to Visit Medications    Medication Sig Taking? Authorizing Provider   lisinopril (PRINIVIL;ZESTRIL) 5 MG tablet TAKE 1 TABLET BY MOUTH EVERY DAY Yes uJana Biswas MD   tamsulosin (FLOMAX) 0.4 MG capsule TAKE 1 CAPSULE BY MOUTH EVERY DAY Yes Juana Biswas MD   NIFEdipine (ADALAT CC) 60 MG extended release tablet TAKE 1 TABLET BY MOUTH EVERY DAY Yes Historical Provider, MD   hydrALAZINE (APRESOLINE) 100 MG tablet Take 1 tablet by mouth 3 times daily  Patient taking differently: Take 100 mg by mouth 3 times daily Takes 50 mg in AM, 100 mg at noon and 100 mg at night. Yes REAL Arredondo CNP   atorvastatin (LIPITOR) 20 MG tablet Take 1 tablet by mouth daily Yes Mally Reinoso MD   furosemide (LASIX) 20 MG tablet TAKE 1 TABLET BY MOUTH TWICE A DAY  Patient taking differently: TAKE 1 TABLET BY MOUTH ONCE A DAY Yes REAL Taveras CNP   carvedilol (COREG) 25 MG tablet TAKE 1 TABLET BY MOUTH TWICE A DAY Yes REAL Taveras CNP   vitamin D (ERGOCALCIFEROL) 1.25 MG (44153 UT) CAPS capsule TAKE 1 CAPSULE BY MOUTH ONE TIME PER WEEK Yes Juana Biswas MD   glimepiride (AMARYL) 2 MG tablet Take 2 mg by mouth every morning (before breakfast) Yes Historical Provider, MD   aspirin 81 MG tablet Take 81 mg by mouth nightly  Yes Historical Provider, MD   alprazolam (XANAX) 0.25 MG tablet Take 0.25 mg by mouth 3 times daily as needed. TAKES 1/2 TAB DAILY OR TWO TIMES A DAY AS NEEDED Yes Historical Provider, MD       [x] Medications and dosages reviewed.     ROS:  [x]Full ROS obtained and negative except as mentioned in HPI       Physical Examination:    Vitals:    09/03/21 1058   BP: 138/66   Pulse: 60   SpO2: 99%      /66 (Site: Left Upper Arm, Position: Sitting, Cuff Size: Large Adult)   Pulse 60   Ht 5' 10\" (1.778 m)   Wt 256 lb (116.1 kg)   SpO2 99%   BMI 36.73 kg/m²     · GENERAL: overweight male in NAD  · NEUROLOGICAL: Alert and oriented, no motor abnormalities  · PSYCH: Calm affect  · SKIN: Warm and dry, No rash  · HEENT: Normocephalic, Sclera non-icteric, mucus membranes moist  · NECK: supple, JVP normal  · CAROTID: Normal upstroke, no bruits  · CARDIAC: Normal PMI, regular rate and rhythm, normal S1S2, No murmur, rub, or gallop  · RESPIRATORY: Normal respiratory effort, clear to auscultation bilaterally  · EXTREMITIES: No LE edema  · MUSCULOSKELETAL: No joint swelling or tenderness, no chest wall tenderness  · GASTROINTESTINAL: normal bowel sounds, soft, non-tender, no bruit      MYOVIEW  Normal myocardial perfusion. Normal LV size and systolic function. EF=67%. ECHO   -Technically difficult examination.   -Definity was administered.   -Suboptimal image quality.   -Normal left ventricle size, wall thickness, and systolic function with an   estimated ejection fraction of 55%.   -No regional wall motion abnormalities are seen. -E/e=7.6.   -Trivial tricuspid regurgitation. PASP of 28 mmHg. LABS 12/2020    Creat= 1.46-1.67  TjAD2m=1.5  LDL=69    8/2021  Creat=1.8    Pacer check 8/2021  No significant VT- 1 episode report 6/2021 of 1 second. Assessment:       HTN:  /66 (Site: Left Upper Arm, Position: Sitting, Cuff Size: Large Adult)   Pulse 60   Ht 5' 10\" (1.778 m)   Wt 256 lb (116.1 kg)   SpO2 99%   BMI 36.73 kg/m²   Currently well controlled continue current therapy including nifedipine    CAD:  Continue medical therapy. Stable without angina  Myoview 1/2021 normal.  No angina. Follow for now  Myoview 8/2018 fixed inferior scar vs attenuation.     S/P CABG 2007  Negative GXT [place] 2015  Normal LV function 2015 as well on ECHO  myoview 2012 \"inferior wall attenuation\"      Hyperlipidemia:  Continue lipitor  Needs lipids with next blood draw    Sleep Apnea: On CPAP. Controlled. Continue. He just had machine re calibrated. Bradycardia:  Resolved s/p pacer. Stable    Claudication:  PCI left leg  He has f/u Dr. Hathaway Poag 7/2020. Seems stable. Current symptoms sound more c/w knee issues    Epistaxis  Resolved off of plavix  Follow    CRI:  Creat 1.8  Slightly worsened from 6 months ago. Follows with nephrology    NSVT:  Stable. Follow. Recent pacer check with no significant VT  H/o short NSVT 4-5 beats on pacer about every 4-6 months, asymptomatic  This last episode was 18 beats and asymptomatic. Stress and myoview normal  Discussed with pt and Dr. Stephanie Floyd. He is at increased risk with invasive procedures due to age, CRI and prior CABG making it difficult to limit contrast  At this time with no symptoms and normal LV will follow.  If he has increased episodes of NSVT will need to cath        Plan:  Stable cardiac status  Same meds  F/u 4 months      Thank you for allowing me to participate in the care of this individual.      Luis Ramirez M.D., [facility]

## 2021-11-09 PROCEDURE — 93294 REM INTERROG EVL PM/LDLS PM: CPT | Performed by: INTERNAL MEDICINE

## 2021-11-09 PROCEDURE — 93296 REM INTERROG EVL PM/IDS: CPT | Performed by: INTERNAL MEDICINE

## 2021-11-10 ENCOUNTER — NURSE ONLY (OUTPATIENT)
Dept: CARDIOLOGY CLINIC | Age: 86
End: 2021-11-10

## 2021-11-10 DIAGNOSIS — Z95.0 PACEMAKER: ICD-10-CM

## 2021-11-10 DIAGNOSIS — R00.1 SYMPTOMATIC BRADYCARDIA: ICD-10-CM

## 2022-01-11 ENCOUNTER — OFFICE VISIT (OUTPATIENT)
Dept: CARDIOLOGY CLINIC | Age: 87
End: 2022-01-11
Payer: MEDICARE

## 2022-01-11 VITALS
SYSTOLIC BLOOD PRESSURE: 130 MMHG | WEIGHT: 258 LBS | DIASTOLIC BLOOD PRESSURE: 62 MMHG | HEART RATE: 60 BPM | BODY MASS INDEX: 36.94 KG/M2 | HEIGHT: 70 IN | OXYGEN SATURATION: 98 %

## 2022-01-11 DIAGNOSIS — G47.30 SLEEP APNEA, UNSPECIFIED TYPE: ICD-10-CM

## 2022-01-11 DIAGNOSIS — N18.30 STAGE 3 CHRONIC KIDNEY DISEASE, UNSPECIFIED WHETHER STAGE 3A OR 3B CKD (HCC): ICD-10-CM

## 2022-01-11 DIAGNOSIS — I10 PRIMARY HYPERTENSION: ICD-10-CM

## 2022-01-11 DIAGNOSIS — I47.29 NSVT (NONSUSTAINED VENTRICULAR TACHYCARDIA): ICD-10-CM

## 2022-01-11 DIAGNOSIS — I25.10 CORONARY ARTERY DISEASE INVOLVING NATIVE CORONARY ARTERY OF NATIVE HEART WITHOUT ANGINA PECTORIS: Primary | ICD-10-CM

## 2022-01-11 DIAGNOSIS — Z95.0 PACEMAKER: ICD-10-CM

## 2022-01-11 DIAGNOSIS — E78.2 MIXED HYPERLIPIDEMIA: ICD-10-CM

## 2022-01-11 DIAGNOSIS — I73.9 CLAUDICATION OF BOTH LOWER EXTREMITIES (HCC): ICD-10-CM

## 2022-01-11 PROCEDURE — 99214 OFFICE O/P EST MOD 30 MIN: CPT | Performed by: INTERNAL MEDICINE

## 2022-01-11 RX ORDER — OMEPRAZOLE 20 MG/1
20 CAPSULE, DELAYED RELEASE ORAL
Qty: 30 CAPSULE | Refills: 3 | Status: SHIPPED | OUTPATIENT
Start: 2022-01-11 | End: 2022-02-17

## 2022-01-11 NOTE — PROGRESS NOTES
[facility]  Cardiac Consult     Referring Provider:  Rony Sosa MD     Chief Complaint   Patient presents with    Coronary Artery Disease    Hypertension        History of Present Illness:  81 y/o male formally followed by Dr. Jessica Santos seen for f/u for CAD, s/p CABG, hyperlipidemia and uncontrolled HTN. He feels well. He denies anginal type chest discomfort. He has some discomfort in left lower chest when he awakes in the morning that resolves with belching. \"\"I have an acid stomach\". He denies any exertional chest discomfort. He does not take any meds for GERD. He was found to have an 18 beat run of NSVT on pacer check. Due to this an ECHO and myoview were obtained showing normal LV function and normal perfusion (EF=69%). He was seen by EP and options discussed including cardiac cath. We discussed options. I reviewed pacer checks with Dr. Angélica Alexander. 8 episodes NSVT in 24 months. Jan 2021 18 beats, Aug 2020 6 beats March 220 4 beats. All asymptomatic. Due to his age, abnormal renal function and overall risk we have deferred on cardiac cath. Estimated 10 years remaining on pacer. Last pacer check 11/10/2021 without VT. He says he has fatigue with exercise. He feels that his heart rate doesn't increase and he gets fatigued and \"legs get tired\". He uses CPAP. Awakens with bloating and some epigastric pressure. Belches a few times and resolves. He also gets some discomfrot after meals that resolves with TUMS    Past Medical History:   has a past medical history of Anxiety, Arthritis, CAD (coronary artery disease), Cancer ([address] GERD (gastroesophageal reflux disease), Hyperlipidemia, and Hypertension. Surgical History:   has a past surgical history that includes Appendectomy; Tonsillectomy; Knee arthroscopy; TURP; Coronary angioplasty with stent; Carpal tunnel release; TURP (12-); Cardiac surgery (2007); Prostate surgery; Mohs surgery; and pacemaker placement (10/04/2018). Social History:   reports that he quit smoking about 29 years ago. He has a 45.00 pack-year smoking history. He has never used smokeless tobacco. He reports current alcohol use. He reports that he does not use drugs. Family History:  family history includes Cancer in his father; Diabetes in his father; Heart Disease in his mother. Allergies:  Penicillins     Home Medications:  Prior to Visit Medications    Medication Sig Taking? Authorizing Provider   hydrALAZINE (APRESOLINE) 100 MG tablet TAKE 1 TABLET BY MOUTH 3 TIMES DAILY TAKES 50 MG IN AM, 100 MG AT NOON  MG AT NIGHT. Yes Phuc Goodwin MD   lisinopril (PRINIVIL;ZESTRIL) 5 MG tablet TAKE 1 TABLET BY MOUTH EVERY DAY Yes Phuc Goodwin MD   NIFEdipine (ADALAT CC) 60 MG extended release tablet TAKE 1 TABLET BY MOUTH EVERY DAY Yes REAL William CNP   atorvastatin (LIPITOR) 20 MG tablet Take 1 tablet by mouth daily Yes Sammie Brittle, MD   furosemide (LASIX) 20 MG tablet TAKE 1 TABLET BY MOUTH ONCE A DAY, CAN TAKE 1 TAB IN EVENING IF SWELLING Yes Sammie Brittle, MD   carvedilol (COREG) 25 MG tablet TAKE 1 TABLET BY MOUTH TWICE A DAY Yes Sammie Brittle, MD   tamsulosin (FLOMAX) 0.4 MG capsule TAKE 1 CAPSULE BY MOUTH EVERY DAY Yes Phuc Goodwin MD   glimepiride (AMARYL) 2 MG tablet Take 2 mg by mouth every morning (before breakfast) Yes Historical Provider, MD   aspirin 81 MG tablet Take 81 mg by mouth nightly  Yes Historical Provider, MD   alprazolam (XANAX) 0.25 MG tablet Take 0.25 mg by mouth 3 times daily as needed. TAKES 1/2 TAB DAILY OR TWO TIMES A DAY AS NEEDED Yes Historical Provider, MD       [x] Medications and dosages reviewed.     ROS:  [x]Full ROS obtained and negative except as mentioned in HPI       Physical Examination:    Vitals:    01/11/22 1100   BP: 130/62   Pulse: 60   SpO2: 98%      /62 (Site: Left Upper Arm, Position: Sitting, Cuff Size: Large Adult)   Pulse 60   Ht 5' 10\" (1.778 m)   Wt 258 lb (117 kg)   SpO2 98%   BMI 37.02 kg/m²    HR remains 60 with walking. · GENERAL: overweight male in NAD  · NEUROLOGICAL: Alert and oriented, no motor abnormalities  · PSYCH: Calm affect  · SKIN: Warm and dry, No rash  · HEENT: Normocephalic, Sclera non-icteric, mucus membranes moist  · NECK: supple, JVP normal  · CAROTID: Normal upstroke, no bruits  · CARDIAC: Normal PMI, regular rate and rhythm, normal S1S2, No murmur, rub, or gallop  · RESPIRATORY: Normal respiratory effort, clear to auscultation bilaterally  · EXTREMITIES: No LE edema  · MUSCULOSKELETAL: No joint swelling or tenderness, no chest wall tenderness  · GASTROINTESTINAL: normal bowel sounds, soft, non-tender, no bruit      MYOVIEW 1/2021  Normal myocardial perfusion. Normal LV size and systolic function. EF=67%. ECHO 1/2021   -Technically difficult examination.   -Definity was administered.   -Suboptimal image quality.   -Normal left ventricle size, wall thickness, and systolic function with an   estimated ejection fraction of 55%.   -No regional wall motion abnormalities are seen. -E/e=7.6.   -Trivial tricuspid regurgitation. PASP of 28 mmHg. LABS     Creat= 1.61    Pacer check 1/2021  No VT    Assessment:       HTN:  /62 (Site: Left Upper Arm, Position: Sitting, Cuff Size: Large Adult)   Pulse 60   Ht 5' 10\" (1.778 m)   Wt 258 lb (117 kg)   SpO2 98%   BMI 37.02 kg/m²   Well controlled. Continue current meds including lisinopril and coreg    CAD:  Continue medical therapy. Stable without angina  Myoview 1/2021 normal.  No angina. Follow for now  Myoview 8/2018 fixed inferior scar vs attenuation. S/P CABG 2007  Negative GXT [place] 2015  Normal LV function 2015 as well on ECHO  myoview 2012 \"inferior wall attenuation\"      Hyperlipidemia:  LDL=69, controlled  Continue lipitor      Sleep Apnea: On CPAP. Controlled. Continue. He just had machine re calibrated. Bradycardia:  Resolved s/p pacer.    HR does not increase with exercise. Discussed with Kuldip who will see him and adjust settings    Claudication:  PCI left leg 2019  Did not f/u with Dr. Avery Davis stable. Current symptoms sound more c/w knee issues    Epistaxis  Resolved off of plavix  Follow    CRI:  Creat 1.6  Slightly improved. Follow  Follows with nephrology    NSVT:  Stable. Follow. Recent pacer check again with no significant VT  H/o short NSVT 4-5 beats on pacer about every 4-6 months, asymptomatic  This last episode was 18 beats and asymptomatic. Stress and myoview normal  Discussed with pt and Dr. Angélica Alexander. He is at increased risk with invasive procedures due to age, CRI and prior CABG making it difficult to limit contrast  At this time with no symptoms and normal LV will follow.  If he has increased episodes of NSVT will need to cath    Chest Pain:  Sounds GI  Recent negative stress  He stopped prilosec and will restart    Plan:  [facility] will call him to adjust pacer for rate responsiveness  F/u me 3 months      Thank you for allowing me to participate in the care of this individual.      Thomasenia Osgood, M.D., [facility] [place]

## 2022-01-11 NOTE — PATIENT INSTRUCTIONS
Restart Prilosec daily  No other med changes  We will call you with Dr Sebastian Patterson response about pacemaker  Follow up in 3 months

## 2022-01-12 ENCOUNTER — NURSE ONLY (OUTPATIENT)
Dept: CARDIOLOGY CLINIC | Age: 87
End: 2022-01-12
Payer: MEDICARE

## 2022-01-12 DIAGNOSIS — Z95.0 PACEMAKER: ICD-10-CM

## 2022-01-12 DIAGNOSIS — I49.5 TACHY-BRADY SYNDROME (HCC): ICD-10-CM

## 2022-01-12 DIAGNOSIS — R00.1 SYMPTOMATIC BRADYCARDIA: ICD-10-CM

## 2022-01-12 NOTE — PROGRESS NOTES
Patient comes in for programming evaluation for his pacemaker. All sensing and pacing parameters are within normal range. Battery life 10.2 years  AP 83.3%.  4.5%. 2 NSVT episodes noted. Last on 12/25/2021, longest 1 seconds. Patient remains on Coreg. Today we turned on RR. Please see interrogation for more detail. Patient will follow up in 3 months in office or remotely.

## 2022-01-14 PROCEDURE — 93280 PM DEVICE PROGR EVAL DUAL: CPT | Performed by: INTERNAL MEDICINE

## 2022-02-09 ENCOUNTER — NURSE ONLY (OUTPATIENT)
Dept: CARDIOLOGY CLINIC | Age: 87
End: 2022-02-09
Payer: MEDICARE

## 2022-02-09 DIAGNOSIS — I49.5 TACHY-BRADY SYNDROME (HCC): ICD-10-CM

## 2022-02-09 DIAGNOSIS — Z95.0 PACEMAKER: ICD-10-CM

## 2022-02-09 PROCEDURE — 93296 REM INTERROG EVL PM/IDS: CPT | Performed by: INTERNAL MEDICINE

## 2022-02-09 PROCEDURE — 93294 REM INTERROG EVL PM/LDLS PM: CPT | Performed by: INTERNAL MEDICINE

## 2022-02-09 NOTE — PROGRESS NOTES
We received remote transmission from patient's monitor at home. Transmission shows normal sensing and pacing function. Noted NSVT. Pt is on Coreg. EP physician will review. See interrogation under cardiology tab in the [address] [facility] [address] field for more details.

## 2022-02-17 RX ORDER — OMEPRAZOLE 20 MG/1
CAPSULE, DELAYED RELEASE ORAL
Qty: 90 CAPSULE | Refills: 0 | Status: SHIPPED | OUTPATIENT
Start: 2022-02-17 | End: 2022-09-15

## 2022-02-17 NOTE — TELEPHONE ENCOUNTER
Pt wants a 90 day supply and there is only 30 on the last script that was sent in to pharmacy on 1-11-22.

## 2022-05-03 ENCOUNTER — OFFICE VISIT (OUTPATIENT)
Dept: CARDIOLOGY CLINIC | Age: 87
End: 2022-05-03
Payer: MEDICARE

## 2022-05-03 VITALS
WEIGHT: 256 LBS | BODY MASS INDEX: 36.65 KG/M2 | DIASTOLIC BLOOD PRESSURE: 72 MMHG | OXYGEN SATURATION: 96 % | SYSTOLIC BLOOD PRESSURE: 130 MMHG | HEART RATE: 80 BPM | HEIGHT: 70 IN

## 2022-05-03 DIAGNOSIS — I10 ESSENTIAL HYPERTENSION: ICD-10-CM

## 2022-05-03 DIAGNOSIS — E78.2 MIXED HYPERLIPIDEMIA: ICD-10-CM

## 2022-05-03 DIAGNOSIS — N18.30 STAGE 3 CHRONIC KIDNEY DISEASE, UNSPECIFIED WHETHER STAGE 3A OR 3B CKD (HCC): ICD-10-CM

## 2022-05-03 DIAGNOSIS — I25.10 CORONARY ARTERY DISEASE INVOLVING NATIVE CORONARY ARTERY OF NATIVE HEART WITHOUT ANGINA PECTORIS: Primary | ICD-10-CM

## 2022-05-03 DIAGNOSIS — I73.9 CLAUDICATION OF BOTH LOWER EXTREMITIES (HCC): ICD-10-CM

## 2022-05-03 DIAGNOSIS — E11.22 TYPE 2 DIABETES MELLITUS WITH STAGE 3 CHRONIC KIDNEY DISEASE AND HYPERTENSION (HCC): ICD-10-CM

## 2022-05-03 DIAGNOSIS — I47.29 NSVT (NONSUSTAINED VENTRICULAR TACHYCARDIA): ICD-10-CM

## 2022-05-03 DIAGNOSIS — N18.30 TYPE 2 DIABETES MELLITUS WITH STAGE 3 CHRONIC KIDNEY DISEASE AND HYPERTENSION (HCC): ICD-10-CM

## 2022-05-03 DIAGNOSIS — I10 PRIMARY HYPERTENSION: ICD-10-CM

## 2022-05-03 DIAGNOSIS — Z95.0 PACEMAKER: ICD-10-CM

## 2022-05-03 DIAGNOSIS — R00.1 SYMPTOMATIC BRADYCARDIA: ICD-10-CM

## 2022-05-03 DIAGNOSIS — I12.9 TYPE 2 DIABETES MELLITUS WITH STAGE 3 CHRONIC KIDNEY DISEASE AND HYPERTENSION (HCC): ICD-10-CM

## 2022-05-03 DIAGNOSIS — G47.30 SLEEP APNEA, UNSPECIFIED TYPE: ICD-10-CM

## 2022-05-03 PROCEDURE — 99214 OFFICE O/P EST MOD 30 MIN: CPT | Performed by: INTERNAL MEDICINE

## 2022-05-03 RX ORDER — HYDRALAZINE HYDROCHLORIDE 100 MG/1
100 TABLET, FILM COATED ORAL 3 TIMES DAILY
Qty: 270 TABLET | Refills: 4 | Status: SHIPPED | OUTPATIENT
Start: 2022-05-03 | End: 2022-07-05

## 2022-05-03 RX ORDER — NIFEDIPINE 60 MG/1
60 TABLET, FILM COATED, EXTENDED RELEASE ORAL DAILY
Qty: 90 TABLET | Refills: 4 | Status: SHIPPED | OUTPATIENT
Start: 2022-05-03

## 2022-05-03 RX ORDER — LISINOPRIL 5 MG/1
5 TABLET ORAL DAILY
Qty: 90 TABLET | Refills: 4 | Status: SHIPPED | OUTPATIENT
Start: 2022-05-03 | End: 2022-09-27 | Stop reason: SINTOL

## 2022-05-03 RX ORDER — ATORVASTATIN CALCIUM 20 MG/1
20 TABLET, FILM COATED ORAL DAILY
Qty: 90 TABLET | Refills: 4 | Status: SHIPPED | OUTPATIENT
Start: 2022-05-03

## 2022-05-03 RX ORDER — CARVEDILOL 25 MG/1
TABLET ORAL
Qty: 180 TABLET | Refills: 4 | Status: SHIPPED | OUTPATIENT
Start: 2022-05-03

## 2022-05-03 RX ORDER — FUROSEMIDE 20 MG/1
TABLET ORAL
Qty: 180 TABLET | Refills: 4 | Status: SHIPPED | OUTPATIENT
Start: 2022-05-03 | End: 2022-08-16 | Stop reason: SDUPTHER

## 2022-05-03 NOTE — PROGRESS NOTES
[facility]  Cardiac Consult     Referring Provider:  Jen Ocasio MD     Chief Complaint   Patient presents with    3 Month Follow-Up    Coronary Artery Disease    Hypertension    Hyperlipidemia        History of Present Illness:  79 y/o male formally followed by Dr. Shelby Araiza seen for f/u for CAD, s/p CABG, hyperlipidemia and uncontrolled HTN. He feels well. He denies anginal type chest discomfort. He has some discomfort in left lower chest when he awakes in the morning that resolves with belching. \"\"I have an acid stomach\". He denies any exertional chest discomfort. He does not take any meds for GERD. He was found to have an 18 beat run of NSVT on pacer check. Due to this an ECHO and myoview were obtained showing normal LV function and normal perfusion (EF=69%). He was seen by EP and options discussed including cardiac cath. We discussed options. I reviewed pacer checks with Dr. Geronimo Daniel. 8 episodes NSVT in 24 months. Jan 2021 18 beats, Aug 2020 6 beats March 220 4 beats. All asymptomatic. Due to his age, abnormal renal function and overall risk we have deferred on cardiac cath. He was recently seen and HR stayed at 60 even with exercise. He was \"weak\" walking. Pacer changed to rate responsive and now increases with exercise. He does not feel any different however. He has abdominal pressure and belching with food. Not with exertion. Resolves with TUMS and belching. Unchanged. Not taking his PPI    Past Medical History:   has a past medical history of Anxiety, Arthritis, CAD (coronary artery disease), Cancer ([address] GERD (gastroesophageal reflux disease), Hyperlipidemia, and Hypertension. Surgical History:   has a past surgical history that includes Appendectomy; Tonsillectomy; Knee arthroscopy; TURP; Coronary angioplasty with stent; Carpal tunnel release; TURP (12-); Cardiac surgery (2007); Prostate surgery; Mohs surgery; and pacemaker placement (10/04/2018).      Social History:   reports that he quit smoking about 30 years ago. He has a 45.00 pack-year smoking history. He has never used smokeless tobacco. He reports current alcohol use. He reports that he does not use drugs. Family History:  family history includes Cancer in his father; Diabetes in his father; Heart Disease in his mother. Allergies:  Penicillins     Home Medications:  Prior to Visit Medications    Medication Sig Taking? Authorizing Provider   hydrALAZINE (APRESOLINE) 100 MG tablet Take 1 tablet by mouth 3 times daily Takes 50 mg in AM, 100 mg at noon and 100 mg at night. Yes Henri Miles MD   hydrALAZINE (APRESOLINE) 100 MG tablet Take 1 tablet by mouth 3 times daily Takes 50 mg in AM, 100 mg at noon and 100 mg at night. Yes Henri Miles MD   lisinopril (PRINIVIL;ZESTRIL) 5 MG tablet Take 1 tablet by mouth daily Yes Henri Miles MD   NIFEdipine (ADALAT CC) 60 MG extended release tablet Take 1 tablet by mouth daily Yes Henri Miles MD   omeprazole (PRILOSEC) 20 MG delayed release capsule TAKE 1 CAPSULE BY MOUTH EVERY DAY IN THE [facility] Hose Yes Leo Connelly MD   tamsulosin (FLOMAX) 0.4 MG capsule TAKE 1 CAPSULE BY MOUTH EVERY DAY Yes Henri Miles MD   atorvastatin (LIPITOR) 20 MG tablet Take 1 tablet by mouth daily Yes Leo Connelly MD   furosemide (LASIX) 20 MG tablet TAKE 1 TABLET BY MOUTH ONCE A DAY, CAN TAKE 1 TAB IN EVENING IF SWELLING Yes Leo Connelly MD   carvedilol (COREG) 25 MG tablet TAKE 1 TABLET BY MOUTH TWICE A DAY Yes Leo Connelly MD   glimepiride (AMARYL) 2 MG tablet Take 2 mg by mouth every morning (before breakfast) Yes Historical Provider, MD   aspirin 81 MG tablet Take 81 mg by mouth nightly  Yes Historical Provider, MD   alprazolam (XANAX) 0.25 MG tablet Take 0.25 mg by mouth 3 times daily as needed. TAKES 1/2 TAB DAILY OR TWO TIMES A DAY AS NEEDED Yes Historical Provider, MD       [x] Medications and dosages reviewed.     ROS:  [x]Full ROS obtained and negative except as mentioned in HPI       Physical Examination:    Vitals:    05/03/22 1057   BP: 130/72   Pulse: 80   SpO2: 96%      /72 (Site: Left Upper Arm, Position: Sitting, Cuff Size: Large Adult)   Pulse 80   Ht 5' 10\" (1.778 m)   Wt 256 lb (116.1 kg)   SpO2 96%   BMI 36.73 kg/m²        · GENERAL: Elderly overweight male in NAD  · NEUROLOGICAL: Alert and oriented, no motor abnormalities  · PSYCH: Calm affect  · SKIN: Warm and dry, No rash  · HEENT: Normocephalic, Sclera non-icteric, mucus membranes moist  · NECK: supple, JVP normal  · CAROTID: Normal upstroke, no bruits  · CARDIAC: Normal PMI, regular rate and rhythm, normal S1S2, No murmur, rub, or gallop  · RESPIRATORY: Normal respiratory effort, clear to auscultation bilaterally  · EXTREMITIES: No LE edema  · MUSCULOSKELETAL: No joint swelling or tenderness, no chest wall tenderness  · GASTROINTESTINAL: normal bowel sounds, soft, non-tender, no bruit      MYOVIEW 1/2021  Normal myocardial perfusion. Normal LV size and systolic function. EF=67%. ECHO 1/2021   -Technically difficult examination.   -Definity was administered.   -Suboptimal image quality.   -Normal left ventricle size, wall thickness, and systolic function with an   estimated ejection fraction of 55%.   -No regional wall motion abnormalities are seen. -E/e=7.6.   -Trivial tricuspid regurgitation. PASP of 28 mmHg. LABS     Creat= 1.61      ASSESSMENT:      HTN:  /72 (Site: Left Upper Arm, Position: Sitting, Cuff Size: Large Adult)   Pulse 80   Ht 5' 10\" (1.778 m)   Wt 256 lb (116.1 kg)   SpO2 96%   BMI 36.73 kg/m²   Well controlled. Continue current meds including lisinopril and coreg    CAD:  Continue medical therapy. Stable without clear angina  Myoview 1/2021 normal.  Abdominal and some chest pressure with eating. Sounds GI. If he notices exertional symptoms he will need cath, but concern about risk with CRI.    Myoview 8/2018 fixed inferior scar vs attenuation. S/P CABG 2007  Negative GXT [place] 2015  Normal LV function 2015 as well on ECHO  myoview 2012 \"inferior wall attenuation\"      Hyperlipidemia:  LDL=69, controlled  Continue lipitor      Sleep Apnea: On CPAP. Controlled. Continue. He just had machine re calibrated. Bradycardia:  Resolved s/p pacer. Now rate responsive    Claudication:  PCI left leg 2019  Did not f/u with Dr. Rodriguez Gonzalez stable. Current symptoms sound more c/w knee issues    Epistaxis  Resolved off of plavix  Follow    CRI:  Creat 1.6  Slightly improved. Follow  Follows with nephrology    NSVT:  Stable. Follow. Occasional short NSVT on pacer. Follow  Stress and myoview normal  Discussed with pt and Dr. Rachael De Souza. He is at increased risk with invasive procedures due to age, CRI and prior CABG making it difficult to limit contrast  At this time with no symptoms and normal LV will follow.  If he has increased episodes of NSVT will need to cath    Chest Pain:  Sounds GI  Recent negative stress  He stopped prilosec and will restart    Plan:  Same meds  Needs more walking  Call for any exertional chest pressure and will need cath  Otherwise, f/u 4 months      Thank you for allowing me to participate in the care of this individual.      Janice Ochoa M.D., [facility] [place]

## 2022-05-11 ENCOUNTER — NURSE ONLY (OUTPATIENT)
Dept: CARDIOLOGY CLINIC | Age: 87
End: 2022-05-11
Payer: MEDICARE

## 2022-05-11 DIAGNOSIS — Z95.0 PACEMAKER: ICD-10-CM

## 2022-05-11 DIAGNOSIS — R00.1 SYMPTOMATIC BRADYCARDIA: ICD-10-CM

## 2022-05-11 PROCEDURE — 93296 REM INTERROG EVL PM/IDS: CPT | Performed by: INTERNAL MEDICINE

## 2022-05-11 PROCEDURE — 93294 REM INTERROG EVL PM/LDLS PM: CPT | Performed by: INTERNAL MEDICINE

## 2022-05-11 NOTE — PROGRESS NOTES
We received remote transmission from patient's monitor at home. Transmission shows normal sensing and pacing function. Noted NSVT. Pt is on Coreg. EP physician will review. See interrogation under cardiology tab in the [address] [facility] [place] [address] field for more details.

## 2022-08-10 ENCOUNTER — NURSE ONLY (OUTPATIENT)
Dept: CARDIOLOGY CLINIC | Age: 87
End: 2022-08-10
Payer: MEDICARE

## 2022-08-10 DIAGNOSIS — I49.5 TACHY-BRADY SYNDROME (HCC): Primary | ICD-10-CM

## 2022-08-10 DIAGNOSIS — Z95.0 PACEMAKER: ICD-10-CM

## 2022-08-10 PROCEDURE — 93294 REM INTERROG EVL PM/LDLS PM: CPT | Performed by: INTERNAL MEDICINE

## 2022-08-10 PROCEDURE — 93296 REM INTERROG EVL PM/IDS: CPT | Performed by: INTERNAL MEDICINE

## 2022-08-10 NOTE — PROGRESS NOTES
We received remote transmission from patient's monitor at home. Transmission shows normal sensing and pacing function. EP physician will review. See interrogation under cardiology tab in the [address] [facility] [address] field for more details. Noted NSVT. Pt is on Coreg.     2.2% (MVP On)  AP 94.8%    End of 91-day monitoring period 8-10-22.

## 2022-09-15 ENCOUNTER — OFFICE VISIT (OUTPATIENT)
Dept: CARDIOLOGY CLINIC | Age: 87
End: 2022-09-15
Payer: MEDICARE

## 2022-09-15 VITALS
HEIGHT: 70 IN | SYSTOLIC BLOOD PRESSURE: 122 MMHG | HEART RATE: 78 BPM | OXYGEN SATURATION: 98 % | BODY MASS INDEX: 35.65 KG/M2 | WEIGHT: 249 LBS | DIASTOLIC BLOOD PRESSURE: 60 MMHG

## 2022-09-15 DIAGNOSIS — I25.10 CORONARY ARTERY DISEASE INVOLVING NATIVE CORONARY ARTERY OF NATIVE HEART WITHOUT ANGINA PECTORIS: Primary | ICD-10-CM

## 2022-09-15 DIAGNOSIS — E78.2 MIXED HYPERLIPIDEMIA: ICD-10-CM

## 2022-09-15 DIAGNOSIS — I73.9 CLAUDICATION OF BOTH LOWER EXTREMITIES (HCC): ICD-10-CM

## 2022-09-15 DIAGNOSIS — I10 PRIMARY HYPERTENSION: ICD-10-CM

## 2022-09-15 DIAGNOSIS — G47.30 SLEEP APNEA, UNSPECIFIED TYPE: ICD-10-CM

## 2022-09-15 DIAGNOSIS — N18.30 STAGE 3 CHRONIC KIDNEY DISEASE, UNSPECIFIED WHETHER STAGE 3A OR 3B CKD (HCC): ICD-10-CM

## 2022-09-15 DIAGNOSIS — I47.29 NSVT (NONSUSTAINED VENTRICULAR TACHYCARDIA): ICD-10-CM

## 2022-09-15 PROCEDURE — 99214 OFFICE O/P EST MOD 30 MIN: CPT | Performed by: INTERNAL MEDICINE

## 2022-09-15 PROCEDURE — 1123F ACP DISCUSS/DSCN MKR DOCD: CPT | Performed by: INTERNAL MEDICINE

## 2022-09-15 RX ORDER — FAMOTIDINE 20 MG/1
TABLET, FILM COATED ORAL
COMMUNITY
Start: 2022-07-20

## 2022-09-15 NOTE — PROGRESS NOTES
[address]  Cardiac Consult     Referring Provider:  Toby Lopez MD     Chief Complaint   Patient presents with    Coronary Artery Disease    Hypertension    Hyperlipidemia        History of Present Illness:  81 y/o male formally followed by Dr. Kanchan Carlisle seen for f/u for CAD, s/p CABG, hyperlipidemia and uncontrolled HTN. He feels well. He denies anginal type chest discomfort. He has some discomfort in left lower chest when he awakes in the morning that resolves with belching. \"\"I have an acid stomach\". He denies any exertional chest discomfort. He does not take any meds for GERD. He was found to have an 18 beat run of NSVT on pacer check. Due to this an ECHO and myoview were obtained showing normal LV function and normal perfusion (EF=69%). He was seen by EP and options discussed including cardiac cath. We discussed options. I reviewed pacer checks with Dr. Arnulfo Richards. 8 episodes NSVT in 24 months. Jan 2021 18 beats, Aug 2020 6 beats March 220 4 beats. All asymptomatic. Due to his age, abnormal renal function and overall risk we have deferred on cardiac cath. He is doing well. No chest pain. Taking Pepcid BID. Mild LE edema. Lasix decreased by nephrology due to higher creat. Left leg > right due to prior vein harvest in that leg. Past Medical History:   has a past medical history of Anxiety, Arthritis, CAD (coronary artery disease), [address] [address] GERD (gastroesophageal reflux disease), Hyperlipidemia, and Hypertension. Surgical History:   has a past surgical history that includes Appendectomy; Tonsillectomy; Knee arthroscopy; TURP; Coronary angioplasty with stent; Carpal tunnel release; TURP (12-); Cardiac surgery (2007); Prostate surgery; Mohs surgery; and pacemaker placement (10/04/2018). Social History:   reports that he quit smoking about 30 years ago. He has a 45.00 pack-year smoking history. He has never used smokeless tobacco. He reports current alcohol use.  He reports that he does not use drugs. Family History:  family history includes Cancer in his father; Diabetes in his father; Heart Disease in his mother. Allergies:  Penicillins     Home Medications:  Prior to Visit Medications    Medication Sig Taking? Authorizing Provider   famotidine (PEPCID) 20 MG tablet TAKE 1 TABLET BY MOUTH TWICE A DAY Yes Historical Provider, MD   furosemide (LASIX) 20 MG tablet Take 1 tablet by mouth every 48 hours TAKE 1 TABLET BY MOUTH ONCE A DAY, CAN TAKE 1 TAB IN EVENING IF SWELLING Yes Sana Tamez MD   hydrALAZINE (APRESOLINE) 100 MG tablet TAKE 1/2 TABLET IN THE MORNING, 1 TABLET AT NOON & 1 TABLET AT NIGHT. Yes Sana Tamez MD   lisinopril (PRINIVIL;ZESTRIL) 5 MG tablet Take 1 tablet by mouth daily Yes Shane Howe MD   NIFEdipine (ADALAT CC) 60 MG extended release tablet Take 1 tablet by mouth daily Yes Shane Howe MD   atorvastatin (LIPITOR) 20 MG tablet Take 1 tablet by mouth daily Yes Shane Howe MD   carvedilol (COREG) 25 MG tablet TAKE 1 TABLET BY MOUTH TWICE A DAY Yes Shane Howe MD   tamsulosin (FLOMAX) 0.4 MG capsule TAKE 1 CAPSULE BY MOUTH EVERY DAY Yes Sana Tamez MD   glimepiride (AMARYL) 2 MG tablet Take 2 mg by mouth every morning (before breakfast) Yes Historical Provider, MD   aspirin 81 MG tablet Take 81 mg by mouth nightly  Yes Historical Provider, MD   alprazolam (XANAX) 0.25 MG tablet Take 0.25 mg by mouth 3 times daily as needed. TAKES 1/2 TAB DAILY OR TWO TIMES A DAY AS NEEDED  Historical Provider, MD       [x] Medications and dosages reviewed.     ROS:  [x]Full ROS obtained and negative except as mentioned in HPI       Physical Examination:    Vitals:    09/15/22 1103   BP: 122/60   Pulse: 78   SpO2: 98%      /60 (Site: Left Upper Arm, Position: Sitting, Cuff Size: Large Adult)   Pulse 78   Ht 5' 10\" (1.778 m)   Wt 249 lb (112.9 kg)   SpO2 98%   BMI 35.73 kg/m²        GENERAL: Elderly overweight male in NAD  NEUROLOGICAL: Alert and oriented, no motor abnormalities  PSYCH: Calm affect  SKIN: Warm and dry, No rash  HEENT: Normocephalic, Sclera non-icteric, mucus membranes moist  NECK: supple, JVP normal  CAROTID: Normal upstroke, no bruits  CARDIAC: Normal PMI, regular rate and rhythm, normal S1S2, No murmur, rub, or gallop  RESPIRATORY: Normal respiratory effort, clear to auscultation bilaterally  EXTREMITIES: trace LE edema  MUSCULOSKELETAL: No joint swelling or tenderness, no chest wall tenderness  GASTROINTESTINAL: normal bowel sounds, soft, non-tender, no bruit      MYOVIEW 1/2021  Normal myocardial perfusion. Normal LV size and systolic function. EF=67%. ECHO 1/2021   -Technically difficult examination.   -Definity was administered.   -Suboptimal image quality.   -Normal left ventricle size, wall thickness, and systolic function with an   estimated ejection fraction of 55%.   -No regional wall motion abnormalities are seen. -E/e=7.6.   -Trivial tricuspid regurgitation. PASP of 28 mmHg. ASSESSMENT:      HTN:  /60 (Site: Left Upper Arm, Position: Sitting, Cuff Size: Large Adult)   Pulse 78   Ht 5' 10\" (1.778 m)   Wt 249 lb (112.9 kg)   SpO2 98%   BMI 35.73 kg/m²   Controlled. Continue current meds including lisinopril and coreg    CAD:  Continue medical therapy. Stable without angina  Myoview 1/2021 normal.  Abdominal and some chest pressure with eating. Sounds GI. If he notices exertional symptoms he will need cath, but concern about risk with CRI. Myoview 8/2018 fixed inferior scar vs attenuation. S/P CABG 2007  Negative GXT [place] 2015  Normal LV function 2015 as well on ECHO  myoview 2012 \"inferior wall attenuation\"      Hyperlipidemia:  LDL=69, controlled  Continue lipitor      Sleep Apnea: On CPAP. Controlled. Continue CPAP    Bradycardia:  Resolved s/p pacer. Now rate responsive    Claudication:  PCI left leg 2019  Did not f/u with Dr. Latasha Denney stable.  Current symptoms sound more c/w knee issues    Epistaxis  Resolved off of plavix  Follow    CRI:  Creat 1.8  Slightly worse. Follows with nephrology    NSVT:  Stable. Follow. Occasional short NSVT on pacer. Last check with 2 episodes 1 sec. Follow  Stress and myoview normal  Discussed with pt and Dr. Solomon Main. He is at increased risk with invasive procedures due to age, CRI and prior CABG making it difficult to limit contrast  At this time with no symptoms and normal LV will follow.  If he has increased episodes of NSVT will need to cath    Chest Pain:  Sounds GI  Resolved    Plan:  Stable cardiac status  Same meds  F/u nephrology  See me 6 months      Thank you for allowing me to participate in the care of this individual.      Nathalie Muhammad M.D., [address] [facility]

## 2022-11-09 ENCOUNTER — NURSE ONLY (OUTPATIENT)
Dept: CARDIOLOGY CLINIC | Age: 87
End: 2022-11-09
Payer: MEDICARE

## 2022-11-09 DIAGNOSIS — I49.5 TACHY-BRADY SYNDROME (HCC): Primary | ICD-10-CM

## 2022-11-09 DIAGNOSIS — Z95.0 PACEMAKER: ICD-10-CM

## 2022-11-09 PROCEDURE — 93296 REM INTERROG EVL PM/IDS: CPT | Performed by: INTERNAL MEDICINE

## 2022-11-09 PROCEDURE — 93294 REM INTERROG EVL PM/LDLS PM: CPT | Performed by: INTERNAL MEDICINE

## 2022-11-09 NOTE — PROGRESS NOTES
Dr Valerie Venegas would namita appt with patient. Scheduled appt with pts wife for 11/17 at  in [facility].

## 2022-11-17 ENCOUNTER — OFFICE VISIT (OUTPATIENT)
Dept: CARDIOLOGY CLINIC | Age: 87
End: 2022-11-17
Payer: MEDICARE

## 2022-11-17 VITALS
HEIGHT: 70 IN | WEIGHT: 251.8 LBS | OXYGEN SATURATION: 98 % | SYSTOLIC BLOOD PRESSURE: 126 MMHG | BODY MASS INDEX: 36.05 KG/M2 | HEART RATE: 82 BPM | DIASTOLIC BLOOD PRESSURE: 68 MMHG

## 2022-11-17 DIAGNOSIS — K21.9 GASTROESOPHAGEAL REFLUX DISEASE, UNSPECIFIED WHETHER ESOPHAGITIS PRESENT: ICD-10-CM

## 2022-11-17 DIAGNOSIS — I47.29 NSVT (NONSUSTAINED VENTRICULAR TACHYCARDIA): ICD-10-CM

## 2022-11-17 DIAGNOSIS — I10 PRIMARY HYPERTENSION: ICD-10-CM

## 2022-11-17 DIAGNOSIS — I48.0 PAROXYSMAL ATRIAL FIBRILLATION (HCC): Primary | ICD-10-CM

## 2022-11-17 DIAGNOSIS — E78.2 MIXED HYPERLIPIDEMIA: ICD-10-CM

## 2022-11-17 DIAGNOSIS — N18.30 STAGE 3 CHRONIC KIDNEY DISEASE, UNSPECIFIED WHETHER STAGE 3A OR 3B CKD (HCC): ICD-10-CM

## 2022-11-17 DIAGNOSIS — I25.10 CORONARY ARTERY DISEASE INVOLVING NATIVE CORONARY ARTERY OF NATIVE HEART WITHOUT ANGINA PECTORIS: ICD-10-CM

## 2022-11-17 DIAGNOSIS — I73.9 CLAUDICATION OF BOTH LOWER EXTREMITIES (HCC): ICD-10-CM

## 2022-11-17 PROCEDURE — 93000 ELECTROCARDIOGRAM COMPLETE: CPT | Performed by: INTERNAL MEDICINE

## 2022-11-17 PROCEDURE — 99214 OFFICE O/P EST MOD 30 MIN: CPT | Performed by: INTERNAL MEDICINE

## 2022-11-17 PROCEDURE — 1123F ACP DISCUSS/DSCN MKR DOCD: CPT | Performed by: INTERNAL MEDICINE

## 2022-11-17 RX ORDER — NIFEDIPINE 60 MG/1
60 TABLET, FILM COATED, EXTENDED RELEASE ORAL DAILY
Qty: 90 TABLET | Refills: 4 | Status: CANCELLED | OUTPATIENT
Start: 2022-11-17

## 2022-11-17 RX ORDER — CARVEDILOL 25 MG/1
TABLET ORAL
Qty: 180 TABLET | Refills: 4 | Status: CANCELLED | OUTPATIENT
Start: 2022-11-17

## 2022-11-17 RX ORDER — ATORVASTATIN CALCIUM 20 MG/1
20 TABLET, FILM COATED ORAL DAILY
Qty: 90 TABLET | Refills: 4 | Status: CANCELLED | OUTPATIENT
Start: 2022-11-17

## 2022-11-17 NOTE — PROGRESS NOTES
[facility]  Cardiac Consult     Referring Provider:  Eliza Closs, MD     Chief Complaint   Patient presents with    Coronary Artery Disease    Hypertension    Shortness of Breath        History of Present Illness:  81 y/o male formally followed by Dr. Rodríguez Stands seen for f/u for CAD, s/p CABG, hyperlipidemia and uncontrolled HTN. He feels well. He denies anginal type chest discomfort. He has some discomfort in left lower chest when he awakes in the morning that resolves with belching. \"\"I have an acid stomach\". He denies any exertional chest discomfort. He does not take any meds for GERD. He was found to have an 18 beat run of NSVT on pacer check. Due to this an ECHO and myoview were obtained showing normal LV function and normal perfusion (EF=69%). He was seen by EP and options discussed including cardiac cath. We discussed options. I reviewed pacer checks with Dr. Александр Cartwright. 8 episodes NSVT in 24 months. Jan 2021 18 beats, Aug 2020 6 beats March 220 4 beats. All asymptomatic. Due to his age, abnormal renal function and overall risk we have deferred on cardiac cath. He has been feeling better. Less \"heart burn\" taking pepcid BID. Only occurs laying in bed at night. No longer gets up and \"vomits acid\". He was found to have paroxysmal atrial fib on pacer check for several hours. Asymptomatic. Past Medical History:   has a past medical history of Anxiety, Arthritis, CAD (coronary artery disease), Cancer ([address] GERD (gastroesophageal reflux disease), Hyperlipidemia, and Hypertension. Surgical History:   has a past surgical history that includes Appendectomy; Tonsillectomy; Knee arthroscopy; TURP; Coronary angioplasty with stent; Carpal tunnel release; TURP (12-); Cardiac surgery (2007); Prostate surgery; Mohs surgery; and pacemaker placement (10/04/2018). Social History:   reports that he quit smoking about 30 years ago. His smoking use included cigarettes.  He has a 45.00 pack-year smoking history. He has never used smokeless tobacco. He reports current alcohol use. He reports that he does not use drugs. Family History:  family history includes Cancer in his father; Diabetes in his father; Heart Disease in his mother. Allergies:  Penicillins     Home Medications:  Prior to Visit Medications    Medication Sig Taking? Authorizing Provider   furosemide (LASIX) 20 MG tablet Take 1 tablet by mouth See Admin Instructions take as needed, for leg swelling, 2-3 lbs weight gain Yes Analilia Taylor MD   tamsulosin (FLOMAX) 0.4 MG capsule Take 1 capsule by mouth at bedtime Yes Analilia Taylor MD   famotidine (PEPCID) 20 MG tablet TAKE 1 TABLET BY MOUTH TWICE A DAY Yes Historical Provider, MD   hydrALAZINE (APRESOLINE) 100 MG tablet TAKE 1/2 TABLET IN THE MORNING, 1 TABLET AT NOON & 1 TABLET AT NIGHT. Yes Analilia Taylor MD   NIFEdipine (ADALAT CC) 60 MG extended release tablet Take 1 tablet by mouth daily Yes Johnnie Reno MD   atorvastatin (LIPITOR) 20 MG tablet Take 1 tablet by mouth daily Yes Johnnie Reno MD   carvedilol (COREG) 25 MG tablet TAKE 1 TABLET BY MOUTH TWICE A DAY Yes Johnnie Reno MD   glimepiride (AMARYL) 2 MG tablet Take 2 mg by mouth every morning (before breakfast) Yes Historical Provider, MD   aspirin 81 MG tablet Take 81 mg by mouth nightly  Yes Historical Provider, MD   alprazolam (XANAX) 0.25 MG tablet Take 0.25 mg by mouth 3 times daily as needed. TAKES 1/2 TAB DAILY OR TWO TIMES A DAY AS NEEDED Yes Historical Provider, MD       [x] Medications and dosages reviewed.     ROS:  [x]Full ROS obtained and negative except as mentioned in HPI       Physical Examination:    Vitals:    11/17/22 0840   BP: 126/68   Pulse: 82   SpO2: 98%      /68 (Site: Left Upper Arm, Position: Sitting, Cuff Size: Large Adult)   Pulse 82   Ht 5' 10\" (1.778 m)   Wt 251 lb 12.8 oz (114.2 kg)   SpO2 98%   BMI 36.13 kg/m²        GENERAL: Elderly overweight male in NAD  NEUROLOGICAL: Alert and oriented, no motor abnormalities  PSYCH: Calm affect  SKIN: Warm and dry, No rash  HEENT: Normocephalic, Sclera non-icteric, mucus membranes moist  NECK: supple, JVP normal  CAROTID: Normal upstroke, no bruits  CARDIAC: Normal PMI, regular rate and rhythm, normal S1S2, No murmur, rub, or gallop  RESPIRATORY: Normal respiratory effort, clear to auscultation bilaterally  EXTREMITIES: trace LE edema  MUSCULOSKELETAL: No joint swelling or tenderness, no chest wall tenderness  GASTROINTESTINAL: normal bowel sounds, soft, non-tender, no bruit      MYOVIEW 1/2021  Normal myocardial perfusion. Normal LV size and systolic function. EF=67%. ECHO 1/2021   -Technically difficult examination.   -Definity was administered.   -Suboptimal image quality.   -Normal left ventricle size, wall thickness, and systolic function with an   estimated ejection fraction of 55%.   -No regional wall motion abnormalities are seen. -E/e=7.6.   -Trivial tricuspid regurgitation. PASP of 28 mmHg. EKG:  NSR    ASSESSMENT:      HTN:  /68 (Site: Left Upper Arm, Position: Sitting, Cuff Size: Large Adult)   Pulse 82   Ht 5' 10\" (1.778 m)   Wt 251 lb 12.8 oz (114.2 kg)   SpO2 98%   BMI 36.13 kg/m²   Well controlled. Continue current meds including lisinopril and coreg    CAD:  Continue medical therapy. Remains stable without angina  Myoview 1/2021 normal.  Abdominal and some chest pressure with eating. Sounds GI. If he notices exertional symptoms he will need cath, but concern about risk with CRI. Myoview 8/2018 fixed inferior scar vs attenuation. S/P CABG 2007  Negative GXT [place] 2015  Normal LV function 2015 as well on ECHO  myoview 2012 \"inferior wall attenuation\"      Hyperlipidemia:  LDL=69 then 13, controlled  Continue lipitor      Sleep Apnea: On CPAP. Controlled. Continue CPAP    Bradycardia:  Resolved s/p pacer.    Now rate responsive  Improved    Claudication:  PCI left leg 2019  Did not f/u with Dr. Kane Lacy stable. Current symptoms sound more c/w knee issues    Epistaxis  Resolved off of plavix  Follow with starting eliquis    CRI:  Creat 1.57  Slightly improved. Follows with nephrology    NSVT:  Stable. Follow. Occasional short NSVT on pacer. Last check with 2 episodes 1 sec. Follow  Stress and myoview normal  Discussed with pt and Dr. Downing Forward. He is at increased risk with invasive procedures due to age, CRI and prior CABG making it difficult to limit contrast  At this time with no symptoms and normal LV will follow.  If he has increased episodes of NSVT will need to cath    Chest Pain:  Sounds GI  Resolved    Afib:  PAF  High CHADS2-Vasc=6 (CHF, HTN, AGE x 2, diabetes, Vasc)  Start eliquis 2.5 BID  Stop ASA  Monitor for bleeding  Needs to See GI due to GERD    Plan:  Eliquis 2.5 BID  Stop ASA  GI eval  F/u 6 weeks with labs      Thank you for allowing me to participate in the care of this individual.      Audra Enriquez M.D., [facility] [place]

## 2022-11-17 NOTE — PATIENT INSTRUCTIONS
Stop Aspirin  Start Eliquis 2.5 mg twice a day  Notify us of any bleeding in nose, gums, urine, or stool  Can try taking Benadryl at night    Recommend seeing GI doctor  [facility] MD  [address] [place] [address], [address] [facility], [address] [place] [address] Road  Phone: [phone]  Fax: [phone]    Referral to electrophysiologist for Atrial fib/ discuss the possibility of The Watchman Procedure  Follow up in January with labs a few days prior

## 2022-12-16 NOTE — PROGRESS NOTES
[address]   Electrophysiology Follow up   Date: 12/20/2022  I had the privilege of visiting Vickey Ragland in the office. CC: PAF  HPI: Vickey Ragland is a 80 y.o. male  with pmh CAD s/p CABG 2007, history of PCI, HTN,  HLD, HFpEF, CKD and symptomatic bradycardia. 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 Had a well care visit 01/07/2021 and complained of episodes of significant lightheadedness. Holter monitor was ordered, which showed NSVT. IC evaluation for possible LHC on 2/19/2021. Recommended monitoring for increased NSVT before proceeding with LHC d/t CRI and previous CABG. Judy Reyes presents to the office in follow up. He states that he is unaware of his atrial fibrillation. He states he is only taking his eliquis once a day d/t his recent biopsy on his skin. He had trouble stopping the bleeding so he changed it to once a day for a couple days but plans on resuming twice a day dosage. Patient denies lightheadedness, dizziness, chest pain, palpitations, orthopnea, edema, presyncope or syncope. Assessment and plan:   -PAF   Denies symptoms with episodes of atrial fibrillation   Compliant on his Bipap   Encouraged continued risk factor modification    ECG today shows Sinus rhythm with PVC's    0.2% AT/AF burden per device interrogation today. Longest 8 hours and 15 minutes, last 9/29/2022   Patient has a [id number] Score of at least 5 (HTN, CAD, age3, DM) on eliquis 2.5 mg BID, has been only taking this once a day and he will resume twice a day dosage. Continue low dose eliquis d/t CRI and Cr >1.5 age [de-identified] y.o. Continue coreg 25 mg BID for rate control     Discussed other options including antiarrhythmics, ablation, etc if his Afib burden increases. He is asymptomatic and has low burden.  Continue monitoring.     -Brief NSVT   Noted on device interrogation today Monitored VT 11/30/2022 for 21 seconds    Was asymptomatic with episodes   Myoview 1/2021, not a good candidate for cath d/t CRI   On carvedilol 25 mg BID    -Tachy Chidi/Symptomatic bradycardia   s/p dual chamber PPM 10/4/2018  The CIED was interrogated and programmed and I supervised and reviewed all the data. All findings and changes are in device interrogation sheet and reflect my personal interpretation and changes and is scanned to Epic. 9.2 years remaining, AP 93.4% ,  2.7%   0.2% AT/AF burden per device interrogation today. Presenting APVS @ 70 BPM   Underlying SB @ 42 BPM    -Epistaxis   Occurred on Plavix   Tolerating eliquis 2.5 mg    Briefly discussed watchman implantation if he has recurrent issues     -CAD   Myoview 1/2021, not a good candidate for cath d/t CRI   Managed by Jillian Fernández   CABG 2007   Recent stress test was normal and EF is normal.     -SAUD   Complaint on BiPAP   Follow[facility] medicine at [facility]     -HTN  Controlled: 126/70  Followed by nephrology, can consider increasing coreg to 50 mg BID if nephrology agrees. He will follow up with nephrology  BP goal <130/80  Home BP monitoring encouraged, printed information provided on how to accurately measure BP at home. Counseled to follow a low salt diet to assure blood pressure remains controlled for cardiovascular risk factor modification. The patient is counseled to get regular exercise 3-5 times per week and maintain a healthy weight reduce cardiovascular risk factors. -Obesity Body mass index is 35.58 kg/m². Excessive weight is complicating assessment and treatment. It is placing patient at risk for multiple co-morbidities as well as early death and contributing to the patient's presentation.    discussed weight management with diet and exercise          Patient Active Problem List    Diagnosis Date Noted    BPH (benign prostatic hyperplasia) 12/20/2010    Paroxysmal atrial fibrillation ([address] 11/17/2022    NSVT (nonsustained ventricular tachycardia) 02/19/2021    Diverticulitis 11/21/2019    CKD (chronic kidney disease), stage III ([facility] 10/14/2019    Proteinuria 10/14/2019    Obesity, unspecified obesity severity, unspecified obesity type 10/14/2019    S/P placement of cardiac pacemaker 10/11/2018    Pacemaker 10/10/2018    Tachy-monroe syndrome ([address]     Sleep apnea 08/02/2018    Symptomatic bradycardia 08/02/2018    Claudication of both lower extremities ([facility] 05/25/2018    Chronic diastolic congestive heart failure ([facility] 03/15/2018    Asymptomatic bilateral carotid artery stenosis 06/27/2017    Gastroesophageal reflux disease without esophagitis 06/27/2017    Generalized anxiety disorder 06/27/2017    History of colonic polyps 06/27/2017    History of coronary artery bypass graft 06/27/2017    History of coronary artery stent placement 06/27/2017    Type 2 diabetes mellitus with complication, without long-term current use of insulin ([facility] 06/27/2017    Hyperlipidemia 10/06/2015    Edema 04/29/2015    S/P total knee arthroplasty 12/03/2014    CAD (coronary artery disease) 12/20/2010    HTN (hypertension) 12/20/2010    OA (osteoarthritis) 12/20/2010     ECG 12/20/22  SR with PVC, QTcH 456,      Echo 1/29/2021   Summary   -Technically difficult examination.   -Definity was administered.   -Suboptimal image quality.   -Normal left ventricle size, wall thickness, and systolic function with an   estimated ejection fraction of 55%.   -No regional wall motion abnormalities are seen. -E/e=7.6.   -Trivial tricuspid regurgitation. PASP of 28 mmHg. Nuclear stress 1/29/2021      Conclusions        Summary    Normal myocardial perfusion. Normal LV size and systolic function. EF=67%. I independently reviewed the cardiac diagnostic studies, ECG and relevant imaging studies.      Lab Results   Component Value Date    LVEF 55 01/29/2021     Lab Results   Component Value Date    TSH 1.64 04/28/2014         Physical Examination:  Vitals:    12/20/22 1320   BP: 126/70   Pulse: 61   SpO2: 97%        Wt Readings from Last 3 Encounters:   12/20/22 248 lb (112.5 kg)   11/17/22 251 lb 12.8 oz (114.2 kg)   11/03/22 244 lb (110.7 kg)       Constitutional: Oriented. No distress. Head: Normocephalic and atraumatic. Mouth/Throat: Oropharynx is clear and moist.   Eyes: Conjunctivae normal. EOM are normal.   Neck: Neck supple. No JVD present. Cardiovascular: Normal rate, regular rhythm, S1&S2. Pulmonary/Chest: Bilateral respiratory sounds. No rhonchi. Abdominal: Soft. No tenderness. + obesity   Musculoskeletal: No tenderness. No edema    Lymphadenopathy: Has no cervical adenopathy. Neurological: Alert and oriented. Follows command, No Gross deficit   Skin: Skin is warm, No rash noted. Psychiatric: Has a normal behavior     Review of System:  [x] Full ROS obtained and negative except as mentioned in HPI    Prior to Admission medications    Medication Sig Start Date End Date Taking?  Authorizing Provider   apixaban (ELIQUIS) 2.5 MG TABS tablet Take 1 tablet by mouth 2 times daily  Patient taking differently: Take 2.5 mg by mouth 2 times daily Taking 1 daily due to bleeding from surg area 11/17/22  Yes Bella Moffett MD   tamsulosin [facility]) 0.4 MG capsule Take 1 capsule by mouth at bedtime 9/27/22  Yes Ramy Alva MD   famotidine (PEPCID) 20 MG tablet TAKE 1 TABLET BY MOUTH TWICE A DAY 7/20/22  Yes Historical Provider, MD   hydrALAZINE (APRESOLINE) 100 MG tablet TAKE 1/2 TABLET IN THE MORNING, 1 TABLET AT NOON & 1 TABLET AT NIGHT. 7/5/22  Yes Ramy Alva MD   NIFEdipine (ADALAT CC) 60 MG extended release tablet Take 1 tablet by mouth daily 5/3/22  Yes Bella Moffett MD   atorvastatin (LIPITOR) 20 MG tablet Take 1 tablet by mouth daily 5/3/22  Yes Bella Moffett MD   carvedilol (COREG) 25 MG tablet TAKE 1 TABLET BY MOUTH TWICE A DAY 5/3/22  Yes Bella Moffett MD   glimepiride (AMARYL) 2 MG tablet Take 2 mg by mouth every morning (before breakfast)   Yes Historical Provider, MD   alprazolam Phyllistine Drape) 0.25 MG tablet Take 0.25 mg by mouth 3 times daily as needed. TAKES 1/2 TAB DAILY OR TWO TIMES A DAY AS NEEDED 3/29/10  Yes Historical Provider, MD   furosemide (LASIX) 20 MG tablet Take 1 tablet by mouth See Admin Instructions take as needed, for leg swelling, 2-3 lbs weight gain  Patient not taking: Reported on 12/20/2022 11/3/22   David Jeffers MD       Allergies   Allergen Reactions    Penicillins Rash     Other reaction(s): Unknown  Tolerates now       Social History:  Reviewed. reports that he quit smoking about 30 years ago. His smoking use included cigarettes. He has a 45.00 pack-year smoking history. He has never used smokeless tobacco. He reports current alcohol use. He reports that he does not use drugs. Family History:  Reviewed. Reviewed. No family history of SCD. Relevant and available labs, and cardiovascular diagnostics reviewed. Reviewed. I independently reviewed relevant and available cardiac diagnostic tests ECG, CXR, Echo, Stress test, Device interrogation, Holter, CT scan. - The patient is counseled to follow a low salt diet to assure blood pressure remains controlled for cardiovascular risk factor modification.   - The patient is counseled to avoid excess caffeine, and energy drinks as this may exacerbated ectopy and arrhythmia. - The patient is counseled to get regular exercise 3-5 times per week to control cardiovascular risk factors. - The patient is counseled to lose weigt to control cardiovascular risk factors. All questions and concerns were addressed to the patient/family. Alternatives to my treatment were discussed. I have discussed the above stated plan and the patient verbalized understanding and agreed with the plan. Scribe attestation:  This note was scribed in the presence of Anthony Weathers MD by David Yu RN    Physician Attestation: I, Dr. Anthony Weathers, confirm that the scribe's documentation has been prepared under my direction and personally reviewed by me in its entirety. I also confirm that the note above accurately reflects all work, treatment, procedures, and medical decision making performed by me. NOTE: This report was transcribed using voice recognition software. Every effort was made to ensure accuracy, however, inadvertent computerized transcription errors may be present.      Marvin Wilson MD, MPH  [facility]   Office: [phone]  Fax: [phone]

## 2022-12-20 ENCOUNTER — NURSE ONLY (OUTPATIENT)
Dept: CARDIOLOGY CLINIC | Age: 87
End: 2022-12-20
Payer: MEDICARE

## 2022-12-20 ENCOUNTER — OFFICE VISIT (OUTPATIENT)
Dept: CARDIOLOGY CLINIC | Age: 87
End: 2022-12-20
Payer: MEDICARE

## 2022-12-20 VITALS
WEIGHT: 248 LBS | HEIGHT: 70 IN | SYSTOLIC BLOOD PRESSURE: 126 MMHG | OXYGEN SATURATION: 97 % | BODY MASS INDEX: 35.5 KG/M2 | HEART RATE: 61 BPM | DIASTOLIC BLOOD PRESSURE: 70 MMHG

## 2022-12-20 DIAGNOSIS — R00.1 SYMPTOMATIC BRADYCARDIA: ICD-10-CM

## 2022-12-20 DIAGNOSIS — E66.9 OBESITY, UNSPECIFIED OBESITY SEVERITY, UNSPECIFIED OBESITY TYPE: ICD-10-CM

## 2022-12-20 DIAGNOSIS — I48.0 PAROXYSMAL ATRIAL FIBRILLATION (HCC): Primary | ICD-10-CM

## 2022-12-20 DIAGNOSIS — G47.30 SLEEP APNEA, UNSPECIFIED TYPE: ICD-10-CM

## 2022-12-20 DIAGNOSIS — I47.29 NSVT (NONSUSTAINED VENTRICULAR TACHYCARDIA): ICD-10-CM

## 2022-12-20 DIAGNOSIS — I49.5 TACHY-BRADY SYNDROME (HCC): ICD-10-CM

## 2022-12-20 DIAGNOSIS — Z95.0 PACEMAKER: ICD-10-CM

## 2022-12-20 DIAGNOSIS — I10 PRIMARY HYPERTENSION: ICD-10-CM

## 2022-12-20 DIAGNOSIS — I25.10 CORONARY ARTERY DISEASE INVOLVING NATIVE CORONARY ARTERY OF NATIVE HEART WITHOUT ANGINA PECTORIS: ICD-10-CM

## 2022-12-20 PROCEDURE — 99214 OFFICE O/P EST MOD 30 MIN: CPT | Performed by: INTERNAL MEDICINE

## 2022-12-20 PROCEDURE — 1123F ACP DISCUSS/DSCN MKR DOCD: CPT | Performed by: INTERNAL MEDICINE

## 2022-12-20 PROCEDURE — 93280 PM DEVICE PROGR EVAL DUAL: CPT | Performed by: INTERNAL MEDICINE

## 2022-12-20 NOTE — PROGRESS NOTES
Patient comes in for programming evaluation for his pacemaker. All sensing and pacing parameters are within normal range. [id number]  Battery life 9.1 years  AP 93.4%.  2.7%. AT/AF with a 0.2% burden. Last episode noted on 9/29/2022, longest 8 hours 15 minutes. 1 VT monitored episode noted on 11/30/2022 lasting ~21 seconds. 9 NSVT episodes noted. Last on 10/29/2022 longest 3 seconds. Patient remains on Eliquis and Coreg. Today we turn on Atrial Therapies to the [address] settings. Please see interrogation for more detail. Patient will see Dr. Erin Bertrand today and follow up in 3 months in office or remotely.

## 2023-01-09 ENCOUNTER — TELEPHONE (OUTPATIENT)
Dept: CARDIOLOGY CLINIC | Age: 88
End: 2023-01-09

## 2023-01-09 NOTE — TELEPHONE ENCOUNTER
----- Message from Alondra Drake MD sent at 1/9/2023  4:46 PM EST -----  Labs stable. F/u 1/13 as scheduled.     1 [facility]

## 2023-01-13 ENCOUNTER — OFFICE VISIT (OUTPATIENT)
Dept: CARDIOLOGY CLINIC | Age: 88
End: 2023-01-13
Payer: MEDICARE

## 2023-01-13 VITALS
DIASTOLIC BLOOD PRESSURE: 70 MMHG | SYSTOLIC BLOOD PRESSURE: 130 MMHG | BODY MASS INDEX: 35.93 KG/M2 | HEIGHT: 70 IN | HEART RATE: 82 BPM | OXYGEN SATURATION: 99 % | WEIGHT: 251 LBS

## 2023-01-13 DIAGNOSIS — I73.9 CLAUDICATION OF BOTH LOWER EXTREMITIES (HCC): ICD-10-CM

## 2023-01-13 DIAGNOSIS — I10 ESSENTIAL HYPERTENSION: ICD-10-CM

## 2023-01-13 DIAGNOSIS — I48.0 PAROXYSMAL ATRIAL FIBRILLATION (HCC): ICD-10-CM

## 2023-01-13 DIAGNOSIS — I25.10 CORONARY ARTERY DISEASE INVOLVING NATIVE CORONARY ARTERY OF NATIVE HEART WITHOUT ANGINA PECTORIS: Primary | ICD-10-CM

## 2023-01-13 DIAGNOSIS — I10 PRIMARY HYPERTENSION: ICD-10-CM

## 2023-01-13 DIAGNOSIS — I47.29 NSVT (NONSUSTAINED VENTRICULAR TACHYCARDIA): ICD-10-CM

## 2023-01-13 DIAGNOSIS — E78.2 MIXED HYPERLIPIDEMIA: ICD-10-CM

## 2023-01-13 DIAGNOSIS — G47.30 SLEEP APNEA, UNSPECIFIED TYPE: ICD-10-CM

## 2023-01-13 DIAGNOSIS — N18.30 STAGE 3 CHRONIC KIDNEY DISEASE, UNSPECIFIED WHETHER STAGE 3A OR 3B CKD (HCC): ICD-10-CM

## 2023-01-13 PROCEDURE — 1123F ACP DISCUSS/DSCN MKR DOCD: CPT | Performed by: INTERNAL MEDICINE

## 2023-01-13 PROCEDURE — 99214 OFFICE O/P EST MOD 30 MIN: CPT | Performed by: INTERNAL MEDICINE

## 2023-01-13 RX ORDER — ATORVASTATIN CALCIUM 20 MG/1
20 TABLET, FILM COATED ORAL DAILY
Qty: 90 TABLET | Refills: 4 | Status: SHIPPED | OUTPATIENT
Start: 2023-01-13

## 2023-01-13 RX ORDER — NIFEDIPINE 60 MG/1
60 TABLET, FILM COATED, EXTENDED RELEASE ORAL DAILY
Qty: 90 TABLET | Refills: 4 | Status: SHIPPED | OUTPATIENT
Start: 2023-01-13

## 2023-01-13 RX ORDER — HYDRALAZINE HYDROCHLORIDE 100 MG/1
TABLET, FILM COATED ORAL
Qty: 225 TABLET | Refills: 1 | Status: SHIPPED | OUTPATIENT
Start: 2023-01-13

## 2023-01-13 RX ORDER — CARVEDILOL 25 MG/1
TABLET ORAL
Qty: 180 TABLET | Refills: 4 | Status: SHIPPED | OUTPATIENT
Start: 2023-01-13

## 2023-01-13 NOTE — PATIENT INSTRUCTIONS
Okay to hold Eliquis for 2 days prior to skin cancer removal  No med changes  Labs prior to next appt  Follow up in 6 months

## 2023-01-13 NOTE — PROGRESS NOTES
[facility]  Cardiac Consult     Referring Provider:  Rima Richards MD     Chief Complaint   Patient presents with    Follow-up    Coronary Artery Disease    Hypertension    Hyperlipidemia        History of Present Illness:  79 y/o male formally followed by Dr. Sanju Hart seen for f/u for CAD, s/p CABG, hyperlipidemia and uncontrolled HTN. He feels well. He denies anginal type chest discomfort. He has some discomfort in left lower chest when he awakes in the morning that resolves with belching. \"\"I have an acid stomach\". He denies any exertional chest discomfort. He does not take any meds for GERD. He was found to have an 18 beat run of NSVT on pacer check. Due to this an ECHO and myoview were obtained showing normal LV function and normal perfusion (EF=69%). He was seen by EP and options discussed including cardiac cath. We discussed options. I reviewed pacer checks with Dr. Demetris Mariscal. 8 episodes NSVT in 24 months. Jan 2021 18 beats, Aug 2020 6 beats March 220 4 beats. All asymptomatic. Due to his age, abnormal renal function and overall risk we have deferred on cardiac cath. He was found to have paroxysmal atrial fib on pacer check for several hours. Asymptomatic. Started on eliquis 2.5. Tolerating without any bleeding. His indigestion has resolved on pepcid. He does not want to see GI. No angina. Some edema in LLE where vein was harvested for CABG. Past Medical History:   has a past medical history of Anxiety, Arthritis, CAD (coronary artery disease), Cancer ([address] GERD (gastroesophageal reflux disease), Hyperlipidemia, and Hypertension. Surgical History:   has a past surgical history that includes Appendectomy; Tonsillectomy; Knee arthroscopy; TURP; Coronary angioplasty with stent; Carpal tunnel release; TURP (12-); Cardiac surgery (2007); Prostate surgery; Mohs surgery; and pacemaker placement (10/04/2018). Social History:   reports that he quit smoking about 30 years ago. His smoking use included cigarettes. He has a 45.00 pack-year smoking history. He has never used smokeless tobacco. He reports current alcohol use. He reports that he does not use drugs.     Family History:  family history includes Cancer in his father; Diabetes in his father; Heart Disease in his mother.     Allergies:  Penicillins     Home Medications:  Prior to Visit Medications    Medication Sig Taking? Authorizing Provider   apixaban (ELIQUIS) 2.5 MG TABS tablet Take 1 tablet by mouth 2 times daily  Patient taking differently: Take 2.5 mg by mouth 2 times daily Taking 1 daily due to bleeding from surg area Yes Tremayne Cool MD   furosemide (LASIX) 20 MG tablet Take 1 tablet by mouth See Admin Instructions take as needed, for leg swelling, 2-3 lbs weight gain Yes Elias Collins MD   tamsulosin (FLOMAX) 0.4 MG capsule Take 1 capsule by mouth at bedtime Yes Elias Collins MD   famotidine (PEPCID) 20 MG tablet TAKE 1 TABLET BY MOUTH TWICE A DAY Yes Historical Provider, MD   hydrALAZINE (APRESOLINE) 100 MG tablet TAKE 1/2 TABLET IN THE MORNING, 1 TABLET AT NOON & 1 TABLET AT NIGHT. Yes Elias Collins MD   NIFEdipine (ADALAT CC) 60 MG extended release tablet Take 1 tablet by mouth daily Yes Tremayne Cool MD   atorvastatin (LIPITOR) 20 MG tablet Take 1 tablet by mouth daily Yes Tremayne Cool MD   carvedilol (COREG) 25 MG tablet TAKE 1 TABLET BY MOUTH TWICE A DAY Yes Tremayne Cool MD   glimepiride (AMARYL) 2 MG tablet Take 2 mg by mouth every morning (before breakfast) Yes Historical Provider, MD   alprazolam (XANAX) 0.25 MG tablet Take 0.25 mg by mouth 3 times daily as needed. TAKES 1/2 TAB DAILY OR TWO TIMES A DAY AS NEEDED Yes Historical Provider, MD       [x] Medications and dosages reviewed.    ROS:  [x]Full ROS obtained and negative except as mentioned in HPI       Physical Examination:    Vitals:    01/13/23 1102   BP: 130/70   Pulse: 82   SpO2: 99%      /70 (Site: Left Upper Arm,  Position: Sitting, Cuff Size: Large Adult)   Pulse 82   Ht 5' 10\" (1.778 m)   Wt 251 lb (113.9 kg)   SpO2 99%   BMI 36.01 kg/m²        GENERAL: Elderly overweight male in NAD  NEUROLOGICAL: Alert and oriented, no motor abnormalities  PSYCH: Calm affect  SKIN: Warm and dry, No rash  HEENT: Normocephalic, Sclera non-icteric, mucus membranes moist  NECK: supple, JVP normal  CAROTID: Normal upstroke, no bruits  CARDIAC: Normal PMI, regular rate and rhythm, normal S1S2, No murmur, rub, or gallop  RESPIRATORY: Normal respiratory effort, clear to auscultation bilaterally  EXTREMITIES: trace LE edema  MUSCULOSKELETAL: No joint swelling or tenderness, no chest wall tenderness  GASTROINTESTINAL: normal bowel sounds, soft, non-tender, no bruit      MYOVIEW 1/2021  Normal myocardial perfusion. Normal LV size and systolic function. EF=67%. ECHO 1/2021   -Technically difficult examination.   -Definity was administered.   -Suboptimal image quality.   -Normal left ventricle size, wall thickness, and systolic function with an   estimated ejection fraction of 55%.   -No regional wall motion abnormalities are seen. -E/e=7.6.   -Trivial tricuspid regurgitation. PASP of 28 mmHg. EKG:  NSR    ASSESSMENT:      HTN:  /70 (Site: Left Upper Arm, Position: Sitting, Cuff Size: Large Adult)   Pulse 82   Ht 5' 10\" (1.778 m)   Wt 251 lb (113.9 kg)   SpO2 99%   BMI 36.01 kg/m²   Well controlled. Continue current meds including lisinopril and coreg    CAD:  Continue medical therapy. Remains stable without angina  Myoview 1/2021 normal.  Abdominal and some chest pressure with eating. Sounds GI. If he notices exertional symptoms he will need cath, but concern about risk with CRI. Myoview 8/2018 fixed inferior scar vs attenuation.     S/P CABG 2007  Negative GXT [place] 2015  Normal LV function 2015 as well on ECHO  myoview 2012 \"inferior wall attenuation\"      Hyperlipidemia:  LDL=69 then 13, controlled  Continue lipitor      Sleep Apnea: On CPAP. Controlled. Continue CPAP    Bradycardia:  Resolved s/p pacer. Now rate responsive  Improved    Claudication:  PCI left leg 2019  Did not f/u with Dr. Stevenson garcia. Current symptoms sound more c/w knee issues    Epistaxis  Resolved off of plavix  No issues with eliquis thus far    CRI:  Creat 1.7  Stable  Nephrology f/u planned    NSVT:  Stable. Follow. Occasional short NSVT on pacer. Stable. Follow  Stress and myoview normal  Discussed with pt and Dr. Александр Cartwright. He is at increased risk with invasive procedures due to age, CRI and prior CABG making it difficult to limit contrast  At this time with no symptoms and normal LV will follow.  If he has increased episodes of NSVT will need to cath    Chest Pain:  Resolved with Pepcid  Follow    Afib:  PAF  High CHADS2-Vasc=6 (CHF, HTN, AGE x 2, diabetes, Vasc)  Continue eliquis 2.5 BID  No ASA due to eliquis  Refuses to see GI     Plan:  Stable  Continue eliquis  F/u 6 months with labs      Thank you for allowing me to participate in the care of this individual.      Csasy Moreno M.D., [facility]

## 2023-02-14 ENCOUNTER — NURSE ONLY (OUTPATIENT)
Dept: CARDIOLOGY CLINIC | Age: 88
End: 2023-02-14
Payer: MEDICARE

## 2023-02-14 DIAGNOSIS — Z95.0 PACEMAKER: ICD-10-CM

## 2023-02-14 DIAGNOSIS — R00.1 SYMPTOMATIC BRADYCARDIA: Primary | ICD-10-CM

## 2023-02-14 PROCEDURE — 93296 REM INTERROG EVL PM/IDS: CPT | Performed by: INTERNAL MEDICINE

## 2023-02-14 PROCEDURE — 93294 REM INTERROG EVL PM/LDLS PM: CPT | Performed by: INTERNAL MEDICINE

## 2023-02-14 NOTE — PROGRESS NOTES
We received remote transmission from patient's monitor at home. Transmission shows normal sensing and pacing function. EP physician will review. See interrogation under cardiology tab in the [address] [facility] [address] field for more details. Noted NSVT. Pt is on Coreg.      2.5% (MVP On)  AP 91.6%    End of 91-day monitoring period 2-14-23.

## 2023-05-05 ENCOUNTER — TELEPHONE (OUTPATIENT)
Dept: CARDIOLOGY CLINIC | Age: 88
End: 2023-05-05

## 2023-05-05 LAB
ALBUMIN SERPL-MCNC: 3.7 G/DL (ref 3.5–5.2)
PHOSPHORUS: 4.2 MG/DL (ref 2.5–4.5)

## 2023-05-05 NOTE — TELEPHONE ENCOUNTER
----- Message from Karina Spaulding MD sent at 5/5/2023 12:46 PM EDT -----  Renal function and blood count slightly worse. Blood count has been lower in more distant past. Has refused to see GI from my note. Ask about bleeding. Discuss with nephrology at appointment next week. Needs to discuss anemia with PCP as well.     [facility]

## 2023-05-05 NOTE — TELEPHONE ENCOUNTER
Spoke to patient's wife. (On hipaa) Patient is outside working. Reviewed info with her. He has had no issues with bleeding. Sees kidney doctor next week and will talk to PCP about anemia also.

## 2023-05-16 ENCOUNTER — NURSE ONLY (OUTPATIENT)
Dept: CARDIOLOGY CLINIC | Age: 88
End: 2023-05-16
Payer: MEDICARE

## 2023-05-16 DIAGNOSIS — I49.5 TACHY-BRADY SYNDROME (HCC): Primary | ICD-10-CM

## 2023-05-16 DIAGNOSIS — Z95.0 PACEMAKER: ICD-10-CM

## 2023-05-16 PROCEDURE — 93296 REM INTERROG EVL PM/IDS: CPT | Performed by: INTERNAL MEDICINE

## 2023-05-16 PROCEDURE — 93294 REM INTERROG EVL PM/LDLS PM: CPT | Performed by: INTERNAL MEDICINE

## 2023-05-16 NOTE — PROGRESS NOTES
We received remote transmission from patient's monitor at home. Transmission shows normal sensing and pacing function. EP physician will review. See interrogation under cardiology tab in the [address] [place] [address] [address] field for more details.  1.6% (MVP On)  AP 90.1%    End of 91-day monitoring period 5-16-23.

## 2023-08-01 ENCOUNTER — OFFICE VISIT (OUTPATIENT)
Dept: CARDIOLOGY CLINIC | Age: 88
End: 2023-08-01
Payer: MEDICARE

## 2023-08-01 VITALS
HEIGHT: 70 IN | SYSTOLIC BLOOD PRESSURE: 132 MMHG | WEIGHT: 245 LBS | BODY MASS INDEX: 35.07 KG/M2 | OXYGEN SATURATION: 99 % | DIASTOLIC BLOOD PRESSURE: 60 MMHG | HEART RATE: 64 BPM

## 2023-08-01 DIAGNOSIS — I10 PRIMARY HYPERTENSION: ICD-10-CM

## 2023-08-01 DIAGNOSIS — I48.0 PAROXYSMAL ATRIAL FIBRILLATION (HCC): Primary | ICD-10-CM

## 2023-08-01 DIAGNOSIS — I50.32 CHRONIC DIASTOLIC CONGESTIVE HEART FAILURE (HCC): ICD-10-CM

## 2023-08-01 DIAGNOSIS — I25.10 CORONARY ARTERY DISEASE INVOLVING NATIVE CORONARY ARTERY OF NATIVE HEART WITHOUT ANGINA PECTORIS: ICD-10-CM

## 2023-08-01 DIAGNOSIS — I65.23 ASYMPTOMATIC BILATERAL CAROTID ARTERY STENOSIS: ICD-10-CM

## 2023-08-01 DIAGNOSIS — N18.30 STAGE 3 CHRONIC KIDNEY DISEASE, UNSPECIFIED WHETHER STAGE 3A OR 3B CKD (HCC): ICD-10-CM

## 2023-08-01 DIAGNOSIS — I47.29 NSVT (NONSUSTAINED VENTRICULAR TACHYCARDIA) (HCC): ICD-10-CM

## 2023-08-01 PROCEDURE — 1123F ACP DISCUSS/DSCN MKR DOCD: CPT | Performed by: INTERNAL MEDICINE

## 2023-08-01 PROCEDURE — 99214 OFFICE O/P EST MOD 30 MIN: CPT | Performed by: INTERNAL MEDICINE

## 2023-08-01 RX ORDER — DIMENHYDRINATE 50 MG
200 TABLET ORAL DAILY
COMMUNITY

## 2023-08-01 NOTE — PROGRESS NOTES
[facility]  Cardiac Consult     Referring Provider:  Aishwarya Menard MD     Chief Complaint   Patient presents with    6 Month Follow-Up    Coronary Artery Disease    Hypertension    Hyperlipidemia        History of Present Illness:  81 y/o male formally followed by Dr. Guerin Rather seen for f/u for CAD, s/p CABG, hyperlipidemia and uncontrolled HTN. He feels well. He denies anginal type chest discomfort. He has some discomfort in left lower chest when he awakes in the morning that resolves with belching. \"\"I have an acid stomach\". He denies any exertional chest discomfort. He does not take any meds for GERD. He was found to have an 18 beat run of NSVT on pacer check. Due to this an ECHO and myoview were obtained showing normal LV function and normal perfusion (EF=69%). He was seen by EP and options discussed including cardiac cath. We discussed options. I reviewed pacer checks with Dr. Tito Vickers. 8 episodes NSVT in 24 months. Jan 2021 18 beats, Aug 2020 6 beats March 220 4 beats. All asymptomatic. Due to his age, abnormal renal function and overall risk we have deferred on cardiac cath. He was found to have paroxysmal atrial fib on pacer check for several hours. Asymptomatic. Started on eliquis 2.5. Tolerating without any bleeding. His indigestion has resolved on pepcid. He does not want to see GI. He is doing OK. Some skin lesions and he is wondering if they could be related to eliquis. Past Medical History:   has a past medical history of Anxiety, Arthritis, CAD (coronary artery disease), Cancer ([address]), GERD (gastroesophageal reflux disease), Hyperlipidemia, and Hypertension. Surgical History:   has a past surgical history that includes Appendectomy; Tonsillectomy; Knee arthroscopy; TURP; Coronary angioplasty with stent; Carpal tunnel release; TURP (12-); Cardiac surgery (2007); Prostate surgery; Mohs surgery; and pacemaker placement (10/04/2018).      Social History:   reports

## 2023-08-08 ENCOUNTER — TELEPHONE (OUTPATIENT)
Dept: CARDIOLOGY CLINIC | Age: 88
End: 2023-08-08

## 2023-08-08 NOTE — TELEPHONE ENCOUNTER
I spoke w/ pt's wife and provided her with [phone] to call for assistance. Samples signed off and wife notified she can  in [place] on Thursday.

## 2023-08-08 NOTE — TELEPHONE ENCOUNTER
Last ov [facility] 8/1/23 dx:cad hld [place] claudication  epistaxis cp afib        Afib:  PAF-Seen on pacer checks  High CHADS2-Vasc=6 (CHF, HTN, AGE x 2, diabetes, Vasc)  Continue eliquis 2.5 BID  No ASA due to eliquis        Plan:  Stable  Continue eliquis  F/u 6 months  F/u EP as scheduled  Ask derm about skin lesions

## 2023-08-08 NOTE — TELEPHONE ENCOUNTER
Wife states pt is in donut hole and eliquis is now $ 504. Asking for alternative or other options.  Please call to advise

## 2023-08-25 PROCEDURE — 93294 REM INTERROG EVL PM/LDLS PM: CPT | Performed by: INTERNAL MEDICINE

## 2023-08-25 PROCEDURE — 93296 REM INTERROG EVL PM/IDS: CPT | Performed by: INTERNAL MEDICINE

## 2023-08-30 RX ORDER — HYDRALAZINE HYDROCHLORIDE 100 MG/1
TABLET, FILM COATED ORAL
Qty: 225 TABLET | Refills: 4 | Status: SHIPPED | OUTPATIENT
Start: 2023-08-30

## 2023-08-30 NOTE — TELEPHONE ENCOUNTER
Received refill request for Hydralazine from [facility] pharmacy.     Last ov:08/01/2023 MMK    Last Refill:01/13/2023    Next appointment:12/13/2023 MICKY

## 2023-11-24 PROCEDURE — 93296 REM INTERROG EVL PM/IDS: CPT | Performed by: INTERNAL MEDICINE

## 2023-11-24 PROCEDURE — 93294 REM INTERROG EVL PM/LDLS PM: CPT | Performed by: INTERNAL MEDICINE

## 2023-11-29 RX ORDER — APIXABAN 2.5 MG/1
2.5 TABLET, FILM COATED ORAL 2 TIMES DAILY
Qty: 180 TABLET | Refills: 4 | Status: SHIPPED | OUTPATIENT
Start: 2023-11-29

## 2023-11-29 NOTE — TELEPHONE ENCOUNTER
Received refill request for Eliquis from [facility] pharmacy.     Last ov:08/01/2023 MMK    Last labs:08/19/2023 CMP    Last Refill:11/17/2022    Next appointment:12/13/2023 MICKY

## 2023-12-12 NOTE — PROGRESS NOTES
[facility]   Electrophysiology Follow up   Date: 12/13/2023  I had the privilege of visiting Petra Mcneil in the office. CC: PAF    HPI: Petra Mcneil is a 80 y.o. male with pmh CAD s/p CABG 2007, history of PCI, HTN,  HLD, HFpEF, CKD and symptomatic bradycardia. 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 Had a well care visit 01/07/2021 and complained of episodes of significant lightheadedness. Holter monitor was ordered, which showed NSVT. IC evaluation for possible LHC on 2/19/2021. Recommended monitoring for increased NSVT before proceeding with LHC d/t CRI and previous CABG. Billy Nguyen presents today in annual follow up 12/20/2022 . He states when he sits down he has \"skipped beats\". He c/o some SOB     Assessment and Plan:    -PAF              Denies symptoms with episodes of atrial fibrillation                ECG today:  a paced , PVC    Gates on device:  < 0.1%               Patient has a [id number] Score of at least 5 (HTN, CAD, age1, DM)   ~ Continue  eliquis 2.5 mg BID,~  Continue low dose eliquis d/t CRI and Cr >1.5 age >80 y.o.  ~ he has some mild bruising on his upper arms and states he has had them on his legs as well. Continue coreg 25 mg BID for rate control                 Discussed other options including antiarrhythmics, ablation, etc if his Afib burden increases. He is asymptomatic and has low burden.  Continue monitoring.      -Brief NSVT              Noted on device interrogation,  Monitored VT 11/30/2022 for 21 seconds               Was asymptomatic with episodes              Myoview 1/2021, not a good candidate for cath d/t CRI              On carvedilol 25 mg BID     -Tachy Chidi/Symptomatic bradycardia              s/p dual chamber PPM 10/4/2018    Interrogation today shows: 8.3  years remaining, AP 91.3% ,  1.8%,  <0.1% AT/AF burden per device interrogation today, Underlying SB 50 , presenting APVS @ 70 bpm  3  episodes NSVT   PVC

## 2023-12-13 ENCOUNTER — NURSE ONLY (OUTPATIENT)
Dept: CARDIOLOGY CLINIC | Age: 88
End: 2023-12-13

## 2023-12-13 ENCOUNTER — OFFICE VISIT (OUTPATIENT)
Dept: CARDIOLOGY CLINIC | Age: 88
End: 2023-12-13

## 2023-12-13 VITALS
BODY MASS INDEX: 36.08 KG/M2 | HEIGHT: 70 IN | SYSTOLIC BLOOD PRESSURE: 128 MMHG | DIASTOLIC BLOOD PRESSURE: 70 MMHG | WEIGHT: 252 LBS | HEART RATE: 84 BPM | OXYGEN SATURATION: 95 %

## 2023-12-13 DIAGNOSIS — I47.29 NSVT (NONSUSTAINED VENTRICULAR TACHYCARDIA) (HCC): ICD-10-CM

## 2023-12-13 DIAGNOSIS — I25.10 CORONARY ARTERY DISEASE INVOLVING NATIVE CORONARY ARTERY OF NATIVE HEART WITHOUT ANGINA PECTORIS: ICD-10-CM

## 2023-12-13 DIAGNOSIS — G47.30 SLEEP APNEA, UNSPECIFIED TYPE: ICD-10-CM

## 2023-12-13 DIAGNOSIS — I48.0 PAROXYSMAL ATRIAL FIBRILLATION (HCC): Primary | ICD-10-CM

## 2023-12-13 DIAGNOSIS — I49.5 TACHY-BRADY SYNDROME (HCC): ICD-10-CM

## 2023-12-13 DIAGNOSIS — I48.0 PAROXYSMAL ATRIAL FIBRILLATION (HCC): ICD-10-CM

## 2023-12-13 DIAGNOSIS — I10 PRIMARY HYPERTENSION: ICD-10-CM

## 2023-12-13 DIAGNOSIS — Z95.0 PACEMAKER: Primary | ICD-10-CM

## 2023-12-13 DIAGNOSIS — R00.1 SYMPTOMATIC BRADYCARDIA: ICD-10-CM

## 2024-01-26 ENCOUNTER — OFFICE VISIT (OUTPATIENT)
Dept: CARDIOLOGY CLINIC | Age: 89
End: 2024-01-26
Payer: MEDICARE

## 2024-01-26 VITALS
WEIGHT: 248 LBS | HEART RATE: 78 BPM | BODY MASS INDEX: 35.5 KG/M2 | SYSTOLIC BLOOD PRESSURE: 144 MMHG | DIASTOLIC BLOOD PRESSURE: 70 MMHG | HEIGHT: 70 IN | OXYGEN SATURATION: 98 %

## 2024-01-26 DIAGNOSIS — Z95.0 PACEMAKER: ICD-10-CM

## 2024-01-26 DIAGNOSIS — N18.30 STAGE 3 CHRONIC KIDNEY DISEASE, UNSPECIFIED WHETHER STAGE 3A OR 3B CKD (HCC): ICD-10-CM

## 2024-01-26 DIAGNOSIS — E78.2 MIXED HYPERLIPIDEMIA: ICD-10-CM

## 2024-01-26 DIAGNOSIS — I48.0 PAROXYSMAL ATRIAL FIBRILLATION (HCC): ICD-10-CM

## 2024-01-26 DIAGNOSIS — I10 PRIMARY HYPERTENSION: Primary | ICD-10-CM

## 2024-01-26 DIAGNOSIS — I73.9 CLAUDICATION OF BOTH LOWER EXTREMITIES (HCC): ICD-10-CM

## 2024-01-26 DIAGNOSIS — I47.29 NSVT (NONSUSTAINED VENTRICULAR TACHYCARDIA) (HCC): ICD-10-CM

## 2024-01-26 DIAGNOSIS — I25.10 CORONARY ARTERY DISEASE INVOLVING NATIVE CORONARY ARTERY OF NATIVE HEART WITHOUT ANGINA PECTORIS: ICD-10-CM

## 2024-01-26 DIAGNOSIS — G47.30 SLEEP APNEA, UNSPECIFIED TYPE: ICD-10-CM

## 2024-01-26 PROCEDURE — 99214 OFFICE O/P EST MOD 30 MIN: CPT | Performed by: INTERNAL MEDICINE

## 2024-01-26 PROCEDURE — 1123F ACP DISCUSS/DSCN MKR DOCD: CPT | Performed by: INTERNAL MEDICINE

## 2024-01-26 RX ORDER — ATORVASTATIN CALCIUM 20 MG/1
20 TABLET, FILM COATED ORAL DAILY
Qty: 90 TABLET | Refills: 4 | Status: SHIPPED | OUTPATIENT
Start: 2024-01-26

## 2024-01-26 RX ORDER — HYDRALAZINE HYDROCHLORIDE 100 MG/1
100 TABLET, FILM COATED ORAL 2 TIMES DAILY
Qty: 225 TABLET | Refills: 4
Start: 2024-01-26

## 2024-01-26 NOTE — PATIENT INSTRUCTIONS
Take Hydralazine 100mg in the morning and in the evening - If BP is running low - okay to take 1/2 tab in the morning and 1/2 in the morning    Follow up with Dr. Cool in 6 months

## 2024-01-26 NOTE — PROGRESS NOTES
at times. He is taking Hydralazine TID. Will decrease to BID.  Continue current meds including lisinopril and coreg    CAD:  Continue medical therapy. Remains stable without angina  Myoview 1/2021 normal.  Abdominal and some chest pressure with eating. Sounds GI. If he notices exertional symptoms he will need cath, but concern about risk with CRI.   Myoview 8/2018 fixed inferior scar vs attenuation.    S/P CABG 2007  Negative GXT [place] 2015  Normal LV function 2015 as well on ECHO  myoview 2012 \"inferior wall attenuation\"      Hyperlipidemia:  LDL=40 controlled  Continue lipitor      Sleep Apnea:  On CPAP. Controlled. Continue CPAP    Bradycardia:  Resolved s/p pacer.   Now rate responsive  Improved    Claudication:  PCI left leg 2019  Did not f/u with Dr. Ruiz   Seems stable. Current symptoms sound more c/w knee issues    Epistaxis  Resolved off of plavix  No issues with eliquis thus far  Follow    CRI:  Creat 2.33  Stable  Nephrology f/u planned    NSVT:  Stable. Follow.  Occasional short NSVT on pacer. Stable.  Follow  Stress and myoview normal  Discussed with pt and Dr. Cervantes. He is at increased risk with invasive procedures due to age, CRI and prior CABG making it difficult to limit contrast  At this time with no symptoms and normal LV will follow. If he has increased episodes of NSVT will need to cath    Chest Pain:  Resolved with Pepcid  Follow    Afib:  PAF-Seen on pacer checks  High CHADS2-Vasc=6 (CHF, HTN, AGE x 2, diabetes, Vasc)  Continue eliquis 2.5 BID  No ASA due to eliquis      Plan:  Stable  Renal function slowly worsening  Decrease hydralazine to BID      Thank you for allowing me to participate in the care of this individual.      Tremayne Cool M.D., FACC

## 2024-04-17 ENCOUNTER — TELEPHONE (OUTPATIENT)
Dept: CARDIOLOGY CLINIC | Age: 89
End: 2024-04-17

## 2024-04-17 NOTE — TELEPHONE ENCOUNTER
CARDIAC CLEARANCE     What type of procedure are you having?  Skin cancer removal   Which physician is performing your procedure?  Dr mckinnon   When is your procedure scheduled for?  4/24  Where are you having this procedure?  [facility]    Are you taking Blood Thinners?   If so what? (Name/dose/frequesncy)  Elequis 2.5   Does the surgeon want you to stop your blood thinner?  If so for how long?  2 days prior   Phone Number and Contact Name for Physicians office:  [phone] [facility]   Fax number to send information:  [phone]

## 2024-06-03 ENCOUNTER — OFFICE VISIT (OUTPATIENT)
Dept: CARDIOLOGY CLINIC | Age: 89
End: 2024-06-03
Payer: MEDICARE

## 2024-06-03 VITALS
HEART RATE: 86 BPM | OXYGEN SATURATION: 96 % | HEIGHT: 70 IN | BODY MASS INDEX: 35.22 KG/M2 | DIASTOLIC BLOOD PRESSURE: 60 MMHG | SYSTOLIC BLOOD PRESSURE: 128 MMHG | WEIGHT: 246 LBS

## 2024-06-03 DIAGNOSIS — E78.2 MIXED HYPERLIPIDEMIA: ICD-10-CM

## 2024-06-03 DIAGNOSIS — I48.0 PAROXYSMAL ATRIAL FIBRILLATION (HCC): ICD-10-CM

## 2024-06-03 DIAGNOSIS — R60.0 BILATERAL LOWER EXTREMITY EDEMA: ICD-10-CM

## 2024-06-03 DIAGNOSIS — I10 PRIMARY HYPERTENSION: ICD-10-CM

## 2024-06-03 DIAGNOSIS — Z95.0 PACEMAKER: ICD-10-CM

## 2024-06-03 DIAGNOSIS — N18.30 STAGE 3 CHRONIC KIDNEY DISEASE, UNSPECIFIED WHETHER STAGE 3A OR 3B CKD (HCC): ICD-10-CM

## 2024-06-03 DIAGNOSIS — I25.10 CORONARY ARTERY DISEASE INVOLVING NATIVE CORONARY ARTERY OF NATIVE HEART WITHOUT ANGINA PECTORIS: Primary | ICD-10-CM

## 2024-06-03 DIAGNOSIS — G47.30 SLEEP APNEA, UNSPECIFIED TYPE: ICD-10-CM

## 2024-06-03 DIAGNOSIS — I73.9 CLAUDICATION OF BOTH LOWER EXTREMITIES (HCC): ICD-10-CM

## 2024-06-03 DIAGNOSIS — I47.29 NSVT (NONSUSTAINED VENTRICULAR TACHYCARDIA) (HCC): ICD-10-CM

## 2024-06-03 DIAGNOSIS — R79.89 ELEVATED TROPONIN: ICD-10-CM

## 2024-06-03 PROCEDURE — 99214 OFFICE O/P EST MOD 30 MIN: CPT | Performed by: INTERNAL MEDICINE

## 2024-06-03 PROCEDURE — 1123F ACP DISCUSS/DSCN MKR DOCD: CPT | Performed by: INTERNAL MEDICINE

## 2024-06-03 RX ORDER — OMEGA-3S/DHA/EPA/FISH OIL/D3 300MG-1000
400 CAPSULE ORAL DAILY
COMMUNITY

## 2024-06-03 NOTE — PROGRESS NOTES
[facility]  Cardiac Consult     Referring Provider:  Micah Sauceda MD     Chief Complaint   Patient presents with    Follow-Up from Hospital    Coronary Artery Disease    Hypertension        History of Present Illness:  83 y/o male formally followed by Dr. Pina seen for f/u for CAD, s/p CABG, hyperlipidemia and uncontrolled HTN.    He feels well. He denies anginal type chest discomfort. He has some discomfort in left lower chest when he awakes in the morning that resolves with belching. \"\"I have an acid stomach\". He denies any exertional chest discomfort. He does not take any meds for GERD. He was found to have an 18 beat run of NSVT on pacer check. Due to this an ECHO and myoview were obtained showing normal LV function and normal perfusion (EF=69%). He was seen by EP and options discussed including cardiac cath. We discussed options. I reviewed pacer checks with Dr. Cervantes. 8 episodes NSVT in 24 months. Jan 2021 18 beats, Aug 2020 6 beats March 220 4 beats. All asymptomatic.     Due to his age, abnormal renal function and overall risk we have deferred on cardiac cath.     He was found to have paroxysmal atrial fib on pacer check for several hours. Asymptomatic. Started on eliquis 2.5. Tolerating without any bleeding. His indigestion has resolved on pepcid. He does not want to see GI.      He was admitted to [facility] with kidney stone, worsening renal function and required stent placement. Feels better. Troponin was elevated but flat. LE edema worsening with urine protein/creat ratio now 9. Followed by nephrology and lisinopril added. No chest pain. He is having increased palpitations      Past Medical History:   has a past medical history of Anxiety, Arthritis, CAD (coronary artery disease), Cancer (HCC), GERD (gastroesophageal reflux disease), Hyperlipidemia, and Hypertension.    Surgical History:   has a past surgical history that includes Appendectomy; Tonsillectomy; Knee arthroscopy; TURP;

## 2024-06-24 ENCOUNTER — TELEPHONE (OUTPATIENT)
Dept: CARDIOLOGY CLINIC | Age: 89
End: 2024-06-24

## 2024-06-24 NOTE — TELEPHONE ENCOUNTER
----- Message from Tremayne Cool MD sent at 6/24/2024 11:11 AM EDT -----  Some PAC's and PVC's. 6%. Short NSVT. Seems similar to past.  F/u as planned with ECHO.    CECE

## 2024-06-24 NOTE — TELEPHONE ENCOUNTER
Left message on patient's VM. Asked him to call back to discuss monitor results. Reminded him of echo and OV 7/9.

## 2024-07-09 ENCOUNTER — OFFICE VISIT (OUTPATIENT)
Dept: CARDIOLOGY CLINIC | Age: 89
End: 2024-07-09
Payer: MEDICARE

## 2024-07-09 VITALS
SYSTOLIC BLOOD PRESSURE: 126 MMHG | HEIGHT: 70 IN | DIASTOLIC BLOOD PRESSURE: 64 MMHG | OXYGEN SATURATION: 97 % | BODY MASS INDEX: 33.79 KG/M2 | WEIGHT: 236 LBS | HEART RATE: 64 BPM

## 2024-07-09 DIAGNOSIS — G47.30 SLEEP APNEA, UNSPECIFIED TYPE: ICD-10-CM

## 2024-07-09 DIAGNOSIS — R60.9 EDEMA, UNSPECIFIED TYPE: ICD-10-CM

## 2024-07-09 DIAGNOSIS — R00.1 SYMPTOMATIC BRADYCARDIA: ICD-10-CM

## 2024-07-09 DIAGNOSIS — I10 PRIMARY HYPERTENSION: Primary | ICD-10-CM

## 2024-07-09 DIAGNOSIS — I73.9 CLAUDICATION OF BOTH LOWER EXTREMITIES (HCC): ICD-10-CM

## 2024-07-09 DIAGNOSIS — I47.29 NSVT (NONSUSTAINED VENTRICULAR TACHYCARDIA) (HCC): ICD-10-CM

## 2024-07-09 DIAGNOSIS — E78.2 MIXED HYPERLIPIDEMIA: ICD-10-CM

## 2024-07-09 DIAGNOSIS — N18.30 STAGE 3 CHRONIC KIDNEY DISEASE, UNSPECIFIED WHETHER STAGE 3A OR 3B CKD (HCC): ICD-10-CM

## 2024-07-09 DIAGNOSIS — I48.0 PAROXYSMAL ATRIAL FIBRILLATION (HCC): ICD-10-CM

## 2024-07-09 DIAGNOSIS — I25.10 CORONARY ARTERY DISEASE INVOLVING NATIVE CORONARY ARTERY OF NATIVE HEART WITHOUT ANGINA PECTORIS: ICD-10-CM

## 2024-07-09 PROCEDURE — 1123F ACP DISCUSS/DSCN MKR DOCD: CPT | Performed by: INTERNAL MEDICINE

## 2024-07-09 PROCEDURE — 99214 OFFICE O/P EST MOD 30 MIN: CPT | Performed by: INTERNAL MEDICINE

## 2024-07-09 NOTE — PROGRESS NOTES
[facility]  Cardiac Consult     Referring Provider:  Micah Sauceda MD     Chief Complaint   Patient presents with    1 Month Follow-Up    Coronary Artery Disease     F/u echo    Atrial Fibrillation        History of Present Illness:  81 y/o male formally followed by Dr. Pina seen for f/u for CAD, s/p CABG, hyperlipidemia and uncontrolled HTN.    He feels well. He denies anginal type chest discomfort. He has some discomfort in left lower chest when he awakes in the morning that resolves with belching. \"\"I have an acid stomach\". He denies any exertional chest discomfort. He does not take any meds for GERD. He was found to have an 18 beat run of NSVT on pacer check. Due to this an ECHO and myoview were obtained showing normal LV function and normal perfusion (EF=69%). He was seen by EP and options discussed including cardiac cath. We discussed options. I reviewed pacer checks with Dr. Cervantes. 8 episodes NSVT in 24 months. Jan 2021 18 beats, Aug 2020 6 beats March 220 4 beats. All asymptomatic.     Due to his age, abnormal renal function and overall risk we have deferred on cardiac cath.     He was found to have paroxysmal atrial fib on pacer check for several hours. Asymptomatic. Started on eliquis 2.5. Tolerating without any bleeding. His indigestion has resolved on pepcid. He does not want to see GI.      He was admitted to [facility] with kidney stone, worsening renal function and required stent placement.  LE edema worsening with urine protein/creat ratio now 9. Followed by nephrology and lisinopril added. Proteinuria decreased from 9=>3 grams.    He returns with an ECHO normal LV function. Mild valvular regurgitation. Holter for palpitations with PVC burden of 6.2%, 5 beat NSVT, short PAT.      Past Medical History:   has a past medical history of Anxiety, Arthritis, CAD (coronary artery disease), Cancer (HCC), GERD (gastroesophageal reflux disease), Hyperlipidemia, and Hypertension.    Surgical

## 2024-12-11 ENCOUNTER — NURSE ONLY (OUTPATIENT)
Dept: CARDIOLOGY CLINIC | Age: 88
End: 2024-12-11

## 2024-12-11 ENCOUNTER — OFFICE VISIT (OUTPATIENT)
Dept: CARDIOLOGY CLINIC | Age: 88
End: 2024-12-11

## 2024-12-11 VITALS
HEART RATE: 65 BPM | WEIGHT: 242 LBS | OXYGEN SATURATION: 97 % | SYSTOLIC BLOOD PRESSURE: 120 MMHG | DIASTOLIC BLOOD PRESSURE: 60 MMHG | BODY MASS INDEX: 34.65 KG/M2 | HEIGHT: 70 IN

## 2024-12-11 DIAGNOSIS — Z95.0 PACEMAKER: Primary | ICD-10-CM

## 2024-12-11 DIAGNOSIS — G47.30 SLEEP APNEA, UNSPECIFIED TYPE: ICD-10-CM

## 2024-12-11 DIAGNOSIS — I25.10 CORONARY ARTERY DISEASE INVOLVING NATIVE CORONARY ARTERY OF NATIVE HEART WITHOUT ANGINA PECTORIS: ICD-10-CM

## 2024-12-11 DIAGNOSIS — I49.5 TACHY-BRADY SYNDROME (HCC): ICD-10-CM

## 2024-12-11 DIAGNOSIS — R00.1 SYMPTOMATIC BRADYCARDIA: ICD-10-CM

## 2024-12-11 DIAGNOSIS — I47.29 NSVT (NONSUSTAINED VENTRICULAR TACHYCARDIA) (HCC): ICD-10-CM

## 2024-12-11 DIAGNOSIS — I48.0 PAROXYSMAL ATRIAL FIBRILLATION (HCC): Primary | ICD-10-CM

## 2024-12-11 DIAGNOSIS — E66.9 OBESITY, UNSPECIFIED OBESITY SEVERITY, UNSPECIFIED OBESITY TYPE: ICD-10-CM

## 2024-12-11 DIAGNOSIS — I48.0 PAROXYSMAL ATRIAL FIBRILLATION (HCC): ICD-10-CM

## 2024-12-11 DIAGNOSIS — I10 PRIMARY HYPERTENSION: ICD-10-CM

## 2024-12-11 NOTE — PROGRESS NOTES
[facility]   Electrophysiology Follow up   Date: 12/11/2024  I had the privilege of visiting Chester Levirobby Patrick in the office.       CC: SOB   HPI: Chester Castano Jr. is a 88 y.o. male with pmh CAD s/p CABG 2007, history of PCI, HTN,  HLD, HFpEF, CKD and symptomatic bradycardia. 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       Had a well care visit 01/07/2021 and complained of episodes of significant lightheadedness. Holter monitor was ordered, which showed NSVT.      IC evaluation for possible LHC on 2/19/2021.  recommended continued monitoring for increased NSVT before proceeding with LHC d/t CRI and previous CABG.    Chester presents to the office in follow up. His only complaint is shortness of breath and skipped beats with activity. Resolves with rest. No other cardiac complaints at this time.    Assessment and Plan:    -PAF              ECG today shows SR   <0.1% AT/AF burden per device interrogation today.               High risk [id number] score 5 (HTN, CAD, age3, DM)   Continue  eliquis 2.5 mg BID,~  Continue low dose eliquis d/t CRI and Cr >1.5 age >80 y.o.. Will monitor for excessive bruising or bleeding. He is tolerating this for now and will continue it.               Continue coreg 25 mg BID for rate control    -Brief NSVT   8 NSVT, last on 11/17/2024, longest 2 seconds              Noted on device interrogation,  Monitored VT 11/30/2022 for 21 seconds               Was asymptomatic with episodes              Myoview 1/2021, not a good candidate for cath d/t CRI              Continue carvedilol 25 mg BID     -Tachy Chidi/Symptomatic bradycardia              s/p dual chamber PPM 10/4/2018   Interrogation today shows:7.8 years remaining, AP 94.2% ,  2.3%,  <0.1% AT/AF burden per device interrogation today, Underlying SB, presenting APVS @ 92 BPM   8 NSVT, last on 11/17/2024, longest 2 seconds     - Epistaxis   Resolved              Occurred on Plavix              Tolerating

## 2025-01-16 ENCOUNTER — OFFICE VISIT (OUTPATIENT)
Dept: CARDIOLOGY CLINIC | Age: 89
End: 2025-01-16

## 2025-01-16 VITALS
HEART RATE: 72 BPM | DIASTOLIC BLOOD PRESSURE: 58 MMHG | SYSTOLIC BLOOD PRESSURE: 116 MMHG | HEIGHT: 70 IN | OXYGEN SATURATION: 96 % | WEIGHT: 246 LBS | BODY MASS INDEX: 35.22 KG/M2

## 2025-01-16 DIAGNOSIS — Z95.1 HISTORY OF CORONARY ARTERY BYPASS GRAFT: ICD-10-CM

## 2025-01-16 DIAGNOSIS — I48.0 PAROXYSMAL ATRIAL FIBRILLATION (HCC): ICD-10-CM

## 2025-01-16 DIAGNOSIS — G47.30 SLEEP APNEA, UNSPECIFIED TYPE: ICD-10-CM

## 2025-01-16 DIAGNOSIS — N18.30 STAGE 3 CHRONIC KIDNEY DISEASE, UNSPECIFIED WHETHER STAGE 3A OR 3B CKD (HCC): ICD-10-CM

## 2025-01-16 DIAGNOSIS — R00.1 SYMPTOMATIC BRADYCARDIA: ICD-10-CM

## 2025-01-16 DIAGNOSIS — I47.29 NSVT (NONSUSTAINED VENTRICULAR TACHYCARDIA) (HCC): ICD-10-CM

## 2025-01-16 DIAGNOSIS — I73.9 CLAUDICATION OF BOTH LOWER EXTREMITIES (HCC): ICD-10-CM

## 2025-01-16 DIAGNOSIS — I10 PRIMARY HYPERTENSION: ICD-10-CM

## 2025-01-16 DIAGNOSIS — R60.9 EDEMA, UNSPECIFIED TYPE: ICD-10-CM

## 2025-01-16 DIAGNOSIS — Z95.0 PACEMAKER: ICD-10-CM

## 2025-01-16 DIAGNOSIS — E78.2 MIXED HYPERLIPIDEMIA: ICD-10-CM

## 2025-01-16 DIAGNOSIS — I51.9 LV DYSFUNCTION: Primary | ICD-10-CM

## 2025-01-16 DIAGNOSIS — I25.10 CORONARY ARTERY DISEASE INVOLVING NATIVE CORONARY ARTERY OF NATIVE HEART WITHOUT ANGINA PECTORIS: ICD-10-CM

## 2025-01-16 RX ORDER — ISOSORBIDE MONONITRATE 30 MG/1
30 TABLET, EXTENDED RELEASE ORAL DAILY
Qty: 90 TABLET | Refills: 4 | Status: SHIPPED | OUTPATIENT
Start: 2025-01-16

## 2025-01-16 RX ORDER — ATORVASTATIN CALCIUM 20 MG/1
20 TABLET, FILM COATED ORAL DAILY
Qty: 90 TABLET | Refills: 4 | Status: SHIPPED | OUTPATIENT
Start: 2025-01-16

## 2025-01-16 NOTE — PATIENT INSTRUCTIONS
Limit salt in your diet- [id number] mg/day  Tomorrow take 2 Lasix tabs then go back to 1 a day  Stop Nifedipine - watch blood pressure  If BP goes above 160 can give 1/2 tab Nifedipine a day  Start Imdur 30 mg daily  Labs Monday- ask them to send a copy to Dr Cool  Follow up in 1 month    [facility]oco will try to set up iron infusions for you- will call you with information

## 2025-01-16 NOTE — PROGRESS NOTES
[facility]  Cardiac Consult     Referring Provider:  Micah Sauceda MD     Chief Complaint   Patient presents with    Shortness of Breath    Coronary Artery Disease    Hypertension        History of Present Illness:  83 y/o male formally followed by Dr. Pina seen for f/u for CAD, s/p CABG, hyperlipidemia and uncontrolled HTN.    He feels well. He denies anginal type chest discomfort. He has some discomfort in left lower chest when he awakes in the morning that resolves with belching. \"\"I have an acid stomach\". He denies any exertional chest discomfort. He does not take any meds for GERD. He was found to have an 18 beat run of NSVT on pacer check. Due to this an ECHO and myoview were obtained showing normal LV function and normal perfusion (EF=69%). He was seen by EP and options discussed including cardiac cath. We discussed options. I reviewed pacer checks with Dr. Cervantes. 8 episodes NSVT in 24 months. Jan 2021 18 beats, Aug 2020 6 beats March 220 4 beats. All asymptomatic.     Due to his age, abnormal renal function and overall risk we have deferred on cardiac cath.     He was found to have paroxysmal atrial fib on pacer check for several hours. Asymptomatic. Started on eliquis 2.5. Tolerating without any bleeding. His indigestion has resolved on pepcid. He does not want to see GI.      He was admitted to [facility] with kidney stone, worsening renal function and required stent placement.  LE edema worsening with urine protein/creat ratio now 9. Followed by nephrology and lisinopril added. Proteinuria decreased from 9=>3 grams.    He was seen July 2024 with an ECHO with normal LV function. Mild valvular regurgitation. Holter for palpitations with PVC burden of 6.2%, 5 beat NSVT, short PAT.    Last week he developed right flank pain.  He went to the emergency room for evaluation of possible kidney stone which apparently did not have.  He was admitted however with worsening renal function and

## 2025-01-17 DIAGNOSIS — D50.9 IRON DEFICIENCY ANEMIA, UNSPECIFIED IRON DEFICIENCY ANEMIA TYPE: Primary | ICD-10-CM

## 2025-01-20 ENCOUNTER — TELEPHONE (OUTPATIENT)
Dept: CARDIOLOGY CLINIC | Age: 89
End: 2025-01-20

## 2025-01-20 NOTE — TELEPHONE ENCOUNTER
Spoke to patient's wife Serena. His nephrology appt has been moved to Monday and he plans to have labs done on Thursday this week.

## 2025-01-20 NOTE — TELEPHONE ENCOUNTER
Pt' wife would like REYRN to know that pt has a fever, is coughing, and she will probably have to take him to urgent care so he is not having his blood work today.    They are planing on having his blood draw on Thursday.      Will this be okay?    Please advise.

## 2025-01-27 LAB — FERRITIN: 68 NG/ML (ref 30–400)

## 2025-01-30 ENCOUNTER — TELEPHONE (OUTPATIENT)
Dept: CARDIOLOGY CLINIC | Age: 89
End: 2025-01-30

## 2025-01-30 RX ORDER — HYDRALAZINE HYDROCHLORIDE 100 MG/1
50 TABLET, FILM COATED ORAL 3 TIMES DAILY
Qty: 225 TABLET | Refills: 0
Start: 2025-01-30

## 2025-01-30 NOTE — TELEPHONE ENCOUNTER
Spoke to patient's wife. She states his BP in the afternoon is elevated. Today 162/73 at 12 noon. She is asking if can give Hydralazine 25 mg in am but 100 mg at noon and in evening.    Per Dr Gómez- give Hydralazine 50 mg TID. Notified wife and recommended continuing to monitor BP and call if more issues. Has OV with [facility] 2/13. Patient's wife verbalized understanding.

## 2025-01-30 NOTE — TELEPHONE ENCOUNTER
Spoke to patient's wife. Every morning about an hour after patient takes his meds he feels terrible to the point where he can't do anything but lay down. BP is usually 90's/50's at this time. He will lay down for about an hour or so and then he will start feeling better. BP later in day will usually be 140's/80's.    He takes Coreg 25 mg, jardiance 10 mg, Lasix 20 mg, Hydralazine 100 mg, and Imdur 30 mg in the morning. Also takes Flomax.    Has had two recent hospitalizations with med changes. Most recent with the flu and CHF    Lisinopril stopped and Hydralazine added with first hospitalization and Imdur added at [facility] appt 1/16 for Sys HF.    Hydralazine was recently increased with last hospitalization from 100 mg twice a day to three times a day and lasix was increased from 20 mg once a day to twice a day for 30 days. Also took 80 mg for 2 days after discharge. Nifedipine stopped.    H/H low. Will be starting iron transfusions next Wed  CKD- seeing nephrology

## 2025-01-30 NOTE — TELEPHONE ENCOUNTER
I spoke with pt wife . She stated that pt added meds in the hospital. She stated that he is not having CP, Radaiting pain or any other s/s.

## 2025-01-30 NOTE — TELEPHONE ENCOUNTER
Wife states pt blood pressure is going to low in the morning about an hour after taking his medication. Pt feels like he's going to pass out and last for about an hour. Wife states she thinks he is taking to much medication. Bp was 90 something over 57. Please call to discuss.

## 2025-02-03 ENCOUNTER — TELEPHONE (OUTPATIENT)
Dept: CARDIOLOGY CLINIC | Age: 89
End: 2025-02-03

## 2025-02-03 RX ORDER — HYDRALAZINE HYDROCHLORIDE 100 MG/1
TABLET, FILM COATED ORAL
Qty: 225 TABLET | Refills: 0
Start: 2025-02-03

## 2025-02-03 NOTE — TELEPHONE ENCOUNTER
Patient would like Soco to call her today, she has still not been able to get Chester's BP regulated.  She states yesterday morning, an hour after taking his medicine it was 106/52 and then by 7:30PM it was 190/74.   He took his medicine at 8PM and by 9:30PM BP was 178/83.  Please call to discuss. [phone].

## 2025-02-03 NOTE — TELEPHONE ENCOUNTER
Patient's Bp's are still very different in am and pm  Hydralazine was decreased from 100 mg tid to 50 mg tid on Thursday last week.     This morning he waited to take his Hydralazine about an hour after taking his other morning meds and he did feel better with .     Wife concerned because evening BP's still very elevated.     Not on ACE or ARB due to kidneys and CCB stopped due to swelling in legs

## 2025-02-10 ENCOUNTER — TELEPHONE (OUTPATIENT)
Dept: CARDIOLOGY CLINIC | Age: 89
End: 2025-02-10

## 2025-02-10 NOTE — TELEPHONE ENCOUNTER
I called and spoke to Marge and provided the diagnosis code of N18.4.  I let her know that we are trying to fax over a new signed order to include the diagnosis code but have not been able to get it to go through the fax number provided.  Informed that we will continue to try to send it.  She verbalized understanding and denied any further questions and/or concerns.

## 2025-02-13 ENCOUNTER — OFFICE VISIT (OUTPATIENT)
Dept: CARDIOLOGY CLINIC | Age: 89
End: 2025-02-13

## 2025-02-13 VITALS
WEIGHT: 227.5 LBS | HEIGHT: 70 IN | SYSTOLIC BLOOD PRESSURE: 172 MMHG | OXYGEN SATURATION: 97 % | DIASTOLIC BLOOD PRESSURE: 80 MMHG | HEART RATE: 63 BPM | BODY MASS INDEX: 32.57 KG/M2

## 2025-02-13 DIAGNOSIS — N18.4 ANEMIA DUE TO STAGE 4 CHRONIC KIDNEY DISEASE (HCC): ICD-10-CM

## 2025-02-13 DIAGNOSIS — I48.0 PAROXYSMAL ATRIAL FIBRILLATION (HCC): ICD-10-CM

## 2025-02-13 DIAGNOSIS — I47.29 NSVT (NONSUSTAINED VENTRICULAR TACHYCARDIA) (HCC): ICD-10-CM

## 2025-02-13 DIAGNOSIS — Z95.1 HISTORY OF CORONARY ARTERY BYPASS GRAFT: ICD-10-CM

## 2025-02-13 DIAGNOSIS — D63.1 ANEMIA DUE TO STAGE 4 CHRONIC KIDNEY DISEASE (HCC): ICD-10-CM

## 2025-02-13 DIAGNOSIS — I25.10 CORONARY ARTERY DISEASE INVOLVING NATIVE CORONARY ARTERY OF NATIVE HEART WITHOUT ANGINA PECTORIS: Primary | ICD-10-CM

## 2025-02-13 DIAGNOSIS — R60.9 EDEMA, UNSPECIFIED TYPE: ICD-10-CM

## 2025-02-13 DIAGNOSIS — I10 PRIMARY HYPERTENSION: ICD-10-CM

## 2025-02-13 DIAGNOSIS — G47.30 SLEEP APNEA, UNSPECIFIED TYPE: ICD-10-CM

## 2025-02-13 NOTE — PATIENT INSTRUCTIONS
Labs Monday  No med changes for now  Appt Monday at 1:45      We are going to order the Entresto so we can see how much it is going to cost- do not pick it up or start it

## 2025-02-13 NOTE — PROGRESS NOTES
[facility]  Cardiac Consult     Referring Provider:  Micah Sauceda MD     Chief Complaint   Patient presents with    1 Month Follow-Up    Coronary Artery Disease    Hyperlipidemia    Hypertension    Shortness of Breath    Dizziness    Chest Pain        History of Present Illness:  83 y/o male formally followed by Dr. Pina seen for f/u for CAD, s/p CABG, hyperlipidemia and uncontrolled HTN.    He feels well. He denies anginal type chest discomfort. He has some discomfort in left lower chest when he awakes in the morning that resolves with belching. \"\"I have an acid stomach\". He denies any exertional chest discomfort. He does not take any meds for GERD. He was found to have an 18 beat run of NSVT on pacer check. Due to this an ECHO and myoview were obtained showing normal LV function and normal perfusion (EF=69%). He was seen by EP and options discussed including cardiac cath. We discussed options. I reviewed pacer checks with Dr. Cervantes. 8 episodes NSVT in 24 months. Jan 2021 18 beats, Aug 2020 6 beats March 220 4 beats. All asymptomatic.     Due to his age, abnormal renal function and overall risk we have deferred on cardiac cath.     He was found to have paroxysmal atrial fib on pacer check for several hours. Asymptomatic. Started on eliquis 2.5. Tolerating without any bleeding. His indigestion has resolved on pepcid. He does not want to see GI.      He was admitted to [facility] with kidney stone, worsening renal function and required stent placement.  LE edema worsening with urine protein/creat ratio now 9. Followed by nephrology and lisinopril added. Proteinuria decreased from 9=>3 grams.    He was seen July 2024 with an ECHO with normal LV function. Mild valvular regurgitation. Holter for palpitations with PVC burden of 6.2%, 5 beat NSVT, short PAT.    1/2025 he developed right flank pain.  He went to the emergency room for evaluation of possible kidney stone which apparently did not have.  He

## 2025-02-17 ENCOUNTER — OFFICE VISIT (OUTPATIENT)
Dept: CARDIOLOGY CLINIC | Age: 89
End: 2025-02-17

## 2025-02-17 ENCOUNTER — TELEPHONE (OUTPATIENT)
Dept: CARDIOLOGY CLINIC | Age: 89
End: 2025-02-17

## 2025-02-17 VITALS
DIASTOLIC BLOOD PRESSURE: 78 MMHG | WEIGHT: 230 LBS | HEIGHT: 70 IN | SYSTOLIC BLOOD PRESSURE: 152 MMHG | HEART RATE: 68 BPM | BODY MASS INDEX: 32.93 KG/M2 | OXYGEN SATURATION: 99 %

## 2025-02-17 DIAGNOSIS — N18.9 CHRONIC RENAL IMPAIRMENT, UNSPECIFIED CKD STAGE: ICD-10-CM

## 2025-02-17 DIAGNOSIS — I51.9 LV DYSFUNCTION: ICD-10-CM

## 2025-02-17 DIAGNOSIS — I25.10 CORONARY ARTERY DISEASE INVOLVING NATIVE CORONARY ARTERY OF NATIVE HEART WITHOUT ANGINA PECTORIS: ICD-10-CM

## 2025-02-17 DIAGNOSIS — E78.2 MIXED HYPERLIPIDEMIA: ICD-10-CM

## 2025-02-17 DIAGNOSIS — M19.90 OSTEOARTHRITIS, UNSPECIFIED OSTEOARTHRITIS TYPE, UNSPECIFIED SITE: ICD-10-CM

## 2025-02-17 DIAGNOSIS — I10 PRIMARY HYPERTENSION: Primary | ICD-10-CM

## 2025-02-17 DIAGNOSIS — I48.0 PAROXYSMAL ATRIAL FIBRILLATION (HCC): ICD-10-CM

## 2025-02-17 LAB
ANION GAP SERPL CALCULATED.3IONS-SCNC: 10 MMOL/L (ref 4–16)
BUN BLDV-MCNC: 43 MG/DL (ref 8–26)
CALCIUM SERPL-MCNC: 9 MG/DL (ref 8.5–10.4)
CHLORIDE BLD-SCNC: 104 MMOL/L (ref 98–111)
CO2: 25 MMOL/L (ref 21–31)
CREAT SERPL-MCNC: 3.24 MG/DL (ref 0.7–1.3)
EGFR (CKD-EPI): 18 ML/MIN/1.73 M2
GLUCOSE BLD-MCNC: 204 MG/DL (ref 70–99)
POTASSIUM SERPL-SCNC: 4.4 MMOL/L (ref 3.6–5.1)
SODIUM BLD-SCNC: 139 MMOL/L (ref 135–145)

## 2025-02-17 RX ORDER — HYDRALAZINE HYDROCHLORIDE 100 MG/1
TABLET, FILM COATED ORAL
Qty: 225 TABLET | Refills: 0 | Status: SHIPPED | OUTPATIENT
Start: 2025-02-17

## 2025-02-17 RX ORDER — ATORVASTATIN CALCIUM 20 MG/1
20 TABLET, FILM COATED ORAL DAILY
Qty: 90 TABLET | Refills: 4 | Status: SHIPPED | OUTPATIENT
Start: 2025-02-17

## 2025-02-17 RX ORDER — ISOSORBIDE MONONITRATE 30 MG/1
30 TABLET, EXTENDED RELEASE ORAL DAILY
Qty: 90 TABLET | Refills: 4 | Status: SHIPPED | OUTPATIENT
Start: 2025-02-17

## 2025-02-17 NOTE — PATIENT INSTRUCTIONS
Stop entresto  Call for any changes  Continue with iron infusions   Follow up in 2 months  Forward labs when they get done  Repeat labs on 2/27

## 2025-02-17 NOTE — PROGRESS NOTES
(IMDUR) 30 MG extended release tablet Take 1 tablet by mouth daily Yes Tremayne Cool MD   Multiple Vitamin (MULTIVITAMIN ADULT PO) Take by mouth Yes ProviderAbbi MD   carvedilol (COREG) 25 MG tablet TAKE 1 TABLET BY MOUTH TWICE A DAY Yes Elias Collins MD   Coenzyme Q10 (CO Q-10) 100 MG CAPS Take 2 capsules by mouth daily Yes Abbi Torres MD   Probiotic Product (PROBIOTIC-10 PO) Take by mouth daily Yes ProviderAbbi MD   famotidine (PEPCID) 20 MG tablet Take 1 tablet by mouth daily Yes ProviderAbbi MD   alprazolam (XANAX) 0.25 MG tablet Take 1 tablet by mouth 3 times daily as needed. TAKES 1/2 TAB DAILY OR TWO TIMES A DAY AS NEEDED Yes Abbi Torres MD   sacubitril-valsartan (ENTRESTO) 24-26 MG per tablet Take 1 tablet by mouth 2 times daily  Patient not taking: Reported on 2/17/2025  Tremayne Cool MD   tamsulosin (FLOMAX) 0.4 MG capsule TAKE 1 CAPSULE BY MOUTH EVERYDAY AT BEDTIME  Patient not taking: Reported on 2/13/2025  Elias Collins MD       [x] Medications and dosages reviewed.    ROS:  [x]Full ROS obtained and negative except as mentioned in HPI       Physical Examination:    Vitals:    02/17/25 1332   BP: (!) 152/78   Pulse: 68   SpO2: 99%        BP (!) 152/78 (Site: Left Upper Arm, Position: Sitting, Cuff Size: Large Adult)   Pulse 68   Ht 1.778 m (5' 10\")   Wt 104.3 kg (230 lb)   SpO2 99%   BMI 33.00 kg/m²        GENERAL: Elderly overweight male in NAD  NEUROLOGICAL: Alert and oriented, no motor abnormalities  PSYCH: Calm affect  SKIN: Warm and dry, No rash  HEENT: Normocephalic, Sclera non-icteric, mucus membranes moist  NECK: supple, JVP normal  CAROTID: Normal upstroke, no bruits  CARDIAC: Normal PMI, regular rate and rhythm, normal S1S2, No murmur, rub, or gallop  RESPIRATORY: Normal respiratory effort, clear to auscultation bilaterally  EXTREMITIES: 1+ left lower extremity edema  MUSCULOSKELETAL: No joint swelling or tenderness, no chest

## 2025-03-31 ENCOUNTER — RESULTS FOLLOW-UP (OUTPATIENT)
Dept: CARDIOLOGY CLINIC | Age: 89
End: 2025-03-31

## 2025-03-31 ENCOUNTER — TELEPHONE (OUTPATIENT)
Dept: CARDIOLOGY CLINIC | Age: 89
End: 2025-03-31

## 2025-03-31 LAB
BASOPHILS ABSOLUTE: 0 THOU/MCL (ref 0–0.2)
BASOPHILS ABSOLUTE: 1 %
EOSINOPHILS ABSOLUTE: 0.1 THOU/MCL (ref 0.03–0.45)
EOSINOPHILS RELATIVE PERCENT: 2 %
FERRITIN: 240 NG/ML (ref 30–336.2)
HCT VFR BLD CALC: 28.4 % (ref 40–50)
HEMOGLOBIN: 9.3 G/DL (ref 13.5–16.5)
IRON % SATURATION: 19 % (ref 15–55)
IRON: 45 MCG/DL (ref 50–212)
LYMPHOCYTES ABSOLUTE: 1 THOU/MCL (ref 1–4)
LYMPHOCYTES RELATIVE PERCENT: 19 %
MCH RBC QN AUTO: 29.9 PG (ref 27–33)
MCHC RBC AUTO-ENTMCNC: 32.6 G/DL (ref 32–36)
MCV RBC AUTO: 91.8 FL (ref 82–97)
MONOCYTES ABSOLUTE: 0.5 THOU/MCL (ref 0.2–0.9)
MONOCYTES RELATIVE PERCENT: 9 %
NEUTROPHILS ABSOLUTE: 3.8 THOU/MCL (ref 1.8–7.7)
PDW BLD-RTO: 18.7 %
PLATELET # BLD: 160 THOU/MCL (ref 140–375)
PMV BLD AUTO: 9.2 FL (ref 7.4–11.5)
RBC # BLD: 3.1 MIL/MCL (ref 4.4–5.8)
SEG NEUTROPHILS: 69 %
TOTAL IRON BINDING CAPACITY: 239 MCG/DL (ref 260–450)
TRANSFERRIN: 171 MG/DL (ref 203–362)
WBC # BLD: 5.4 THOU/MCL (ref 3.6–10.5)

## 2025-03-31 NOTE — TELEPHONE ENCOUNTER
Per LES - Ferritin level is normal so no need to take.      I called and spoke to Serena and advised of the above.  She verbalized understanding.  Denied any further questions and/or concerns.

## 2025-04-07 ENCOUNTER — OFFICE VISIT (OUTPATIENT)
Dept: CARDIOLOGY CLINIC | Age: 89
End: 2025-04-07
Payer: MEDICARE

## 2025-04-07 VITALS
HEART RATE: 63 BPM | HEIGHT: 70 IN | WEIGHT: 225 LBS | SYSTOLIC BLOOD PRESSURE: 140 MMHG | DIASTOLIC BLOOD PRESSURE: 70 MMHG | OXYGEN SATURATION: 97 % | BODY MASS INDEX: 32.21 KG/M2

## 2025-04-07 DIAGNOSIS — N18.30 STAGE 3 CHRONIC KIDNEY DISEASE, UNSPECIFIED WHETHER STAGE 3A OR 3B CKD (HCC): ICD-10-CM

## 2025-04-07 DIAGNOSIS — I10 PRIMARY HYPERTENSION: Primary | ICD-10-CM

## 2025-04-07 DIAGNOSIS — I25.10 CORONARY ARTERY DISEASE INVOLVING NATIVE CORONARY ARTERY OF NATIVE HEART WITHOUT ANGINA PECTORIS: ICD-10-CM

## 2025-04-07 DIAGNOSIS — R60.9 EDEMA, UNSPECIFIED TYPE: ICD-10-CM

## 2025-04-07 DIAGNOSIS — I48.0 PAROXYSMAL ATRIAL FIBRILLATION (HCC): ICD-10-CM

## 2025-04-07 DIAGNOSIS — E78.2 MIXED HYPERLIPIDEMIA: ICD-10-CM

## 2025-04-07 PROCEDURE — 1159F MED LIST DOCD IN RCRD: CPT | Performed by: INTERNAL MEDICINE

## 2025-04-07 PROCEDURE — 99214 OFFICE O/P EST MOD 30 MIN: CPT | Performed by: INTERNAL MEDICINE

## 2025-04-07 PROCEDURE — 1123F ACP DISCUSS/DSCN MKR DOCD: CPT | Performed by: INTERNAL MEDICINE

## 2025-04-07 NOTE — PROGRESS NOTES
[facility]  Cardiac Consult     Referring Provider:  Micah Sauceda MD     Chief Complaint   Patient presents with    Follow-up    Coronary Artery Disease    Hypertension    Hyperlipidemia        History of Present Illness:  81 y/o male formally followed by Dr. Pina seen for f/u for CAD, s/p CABG, hyperlipidemia and uncontrolled HTN.    He feels well. He denies anginal type chest discomfort. He has some discomfort in left lower chest when he awakes in the morning that resolves with belching. \"\"I have an acid stomach\". He denies any exertional chest discomfort. He does not take any meds for GERD. He was found to have an 18 beat run of NSVT on pacer check. Due to this an ECHO and myoview were obtained showing normal LV function and normal perfusion (EF=69%). He was seen by EP and options discussed including cardiac cath. We discussed options. I reviewed pacer checks with Dr. Cervantes. 8 episodes NSVT in 24 months. Jan 2021 18 beats, Aug 2020 6 beats March 220 4 beats. All asymptomatic.     Due to his age, abnormal renal function and overall risk we have deferred on cardiac cath.     He was found to have paroxysmal atrial fib on pacer check for several hours. Asymptomatic. Started on eliquis 2.5. Tolerating without any bleeding. His indigestion has resolved on pepcid. He does not want to see GI.      He was admitted to [facility] with kidney stone, worsening renal function and required stent placement.  LE edema worsening with urine protein/creat ratio now 9. Followed by nephrology and lisinopril added. Proteinuria decreased from 9=>3 grams.    He was seen July 2024 with an ECHO with normal LV function. Mild valvular regurgitation. Holter for palpitations with PVC burden of 6.2%, 5 beat NSVT, short PAT.    1/2025 he developed right flank pain.  He went to the emergency room for evaluation of possible kidney stone which apparently did not have.  He was admitted however with worsening renal function and

## 2025-04-28 ENCOUNTER — TELEPHONE (OUTPATIENT)
Dept: CARDIOLOGY CLINIC | Age: 89
End: 2025-04-28

## 2025-04-28 NOTE — TELEPHONE ENCOUNTER
Pt Optometrists would [place]-3,  3 Capsulets per day pt would like to know if they can take it with the ELIQUIS ?    Pls advise

## 2025-04-28 NOTE — TELEPHONE ENCOUNTER
Patient is starting to get macular degeneration and pts optometrist recommended vitamins. NuMacula vitamin that has omega 3 in it.

## 2025-04-28 NOTE — TELEPHONE ENCOUNTER
Called patient. He asked that I call his wife Serena. Called Serena and left message asking her to call back to discuss.

## 2025-06-08 ENCOUNTER — HOSPITAL ENCOUNTER (INPATIENT)
Age: 89
LOS: 11 days | Discharge: HOME | End: 2025-06-19
Attending: STUDENT IN AN ORGANIZED HEALTH CARE EDUCATION/TRAINING PROGRAM | Admitting: STUDENT IN AN ORGANIZED HEALTH CARE EDUCATION/TRAINING PROGRAM
Payer: MEDICARE

## 2025-06-08 DIAGNOSIS — K62.5 BRIGHT RED BLOOD PER RECTUM: ICD-10-CM

## 2025-06-08 DIAGNOSIS — Z98.890 STATUS POST THORACENTESIS: ICD-10-CM

## 2025-06-08 DIAGNOSIS — I48.0 PAROXYSMAL ATRIAL FIBRILLATION (HCC): Primary | ICD-10-CM

## 2025-06-08 PROBLEM — R57.8 HEMORRHAGIC SHOCK (HCC): Status: ACTIVE | Noted: 2025-06-08

## 2025-06-08 PROBLEM — K92.2 LOWER GI BLEED: Status: ACTIVE | Noted: 2025-06-08

## 2025-06-08 LAB
ABO/RH: NORMAL
ALBUMIN SERPL-MCNC: 3.2 G/DL (ref 3.4–5)
ALBUMIN/GLOB SERPL: 1.7 {RATIO} (ref 1.1–2.2)
ALP SERPL-CCNC: 64 U/L (ref 40–129)
ALT SERPL-CCNC: 8 U/L (ref 10–40)
ANION GAP SERPL CALCULATED.3IONS-SCNC: 10 MMOL/L (ref 3–16)
ANTIBODY SCREEN: NORMAL
AST SERPL-CCNC: 11 U/L (ref 15–37)
BASOPHILS # BLD: 0 K/UL (ref 0–0.2)
BASOPHILS NFR BLD: 0.7 %
BILIRUB SERPL-MCNC: 0.3 MG/DL (ref 0–1)
BUN SERPL-MCNC: 48 MG/DL (ref 7–20)
CALCIUM SERPL-MCNC: 8.2 MG/DL (ref 8.3–10.6)
CHLORIDE SERPL-SCNC: 105 MMOL/L (ref 99–110)
CO2 SERPL-SCNC: 24 MMOL/L (ref 21–32)
CREAT SERPL-MCNC: 3 MG/DL (ref 0.8–1.3)
DEPRECATED RDW RBC AUTO: 16.6 % (ref 12.4–15.4)
EOSINOPHIL # BLD: 0.1 K/UL (ref 0–0.6)
EOSINOPHIL NFR BLD: 2.1 %
GFR SERPLBLD CREATININE-BSD FMLA CKD-EPI: 19 ML/MIN/{1.73_M2}
GLUCOSE SERPL-MCNC: 136 MG/DL (ref 70–99)
HAPTOGLOB SERPL-MCNC: 129 MG/DL (ref 30–200)
HCT VFR BLD AUTO: 20.5 % (ref 40.5–52.5)
HCT VFR BLD AUTO: 21.4 % (ref 40.5–52.5)
HCT VFR BLD AUTO: 21.6 % (ref 40.5–52.5)
HCT VFR BLD AUTO: 21.9 % (ref 40.5–52.5)
HGB BLD-MCNC: 6.8 G/DL (ref 13.5–17.5)
HGB BLD-MCNC: 7.1 G/DL (ref 13.5–17.5)
HGB BLD-MCNC: 7.2 G/DL (ref 13.5–17.5)
IMM RETICS NFR: 0.48 % (ref 0.21–0.37)
INR PPP: 1.25 (ref 0.85–1.15)
IRON SATN MFR SERPL: 18 % (ref 20–50)
IRON SERPL-MCNC: 32 UG/DL (ref 59–158)
LDH SERPL L TO P-CCNC: 131 U/L (ref 100–190)
LYMPHOCYTES # BLD: 1.1 K/UL (ref 1–5.1)
LYMPHOCYTES NFR BLD: 19.2 %
MCH RBC QN AUTO: 29.8 PG (ref 26–34)
MCHC RBC AUTO-ENTMCNC: 32.9 G/DL (ref 31–36)
MCV RBC AUTO: 90.6 FL (ref 80–100)
MONOCYTES # BLD: 0.5 K/UL (ref 0–1.3)
MONOCYTES NFR BLD: 8.5 %
NEUTROPHILS # BLD: 4 K/UL (ref 1.7–7.7)
NEUTROPHILS NFR BLD: 69.5 %
PLATELET # BLD AUTO: 150 K/UL (ref 135–450)
PMV BLD AUTO: 9 FL (ref 5–10.5)
POTASSIUM SERPL-SCNC: 4.1 MMOL/L (ref 3.5–5.1)
PROT SERPL-MCNC: 5.1 G/DL (ref 6.4–8.2)
PROTHROMBIN TIME: 15.9 SEC (ref 11.9–14.9)
RBC # BLD AUTO: 2.38 M/UL (ref 4.2–5.9)
RETICS # AUTO: 0.04 M/UL
RETICS/RBC NFR AUTO: 1.72 % (ref 0.5–2.18)
SODIUM SERPL-SCNC: 139 MMOL/L (ref 136–145)
TIBC SERPL-MCNC: 175 UG/DL (ref 260–445)
WBC # BLD AUTO: 5.7 K/UL (ref 4–11)

## 2025-06-08 PROCEDURE — 36415 COLL VENOUS BLD VENIPUNCTURE: CPT

## 2025-06-08 PROCEDURE — 3E033XZ INTRODUCTION OF VASOPRESSOR INTO PERIPHERAL VEIN, PERCUTANEOUS APPROACH: ICD-10-PCS | Performed by: INTERNAL MEDICINE

## 2025-06-08 PROCEDURE — 86901 BLOOD TYPING SEROLOGIC RH(D): CPT

## 2025-06-08 PROCEDURE — 2500000003 HC RX 250 WO HCPCS: Performed by: STUDENT IN AN ORGANIZED HEALTH CARE EDUCATION/TRAINING PROGRAM

## 2025-06-08 PROCEDURE — 83615 LACTATE (LD) (LDH) ENZYME: CPT

## 2025-06-08 PROCEDURE — 93005 ELECTROCARDIOGRAM TRACING: CPT | Performed by: STUDENT IN AN ORGANIZED HEALTH CARE EDUCATION/TRAINING PROGRAM

## 2025-06-08 PROCEDURE — 83550 IRON BINDING TEST: CPT

## 2025-06-08 PROCEDURE — 6360000002 HC RX W HCPCS: Performed by: STUDENT IN AN ORGANIZED HEALTH CARE EDUCATION/TRAINING PROGRAM

## 2025-06-08 PROCEDURE — 6370000000 HC RX 637 (ALT 250 FOR IP): Performed by: STUDENT IN AN ORGANIZED HEALTH CARE EDUCATION/TRAINING PROGRAM

## 2025-06-08 PROCEDURE — 86923 COMPATIBILITY TEST ELECTRIC: CPT

## 2025-06-08 PROCEDURE — 85025 COMPLETE CBC W/AUTO DIFF WBC: CPT

## 2025-06-08 PROCEDURE — 2580000003 HC RX 258: Performed by: STUDENT IN AN ORGANIZED HEALTH CARE EDUCATION/TRAINING PROGRAM

## 2025-06-08 PROCEDURE — 30233N1 TRANSFUSION OF NONAUTOLOGOUS RED BLOOD CELLS INTO PERIPHERAL VEIN, PERCUTANEOUS APPROACH: ICD-10-PCS | Performed by: INTERNAL MEDICINE

## 2025-06-08 PROCEDURE — 85014 HEMATOCRIT: CPT

## 2025-06-08 PROCEDURE — 85610 PROTHROMBIN TIME: CPT

## 2025-06-08 PROCEDURE — 2000000000 HC ICU R&B

## 2025-06-08 PROCEDURE — 86850 RBC ANTIBODY SCREEN: CPT

## 2025-06-08 PROCEDURE — 83010 ASSAY OF HAPTOGLOBIN QUANT: CPT

## 2025-06-08 PROCEDURE — 83540 ASSAY OF IRON: CPT

## 2025-06-08 PROCEDURE — 85045 AUTOMATED RETICULOCYTE COUNT: CPT

## 2025-06-08 PROCEDURE — 80053 COMPREHEN METABOLIC PANEL: CPT

## 2025-06-08 PROCEDURE — 86900 BLOOD TYPING SEROLOGIC ABO: CPT

## 2025-06-08 PROCEDURE — 36430 TRANSFUSION BLD/BLD COMPNT: CPT

## 2025-06-08 PROCEDURE — 6370000000 HC RX 637 (ALT 250 FOR IP): Performed by: INTERNAL MEDICINE

## 2025-06-08 PROCEDURE — P9016 RBC LEUKOCYTES REDUCED: HCPCS

## 2025-06-08 PROCEDURE — 85018 HEMOGLOBIN: CPT

## 2025-06-08 RX ORDER — ONDANSETRON 2 MG/ML
4 INJECTION INTRAMUSCULAR; INTRAVENOUS EVERY 6 HOURS PRN
Status: DISCONTINUED | OUTPATIENT
Start: 2025-06-08 | End: 2025-06-19 | Stop reason: HOSPADM

## 2025-06-08 RX ORDER — ACETAMINOPHEN 325 MG/1
650 TABLET ORAL EVERY 6 HOURS PRN
Status: DISCONTINUED | OUTPATIENT
Start: 2025-06-08 | End: 2025-06-19 | Stop reason: HOSPADM

## 2025-06-08 RX ORDER — MAGNESIUM SULFATE IN WATER 40 MG/ML
2000 INJECTION, SOLUTION INTRAVENOUS PRN
Status: DISCONTINUED | OUTPATIENT
Start: 2025-06-08 | End: 2025-06-08

## 2025-06-08 RX ORDER — FUROSEMIDE 20 MG/1
20 TABLET ORAL 2 TIMES DAILY
Status: DISCONTINUED | OUTPATIENT
Start: 2025-06-08 | End: 2025-06-10

## 2025-06-08 RX ORDER — ACETAMINOPHEN 650 MG/1
650 SUPPOSITORY RECTAL EVERY 6 HOURS PRN
Status: DISCONTINUED | OUTPATIENT
Start: 2025-06-08 | End: 2025-06-19 | Stop reason: HOSPADM

## 2025-06-08 RX ORDER — CARVEDILOL 25 MG/1
25 TABLET ORAL 2 TIMES DAILY WITH MEALS
Status: DISCONTINUED | OUTPATIENT
Start: 2025-06-08 | End: 2025-06-18

## 2025-06-08 RX ORDER — SODIUM CHLORIDE 9 MG/ML
INJECTION, SOLUTION INTRAVENOUS PRN
Status: DISCONTINUED | OUTPATIENT
Start: 2025-06-08 | End: 2025-06-18

## 2025-06-08 RX ORDER — SODIUM CHLORIDE 0.9 % (FLUSH) 0.9 %
5-40 SYRINGE (ML) INJECTION EVERY 12 HOURS SCHEDULED
Status: DISCONTINUED | OUTPATIENT
Start: 2025-06-08 | End: 2025-06-19 | Stop reason: HOSPADM

## 2025-06-08 RX ORDER — SODIUM CHLORIDE 9 MG/ML
INJECTION, SOLUTION INTRAVENOUS PRN
Status: DISCONTINUED | OUTPATIENT
Start: 2025-06-08 | End: 2025-06-19 | Stop reason: HOSPADM

## 2025-06-08 RX ORDER — ONDANSETRON 4 MG/1
4 TABLET, ORALLY DISINTEGRATING ORAL EVERY 8 HOURS PRN
Status: DISCONTINUED | OUTPATIENT
Start: 2025-06-08 | End: 2025-06-19 | Stop reason: HOSPADM

## 2025-06-08 RX ORDER — POTASSIUM CHLORIDE 1500 MG/1
40 TABLET, EXTENDED RELEASE ORAL PRN
Status: DISCONTINUED | OUTPATIENT
Start: 2025-06-08 | End: 2025-06-08

## 2025-06-08 RX ORDER — POLYETHYLENE GLYCOL 3350 17 G/17G
17 POWDER, FOR SOLUTION ORAL DAILY PRN
Status: DISCONTINUED | OUTPATIENT
Start: 2025-06-08 | End: 2025-06-19 | Stop reason: HOSPADM

## 2025-06-08 RX ORDER — POTASSIUM CHLORIDE 7.45 MG/ML
10 INJECTION INTRAVENOUS PRN
Status: DISCONTINUED | OUTPATIENT
Start: 2025-06-08 | End: 2025-06-08

## 2025-06-08 RX ORDER — ATORVASTATIN CALCIUM 20 MG/1
20 TABLET, FILM COATED ORAL DAILY
Status: DISCONTINUED | OUTPATIENT
Start: 2025-06-08 | End: 2025-06-19 | Stop reason: HOSPADM

## 2025-06-08 RX ORDER — TAMSULOSIN HYDROCHLORIDE 0.4 MG/1
0.4 CAPSULE ORAL DAILY
Status: DISCONTINUED | OUTPATIENT
Start: 2025-06-08 | End: 2025-06-19 | Stop reason: HOSPADM

## 2025-06-08 RX ORDER — SODIUM CHLORIDE, SODIUM LACTATE, POTASSIUM CHLORIDE, CALCIUM CHLORIDE 600; 310; 30; 20 MG/100ML; MG/100ML; MG/100ML; MG/100ML
INJECTION, SOLUTION INTRAVENOUS CONTINUOUS
Status: DISCONTINUED | OUTPATIENT
Start: 2025-06-08 | End: 2025-06-10

## 2025-06-08 RX ORDER — HYDRALAZINE HYDROCHLORIDE 100 MG/1
100 TABLET, FILM COATED ORAL 2 TIMES DAILY
Status: DISCONTINUED | OUTPATIENT
Start: 2025-06-08 | End: 2025-06-19 | Stop reason: HOSPADM

## 2025-06-08 RX ORDER — SODIUM CHLORIDE 0.9 % (FLUSH) 0.9 %
5-40 SYRINGE (ML) INJECTION PRN
Status: DISCONTINUED | OUTPATIENT
Start: 2025-06-08 | End: 2025-06-19 | Stop reason: HOSPADM

## 2025-06-08 RX ORDER — ALPRAZOLAM 0.25 MG
0.25 TABLET ORAL 3 TIMES DAILY PRN
Status: DISCONTINUED | OUTPATIENT
Start: 2025-06-08 | End: 2025-06-19 | Stop reason: HOSPADM

## 2025-06-08 RX ORDER — NOREPINEPHRINE BITARTRATE 0.06 MG/ML
1-100 INJECTION, SOLUTION INTRAVENOUS CONTINUOUS
Status: DISCONTINUED | OUTPATIENT
Start: 2025-06-08 | End: 2025-06-09

## 2025-06-08 RX ORDER — ISOSORBIDE MONONITRATE 30 MG/1
30 TABLET, EXTENDED RELEASE ORAL DAILY
Status: DISCONTINUED | OUTPATIENT
Start: 2025-06-08 | End: 2025-06-19 | Stop reason: HOSPADM

## 2025-06-08 RX ORDER — PANTOPRAZOLE SODIUM 40 MG/10ML
40 INJECTION, POWDER, LYOPHILIZED, FOR SOLUTION INTRAVENOUS 2 TIMES DAILY
Status: DISCONTINUED | OUTPATIENT
Start: 2025-06-08 | End: 2025-06-19 | Stop reason: HOSPADM

## 2025-06-08 RX ADMIN — ATORVASTATIN CALCIUM 20 MG: 20 TABLET, FILM COATED ORAL at 09:35

## 2025-06-08 RX ADMIN — POLYETHYLENE GLYCOL-3350 AND ELECTROLYTES 4000 ML: 236; 6.74; 5.86; 2.97; 22.74 POWDER, FOR SOLUTION ORAL at 09:43

## 2025-06-08 RX ADMIN — SODIUM CHLORIDE, PRESERVATIVE FREE 10 ML: 5 INJECTION INTRAVENOUS at 09:35

## 2025-06-08 RX ADMIN — PANTOPRAZOLE SODIUM 40 MG: 40 INJECTION, POWDER, FOR SOLUTION INTRAVENOUS at 21:16

## 2025-06-08 RX ADMIN — FUROSEMIDE 20 MG: 20 TABLET ORAL at 09:35

## 2025-06-08 RX ADMIN — ISOSORBIDE MONONITRATE 30 MG: 30 TABLET, EXTENDED RELEASE ORAL at 09:35

## 2025-06-08 RX ADMIN — ONDANSETRON 4 MG: 2 INJECTION, SOLUTION INTRAMUSCULAR; INTRAVENOUS at 21:31

## 2025-06-08 RX ADMIN — SODIUM CHLORIDE, PRESERVATIVE FREE 10 ML: 5 INJECTION INTRAVENOUS at 21:17

## 2025-06-08 RX ADMIN — HYDRALAZINE HYDROCHLORIDE 100 MG: 100 TABLET ORAL at 09:35

## 2025-06-08 RX ADMIN — CARVEDILOL 25 MG: 25 TABLET, FILM COATED ORAL at 09:35

## 2025-06-08 RX ADMIN — PANTOPRAZOLE SODIUM 40 MG: 40 INJECTION, POWDER, FOR SOLUTION INTRAVENOUS at 09:35

## 2025-06-08 RX ADMIN — SODIUM CHLORIDE, SODIUM LACTATE, POTASSIUM CHLORIDE, AND CALCIUM CHLORIDE: .6; .31; .03; .02 INJECTION, SOLUTION INTRAVENOUS at 11:09

## 2025-06-08 RX ADMIN — TAMSULOSIN HYDROCHLORIDE 0.4 MG: 0.4 CAPSULE ORAL at 09:35

## 2025-06-08 RX ADMIN — FUROSEMIDE 20 MG: 20 TABLET ORAL at 17:43

## 2025-06-08 RX ADMIN — NOREPINEPHRINE BITARTRATE 1 MCG/MIN: 64 SOLUTION INTRAVENOUS at 12:38

## 2025-06-08 ASSESSMENT — PAIN SCALES - GENERAL
PAINLEVEL_OUTOF10: 0

## 2025-06-08 NOTE — PROGRESS NOTES
EKG completed d/t patient having dizziness and feeling palpitations.  EKG reads \"Atrial paced rhythm with prolonged AV conduction with premature atrial complexes.  Right bundle branch block cannot rule out inferior infarct, age undetermined\"

## 2025-06-08 NOTE — H&P
Hospital Medicine History & Physical        Name:  Chester Castano Jr.  /Age/Sex: 1935  (89 y.o. male)  MRN & CSN:  [id number] & [id number]     PCP: Micah Sauceda MD    Date of Admission: 2025    Date of Service: Patient seen/examined on 2025    Patient Status:  Inpatient - Patient will most likely require more than 48 hours of treatment and requires intensive medical treatment/monitoring     Chief Complaint:  No chief complaint on file.        History Of Present Illness:     89 y.o. male with PMHx of CAD, HTN, HLD, DM II, PAF who presented to [facility] with complaints of rectal bleeding.    Patient admitted to bright red blood in the [facility] after going to the bathroom today. Painless bleeding, no pain in rectum. Never had this happen to him before. Denies abdominal pain. No change in appetite.    Denies chest pain, shortness of breath, nausea, vomiting, fever, or chills.    Admits to some edema in legs, but this is chronic.      Past Medical History:          Diagnosis Date    Anxiety     Arthritis     CAD (coronary artery disease)     MI X 2 ,     Cancer (HCC)     BASAL CELL    GERD (gastroesophageal reflux disease)     Hyperlipidemia     Hypertension        Past Surgical History:          Procedure Laterality Date    APPENDECTOMY      CARDIAC SURGERY      3 V    CARPAL TUNNEL RELEASE      BILAT    CORONARY ANGIOPLASTY WITH STENT PLACEMENT          KNEE ARTHROSCOPY      RT    MOHS SURGERY      on his face    PACEMAKER PLACEMENT  10/04/2018    dual chamber    PROSTATE SURGERY      TONSILLECTOMY      TURP      TURP  2010    CYSTO INTERNAL URETHROTOMY       Medications Prior to Admission:      Prior to Admission medications    Medication Sig Start Date End Date Taking? Authorizing Provider   furosemide (LASIX) 20 MG tablet Take 1 tablet by mouth daily 3/28/25   Gladys Rivera PA-C   atorvastatin (LIPITOR) 20 MG tablet Take 1 tablet by mouth daily

## 2025-06-08 NOTE — PROGRESS NOTES
Patient got up with assistance to use the bathroom and was complaining of dizziness and feeling like he was going to pass out.  He also was complaining of feeling palpitations.  Blood pressure taken and it was 89/52,  stat EKG was ordered d/t dizziness and palpitations.  Patient stated that he felt like he was dying.  Blood transfusion started and blood pressures continued to trend down, doctor notified.

## 2025-06-08 NOTE — PROGRESS NOTES
Received pt from , Pt A&Ox4 in bed, BP 98/54, Spo2 98 on RA, SR, PRBC going at 125, NS at 75.

## 2025-06-08 NOTE — CONSULTS
[facility]  GI Consult Note    Patient: Chester Castano Jr.  : 1935  Acct#:      Date:  2025    Subjective:       History of Present Illness  Patient is a 89 y.o.  male whom we are consulted for brbpr.    Pt on eliquis, hx afib,pacemaker, cad, renal insuff. Overall active, mows own lawn at age 89.  No prior hx gi bleed.  Yesterday 6 pm developed painless brbpr, had 4 total episodes, only scant amount this am. No abd pain. No dizzyness. Last colonoscpoy 5-10 years ago for hx polyps, after that stopped colonoscopy due to age, last report not in epic.     Past Medical History:   Diagnosis Date    Anxiety     Arthritis     CAD (coronary artery disease)     MI X 2 ,     Cancer (HCC)     BASAL CELL    GERD (gastroesophageal reflux disease)     Hyperlipidemia     Hypertension       Past Surgical History:   Procedure Laterality Date    APPENDECTOMY      CARDIAC SURGERY      3 V    CARPAL TUNNEL RELEASE      BILAT    CORONARY ANGIOPLASTY WITH STENT PLACEMENT          KNEE ARTHROSCOPY      RT    MOHS SURGERY      on his face    PACEMAKER PLACEMENT  10/04/2018    dual chamber    PROSTATE SURGERY      TONSILLECTOMY      TURP      TURP  2010    CYSTO INTERNAL URETHROTOMY        Admission Meds  No current facility-administered medications on file prior to encounter.     Current Outpatient Medications on File Prior to Encounter   Medication Sig Dispense Refill    furosemide (LASIX) 20 MG tablet Take 1 tablet by mouth daily 90 tablet 1    atorvastatin (LIPITOR) 20 MG tablet Take 1 tablet by mouth daily 90 tablet 4    apixaban (ELIQUIS) 2.5 MG TABS tablet Take 1 tablet by mouth 2 times daily 180 tablet 4    hydrALAZINE (APRESOLINE) 100 MG tablet Take 1 tab by mouth every day at noon and 5 pm 225 tablet 0    isosorbide mononitrate (IMDUR) 30 MG extended release tablet Take 1 tablet by mouth daily 90 tablet 4    Multiple Vitamin (MULTIVITAMIN ADULT PO) Take by mouth

## 2025-06-08 NOTE — PLAN OF CARE
Patient admitted for lower GIB. Skeleton orders placed with consult to GI. Defer med rec and remaining care to primary team.     Cecilio Bustillos MD  Night Physician

## 2025-06-08 NOTE — PROGRESS NOTES
Consent form signed for blood transfusion and placed in paper chart.  Risks explained to patient and family, verbalized understanding.

## 2025-06-08 NOTE — CONSENT
Informed Consent for Blood Component Transfusion Note    I have discussed with the patient the rationale for blood component transfusion; its benefits in treating or preventing fatigue, organ damage, or death; and its risk which includes mild transfusion reactions, rare risk of blood borne infection, or more serious but rare reactions. I have discussed the alternatives to transfusion, including the risk and consequences of not receiving transfusion. The patient had an opportunity to ask questions and had agreed to proceed with transfusion of blood components.    Electronically signed by Rafa Galindo DO on 6/8/25 at 7:17 AM EDT

## 2025-06-09 ENCOUNTER — APPOINTMENT (OUTPATIENT)
Age: 89
End: 2025-06-09
Attending: INTERNAL MEDICINE
Payer: MEDICARE

## 2025-06-09 ENCOUNTER — APPOINTMENT (OUTPATIENT)
Dept: GENERAL RADIOLOGY | Age: 89
End: 2025-06-09
Attending: STUDENT IN AN ORGANIZED HEALTH CARE EDUCATION/TRAINING PROGRAM
Payer: MEDICARE

## 2025-06-09 ENCOUNTER — ANESTHESIA (OUTPATIENT)
Dept: ENDOSCOPY | Age: 89
End: 2025-06-09
Payer: MEDICARE

## 2025-06-09 ENCOUNTER — ANESTHESIA EVENT (OUTPATIENT)
Dept: ENDOSCOPY | Age: 89
End: 2025-06-09
Payer: MEDICARE

## 2025-06-09 PROBLEM — G47.33 OSA (OBSTRUCTIVE SLEEP APNEA): Status: ACTIVE | Noted: 2025-06-09

## 2025-06-09 LAB
ALBUMIN SERPL-MCNC: 3.2 G/DL (ref 3.4–5)
ANION GAP SERPL CALCULATED.3IONS-SCNC: 12 MMOL/L (ref 3–16)
BASOPHILS # BLD: 0.1 K/UL (ref 0–0.2)
BASOPHILS NFR BLD: 0.8 %
BLOOD BANK DISPENSE STATUS: NORMAL
BLOOD BANK PRODUCT CODE: NORMAL
BPU ID: NORMAL
BUN SERPL-MCNC: 47 MG/DL (ref 7–20)
CALCIUM SERPL-MCNC: 8.2 MG/DL (ref 8.3–10.6)
CHLORIDE SERPL-SCNC: 105 MMOL/L (ref 99–110)
CO2 SERPL-SCNC: 23 MMOL/L (ref 21–32)
CREAT SERPL-MCNC: 3 MG/DL (ref 0.8–1.3)
DEPRECATED RDW RBC AUTO: 16 % (ref 12.4–15.4)
DESCRIPTION BLOOD BANK: NORMAL
EKG ATRIAL RATE: 62 BPM
EKG DIAGNOSIS: NORMAL
EKG P AXIS: 24 DEGREES
EKG P-R INTERVAL: 220 MS
EKG Q-T INTERVAL: 526 MS
EKG QRS DURATION: 152 MS
EKG QTC CALCULATION (BAZETT): 533 MS
EKG R AXIS: 59 DEGREES
EKG T AXIS: 11 DEGREES
EKG VENTRICULAR RATE: 62 BPM
EOSINOPHIL # BLD: 0.1 K/UL (ref 0–0.6)
EOSINOPHIL NFR BLD: 1.5 %
GFR SERPLBLD CREATININE-BSD FMLA CKD-EPI: 19 ML/MIN/{1.73_M2}
GLUCOSE SERPL-MCNC: 127 MG/DL (ref 70–99)
HCT VFR BLD AUTO: 19.3 % (ref 40.5–52.5)
HCT VFR BLD AUTO: 21 % (ref 40.5–52.5)
HCT VFR BLD AUTO: 23.7 % (ref 40.5–52.5)
HGB BLD-MCNC: 6.4 G/DL (ref 13.5–17.5)
HGB BLD-MCNC: 7.1 G/DL (ref 13.5–17.5)
HGB BLD-MCNC: 8 G/DL (ref 13.5–17.5)
LYMPHOCYTES # BLD: 1.2 K/UL (ref 1–5.1)
LYMPHOCYTES NFR BLD: 18 %
MCH RBC QN AUTO: 30.3 PG (ref 26–34)
MCHC RBC AUTO-ENTMCNC: 33.9 G/DL (ref 31–36)
MCV RBC AUTO: 89.4 FL (ref 80–100)
MONOCYTES # BLD: 0.7 K/UL (ref 0–1.3)
MONOCYTES NFR BLD: 10.2 %
NEUTROPHILS # BLD: 4.8 K/UL (ref 1.7–7.7)
NEUTROPHILS NFR BLD: 69.5 %
NT-PROBNP SERPL-MCNC: ABNORMAL PG/ML (ref 0–449)
PHOSPHATE SERPL-MCNC: 4.2 MG/DL (ref 2.5–4.9)
PLATELET # BLD AUTO: 131 K/UL (ref 135–450)
PMV BLD AUTO: 9 FL (ref 5–10.5)
POTASSIUM SERPL-SCNC: 4.2 MMOL/L (ref 3.5–5.1)
RBC # BLD AUTO: 2.65 M/UL (ref 4.2–5.9)
SODIUM SERPL-SCNC: 140 MMOL/L (ref 136–145)
WBC # BLD AUTO: 6.8 K/UL (ref 4–11)

## 2025-06-09 PROCEDURE — 6360000002 HC RX W HCPCS: Performed by: STUDENT IN AN ORGANIZED HEALTH CARE EDUCATION/TRAINING PROGRAM

## 2025-06-09 PROCEDURE — 1200000000 HC SEMI PRIVATE

## 2025-06-09 PROCEDURE — 0DBH8ZX EXCISION OF CECUM, VIA NATURAL OR ARTIFICIAL OPENING ENDOSCOPIC, DIAGNOSTIC: ICD-10-PCS | Performed by: INTERNAL MEDICINE

## 2025-06-09 PROCEDURE — 6360000004 HC RX CONTRAST MEDICATION: Performed by: INTERNAL MEDICINE

## 2025-06-09 PROCEDURE — 83880 ASSAY OF NATRIURETIC PEPTIDE: CPT

## 2025-06-09 PROCEDURE — C8929 TTE W OR WO FOL WCON,DOPPLER: HCPCS

## 2025-06-09 PROCEDURE — 0DBC8ZX EXCISION OF ILEOCECAL VALVE, VIA NATURAL OR ARTIFICIAL OPENING ENDOSCOPIC, DIAGNOSTIC: ICD-10-PCS | Performed by: INTERNAL MEDICINE

## 2025-06-09 PROCEDURE — 2580000003 HC RX 258: Performed by: STUDENT IN AN ORGANIZED HEALTH CARE EDUCATION/TRAINING PROGRAM

## 2025-06-09 PROCEDURE — 99223 1ST HOSP IP/OBS HIGH 75: CPT | Performed by: SURGERY

## 2025-06-09 PROCEDURE — 88305 TISSUE EXAM BY PATHOLOGIST: CPT

## 2025-06-09 PROCEDURE — 97530 THERAPEUTIC ACTIVITIES: CPT

## 2025-06-09 PROCEDURE — 97165 OT EVAL LOW COMPLEX 30 MIN: CPT

## 2025-06-09 PROCEDURE — 3609010600 HC COLONOSCOPY POLYPECTOMY SNARE/COLD BIOPSY: Performed by: INTERNAL MEDICINE

## 2025-06-09 PROCEDURE — 2500000003 HC RX 250 WO HCPCS: Performed by: NURSE ANESTHETIST, CERTIFIED REGISTERED

## 2025-06-09 PROCEDURE — 3609009900 HC COLONOSCOPY W/CONTROL BLEEDING ANY METHOD: Performed by: INTERNAL MEDICINE

## 2025-06-09 PROCEDURE — 6360000002 HC RX W HCPCS: Performed by: NURSE ANESTHETIST, CERTIFIED REGISTERED

## 2025-06-09 PROCEDURE — 85025 COMPLETE CBC W/AUTO DIFF WBC: CPT

## 2025-06-09 PROCEDURE — 7100000000 HC PACU RECOVERY - FIRST 15 MIN: Performed by: INTERNAL MEDICINE

## 2025-06-09 PROCEDURE — 93010 ELECTROCARDIOGRAM REPORT: CPT | Performed by: INTERNAL MEDICINE

## 2025-06-09 PROCEDURE — 6370000000 HC RX 637 (ALT 250 FOR IP): Performed by: STUDENT IN AN ORGANIZED HEALTH CARE EDUCATION/TRAINING PROGRAM

## 2025-06-09 PROCEDURE — 36415 COLL VENOUS BLD VENIPUNCTURE: CPT

## 2025-06-09 PROCEDURE — 97161 PT EVAL LOW COMPLEX 20 MIN: CPT

## 2025-06-09 PROCEDURE — 36430 TRANSFUSION BLD/BLD COMPNT: CPT

## 2025-06-09 PROCEDURE — 3700000000 HC ANESTHESIA ATTENDED CARE: Performed by: INTERNAL MEDICINE

## 2025-06-09 PROCEDURE — 5A09357 ASSISTANCE WITH RESPIRATORY VENTILATION, LESS THAN 24 CONSECUTIVE HOURS, CONTINUOUS POSITIVE AIRWAY PRESSURE: ICD-10-PCS | Performed by: INTERNAL MEDICINE

## 2025-06-09 PROCEDURE — 3700000001 HC ADD 15 MINUTES (ANESTHESIA): Performed by: INTERNAL MEDICINE

## 2025-06-09 PROCEDURE — 2500000003 HC RX 250 WO HCPCS: Performed by: STUDENT IN AN ORGANIZED HEALTH CARE EDUCATION/TRAINING PROGRAM

## 2025-06-09 PROCEDURE — 6360000002 HC RX W HCPCS: Performed by: INTERNAL MEDICINE

## 2025-06-09 PROCEDURE — 85014 HEMATOCRIT: CPT

## 2025-06-09 PROCEDURE — 7100000001 HC PACU RECOVERY - ADDTL 15 MIN: Performed by: INTERNAL MEDICINE

## 2025-06-09 PROCEDURE — 80069 RENAL FUNCTION PANEL: CPT

## 2025-06-09 PROCEDURE — 71045 X-RAY EXAM CHEST 1 VIEW: CPT

## 2025-06-09 PROCEDURE — 2709999900 HC NON-CHARGEABLE SUPPLY: Performed by: INTERNAL MEDICINE

## 2025-06-09 PROCEDURE — 3609019800 HC COLONOSCOPY WITH SUBMUCOSAL INJECTION: Performed by: INTERNAL MEDICINE

## 2025-06-09 PROCEDURE — 85018 HEMOGLOBIN: CPT

## 2025-06-09 PROCEDURE — 2720000010 HC SURG SUPPLY STERILE: Performed by: INTERNAL MEDICINE

## 2025-06-09 DEVICE — CLIP
Type: IMPLANTABLE DEVICE | Site: CECUM | Status: FUNCTIONAL
Brand: RESOLUTION 360™ ULTRA CLIP

## 2025-06-09 RX ORDER — EPINEPHRINE 1 MG/ML(1)
AMPUL (ML) INJECTION PRN
Status: DISCONTINUED | OUTPATIENT
Start: 2025-06-09 | End: 2025-06-09 | Stop reason: ALTCHOICE

## 2025-06-09 RX ORDER — PROPOFOL 10 MG/ML
INJECTION, EMULSION INTRAVENOUS
Status: DISCONTINUED | OUTPATIENT
Start: 2025-06-09 | End: 2025-06-09 | Stop reason: SDUPTHER

## 2025-06-09 RX ORDER — PHENYLEPHRINE HCL IN 0.9% NACL 1 MG/10 ML
SYRINGE (ML) INTRAVENOUS
Status: DISCONTINUED | OUTPATIENT
Start: 2025-06-09 | End: 2025-06-09 | Stop reason: SDUPTHER

## 2025-06-09 RX ORDER — SODIUM CHLORIDE 9 MG/ML
INJECTION, SOLUTION INTRAVENOUS PRN
Status: COMPLETED | OUTPATIENT
Start: 2025-06-09 | End: 2025-06-09

## 2025-06-09 RX ORDER — CALCIUM CHLORIDE 100 MG/ML
INJECTION INTRAVENOUS; INTRAVENTRICULAR
Status: DISCONTINUED | OUTPATIENT
Start: 2025-06-09 | End: 2025-06-09 | Stop reason: SDUPTHER

## 2025-06-09 RX ORDER — SODIUM CHLORIDE 9 MG/ML
INJECTION, SOLUTION INTRAVENOUS PRN
Status: DISCONTINUED | OUTPATIENT
Start: 2025-06-09 | End: 2025-06-18

## 2025-06-09 RX ORDER — EPHEDRINE SULFATE 50 MG/ML
INJECTION INTRAVENOUS
Status: DISCONTINUED | OUTPATIENT
Start: 2025-06-09 | End: 2025-06-09 | Stop reason: SDUPTHER

## 2025-06-09 RX ORDER — LIDOCAINE HYDROCHLORIDE 20 MG/ML
INJECTION, SOLUTION INFILTRATION; PERINEURAL
Status: DISCONTINUED | OUTPATIENT
Start: 2025-06-09 | End: 2025-06-09 | Stop reason: SDUPTHER

## 2025-06-09 RX ADMIN — TAMSULOSIN HYDROCHLORIDE 0.4 MG: 0.4 CAPSULE ORAL at 11:33

## 2025-06-09 RX ADMIN — SODIUM CHLORIDE: 9 INJECTION, SOLUTION INTRAVENOUS at 10:17

## 2025-06-09 RX ADMIN — CALCIUM CHLORIDE 0.2 G: 100 INJECTION INTRAVENOUS; INTRAVENTRICULAR at 10:09

## 2025-06-09 RX ADMIN — PROPOFOL 60 MG: 10 INJECTION, EMULSION INTRAVENOUS at 09:37

## 2025-06-09 RX ADMIN — CALCIUM CHLORIDE 0.2 G: 100 INJECTION INTRAVENOUS; INTRAVENTRICULAR at 10:04

## 2025-06-09 RX ADMIN — CARVEDILOL 25 MG: 25 TABLET, FILM COATED ORAL at 18:08

## 2025-06-09 RX ADMIN — CALCIUM CHLORIDE 0.2 G: 100 INJECTION INTRAVENOUS; INTRAVENTRICULAR at 10:14

## 2025-06-09 RX ADMIN — LIDOCAINE HYDROCHLORIDE 60 MG: 20 INJECTION, SOLUTION INFILTRATION; PERINEURAL at 09:37

## 2025-06-09 RX ADMIN — Medication 300 MCG: at 09:59

## 2025-06-09 RX ADMIN — PROPOFOL 120 MCG/KG/MIN: 10 INJECTION, EMULSION INTRAVENOUS at 09:37

## 2025-06-09 RX ADMIN — PANTOPRAZOLE SODIUM 40 MG: 40 INJECTION, POWDER, FOR SOLUTION INTRAVENOUS at 11:32

## 2025-06-09 RX ADMIN — EPHEDRINE SULFATE 10 MG: 50 INJECTION, SOLUTION INTRAVENOUS at 10:14

## 2025-06-09 RX ADMIN — PANTOPRAZOLE SODIUM 40 MG: 40 INJECTION, POWDER, FOR SOLUTION INTRAVENOUS at 20:56

## 2025-06-09 RX ADMIN — CALCIUM CHLORIDE 0.2 G: 100 INJECTION INTRAVENOUS; INTRAVENTRICULAR at 10:20

## 2025-06-09 RX ADMIN — Medication 300 MCG: at 10:02

## 2025-06-09 RX ADMIN — ATORVASTATIN CALCIUM 20 MG: 20 TABLET, FILM COATED ORAL at 11:33

## 2025-06-09 RX ADMIN — CALCIUM CHLORIDE 0.2 G: 100 INJECTION INTRAVENOUS; INTRAVENTRICULAR at 10:29

## 2025-06-09 RX ADMIN — Medication 200 MCG: at 09:51

## 2025-06-09 RX ADMIN — SODIUM CHLORIDE, PRESERVATIVE FREE 10 ML: 5 INJECTION INTRAVENOUS at 11:33

## 2025-06-09 RX ADMIN — FUROSEMIDE 20 MG: 20 TABLET ORAL at 18:08

## 2025-06-09 RX ADMIN — Medication 100 MCG: at 09:43

## 2025-06-09 RX ADMIN — Medication 200 MCG: at 09:46

## 2025-06-09 RX ADMIN — EPHEDRINE SULFATE 10 MG: 50 INJECTION, SOLUTION INTRAVENOUS at 10:03

## 2025-06-09 RX ADMIN — FUROSEMIDE 20 MG: 20 TABLET ORAL at 11:32

## 2025-06-09 RX ADMIN — SULFUR HEXAFLUORIDE 2 ML: 60.7; .19; .19 INJECTION, POWDER, LYOPHILIZED, FOR SUSPENSION INTRAVENOUS; INTRAVESICAL at 16:56

## 2025-06-09 RX ADMIN — CARVEDILOL 25 MG: 25 TABLET, FILM COATED ORAL at 11:32

## 2025-06-09 RX ADMIN — Medication 200 MCG: at 09:54

## 2025-06-09 RX ADMIN — SODIUM CHLORIDE: 9 INJECTION, SOLUTION INTRAVENOUS at 09:10

## 2025-06-09 RX ADMIN — SODIUM CHLORIDE, SODIUM LACTATE, POTASSIUM CHLORIDE, AND CALCIUM CHLORIDE: .6; .31; .03; .02 INJECTION, SOLUTION INTRAVENOUS at 18:04

## 2025-06-09 RX ADMIN — SODIUM CHLORIDE, PRESERVATIVE FREE 10 ML: 5 INJECTION INTRAVENOUS at 20:57

## 2025-06-09 ASSESSMENT — PAIN SCALES - GENERAL
PAINLEVEL_OUTOF10: 0

## 2025-06-09 ASSESSMENT — PAIN - FUNCTIONAL ASSESSMENT: PAIN_FUNCTIONAL_ASSESSMENT: NONE - DENIES PAIN

## 2025-06-09 NOTE — PROGRESS NOTES
Occupational Therapy    [facility] - Inpatient Rehabilitation Department   Phone: [phone]    Occupational Therapy    [x] Initial Evaluation            [] Daily Treatment Note         [] Discharge Summary      Patient: Chester Castano Jr.   : 1935   MRN: [id number]   Date of Service:  2025    Admitting Diagnosis:  Lower GI bleed  Current Admission Summary: Patient admitted for bright red blood in the toilet. Painless bleeding, no pain in rectum. Required 2 units PRBC on admission. GI consulted.   Past Medical History:  has a past medical history of Anxiety, Arthritis, CAD (coronary artery disease), Cancer (HCC), GERD (gastroesophageal reflux disease), Hyperlipidemia, and Hypertension.  Past Surgical History:  has a past surgical history that includes Appendectomy; Tonsillectomy; Knee arthroscopy; TURP; Coronary angioplasty with stent; Carpal tunnel release; TURP (2010); Cardiac surgery (); Prostate surgery; Mohs surgery; pacemaker placement (10/04/2018); Colonoscopy (N/A, 2025); and Colonoscopy (2025).    Discharge Recommendations: Chester Castano Jr. scored a 19/24 on the AM-PAC ADL Inpatient form. Current research shows that an AM-PAC score of 18 or greater is typically associated with a discharge to the patient's home setting. Based on the patient's AM-PAC score, and their current ADL deficits, it is recommended that the patient have 2-3 sessions per week of Occupational Therapy at d/c to increase the patient's independence.  At this time, this patient demonstrates the endurance and safety to discharge home with Home Health OT and a follow up treatment frequency of 2-3x/wk.   Please see assessment section for further patient specific details.    HOME HEALTH CARE: LEVEL 1 STANDARD    - Initial home health evaluation to occur within 24-48 hours, in patient home   - Therapy to evaluate with goal of regaining prior level of functioning   - Therapy to evaluate if  patient has Home Health Aide needs for personal care     If patient discharges prior to next session this note will serve as a discharge summary.  Please see below for the latest assessment towards goals.      DME Required For Discharge: no DME required at discharge    Precautions/Restrictions: high fall risk  Weight Bearing Restrictions: no restrictions  [] Right Upper Extremity  [] Left Upper Extremity [] Right Lower Extremity  [] Left Lower Extremity     Required Braces/Orthotics: no braces required   [] Right  [] Left  Positional Restrictions:no positional restrictions    Pre-Admission Information   Lives With: spouse                  Type of Home: house  Home Layout: one level  Home Access:  2 step to enter without rails   Bathroom Layout: tub/shower unit, walk in shower  Bathroom Equipment: shower chair, portable rails by toilet  Toilet Height: standard height  Home Equipment: single point cane  Transfer Assistance: Independent without use of device  Ambulation Assistance:Independent without use of device  ADL Assistance: independent with all ADL's  IADL Assistance: independent with homemaking tasks  Active :        [x] Yes                 [] No  Hand Dominance: [] Left                 [x] Right  Current Employment: retired.  Occupation: farming,   Hobbies: watching sports  Recent Falls: 1, pt reports he was recently diagnosed with eye condition and he tripped on the sidewalk, did not hit his head.     Available Assistance at Discharge: 24 hr supervision (non-physical) available    Examination   Vision:   Vision Gross Assessment: Impaired and Vision Corrective Device: wears glasses for reading  Hearing:   left hearing aid, right hearing aid  Observation:   General Observation:  PIV, telemetry, RLE Ace wrapped  Posture:   WNL  Sensation:   denies numbness and tingling  Proprioception:    WFL  Tone:   Normotonic  Coordination Testing:   WFL    ROM:   (B) UE AROM WFL  Strength:   (B) UE

## 2025-06-09 NOTE — ANESTHESIA PRE PROCEDURE
Moderately increased wall thickness. Normal wall motion. Grade II diastolic dysfunction with increased LAP. Average E/e' ratio is 17.07.  ·  Right Ventricle: Not well visualized. Right ventricle size is normal. Normal systolic function. TAPSE is normal. TAPSE is 1.7 cm. RV Peak S' is 6 cm/s.  ·  Aortic Valve: Mildly calcified cusps. No regurgitation. Mild stenosis of the aortic valve. AV mean gradient is 11 mmHg. AV peak gradient is 20 mmHg. AV area by continuity VTI is 1.7 cm2.  ·  Mitral Valve: Mild annular calcification at the posterior leaflet. Mild to moderate regurgitation.  ·  Tricuspid Valve: Mild to moderate regurgitation. The estimated RVSP is 34 mmHg.  ·  Pulmonic Valve: Mild regurgitation.  ·  Right Atrium: Right atrium size is normal. Pacerwire noted.  ·  Image quality is technically difficult.        Anesthesia Plan      MAC     ASA 3       Induction: intravenous.    MIPS: Postoperative opioids intended and Prophylactic antiemetics administered.  Anesthetic plan and risks discussed with patient.    Use of blood products discussed with patient whom consented to blood products.    Plan discussed with CRNA.    Attending anesthesiologist reviewed and agrees with Preprocedure content            Frantz Mccormack DO   6/9/2025

## 2025-06-09 NOTE — ANESTHESIA POSTPROCEDURE EVALUATION
Department of Anesthesiology  Postprocedure Note    Patient: Chester Castano Jr.  MRN: [id number]  YOB: 1935  Date of evaluation: 6/9/2025    Procedure Summary       Date: 06/09/25 Room / Location: [place] [address] / [facility]    Anesthesia Start: 0933 Anesthesia Stop: 1039    Procedures:       COLONOSCOPY POLYPECTOMY SNARE/BIOPSY      COLONOSCOPY CONTROL HEMORRHAGE Diagnosis:       Bright red blood per rectum      (Bright red blood per rectum [K62.5])    Surgeons: Tremayne Quintero MD Responsible Provider: Frantz Mccormack DO    Anesthesia Type: MAC ASA Status: 3            Anesthesia Type: No value filed.    Randy Phase I: Randy Score: 8    Rnady Phase II:      Anesthesia Post Evaluation    Patient location during evaluation: PACU  Patient participation: complete - patient participated  Level of consciousness: awake  Airway patency: patent  Nausea & Vomiting: no nausea  Cardiovascular status: hemodynamically stable  Respiratory status: acceptable  Hydration status: euvolemic  Multimodal analgesia pain management approach  Pain management: adequate    No notable events documented.

## 2025-06-09 NOTE — PROGRESS NOTES
Teaching/ education completed for home care including pain management, wound care and infection control. Patient verbalized understanding.

## 2025-06-09 NOTE — CONSULTS
[facility]      GENERAL CARDIOLOGY CONSULTATION  [phone]        Inpatient consult to Cardiology  Consult performed by: Abigail Perdomo RN  Consult ordered by: Stacey Agustin APRN - CNP  Reason for consult: Eliquis on hold for GI bleeding        ASSESSMENT/PLAN:  Afib/ Eliquis, on hold d/t GI bleed, CHADS VASC 6  History of CAD, s/p CABG 2007  Bilateral leg edema on exam  EZRA on CKD IV  History of HTN  SAUD on CPAP  History of Bradycardia s/p PPM    Cardiology consult placed presumably for anticoagulation recommendations.  Given active GI bleeding, the risks of continuing anticoagulation outweighs the benefit.  Once hemostasis is achieved, consider resuming Eliquis (previously on low dose, 2.5 mg BID).  Given elevated CHADSVASC 6, patient is at elevated risk for acute CVA although with infrequent AFIB episodes.  Consider non urgent EP evaluation for Watchman.     Bilateral pitting leg edema on exam with decreased air entry on lung auscultation.  Check BNP, CXR.  Update Echo.   Consider gentle dosing of IV lasix.  Underlying EZRA on CKD may warrant Nephrology evaluation.      Known CAD with remote history of CABG.  Continue statin.  No on aspirin.      History of hypertension, was hypotensive on arrival presumably from ABLA.  Now normotensive at the bedside.        SAUD on CPAP, compliant.  Using mask at bedside.      History of bradycardia s/p PPM.  Device check 5/1/25 reviewed; normal function, battery 7.5 years, EGM with AFIB (2 episodes), NSVT (2 episodes).  Continue BB.      Thank you for allowing to us to participate in the care or Chester Castano Jr.. Further evaluation will be based upon the patient's clinical course and testing results.    All questions and concerns were addressed to the patient/family.   Scribe's Attestation: This note was scribed in the presence of Dr.Victoria Salas DO by Abigail Perdomo RN     History of Present Illness:  Chester Castano Jr. is a 89 y.o.  rub. No gallop.   Pulmonary:      Effort: No respiratory distress.      Breath sounds: Decreased breath sounds present. No wheezing, rhonchi or rales.   Abdominal:      General: Abdomen is protuberant. Bowel sounds are normal.   Musculoskeletal:         General: No swelling.      Right lower leg: Edema present.      Left lower leg: Edema present.   Skin:     General: Skin is warm and dry.   Neurological:      General: No focal deficit present.      Mental Status: He is alert and oriented to person, place, and time.   Psychiatric:         Behavior: Behavior is cooperative.         Cognition and Memory: Cognition normal.           Labs  No results found for: \"TROPHS\"  No results found for: \"PROBNP\"  Lab Results   Component Value Date     06/09/2025    K 4.2 06/09/2025     06/09/2025    CO2 23 06/09/2025    BUN 47 (H) 06/09/2025    CREATININE 3.0 (H) 06/09/2025    GLUCOSE 127 (H) 06/09/2025    CALCIUM 8.2 (L) 06/09/2025    BILITOT 0.3 06/08/2025    ALKPHOS 64 06/08/2025    AST 11 (L) 06/08/2025    ALT 8 (L) 06/08/2025    LABGLOM 19 (A) 06/09/2025    GFRAA 43 (L) 09/24/2021    AGRATIO 1.7 06/08/2025       Lab Results   Component Value Date    WBC 6.8 06/09/2025    HGB 8.0 (L) 06/09/2025    HCT 23.7 (L) 06/09/2025    MCV 89.4 06/09/2025     (L) 06/09/2025     Lab Results   Component Value Date    IRON 32 (L) 06/08/2025    TIBC 175 (L) 06/08/2025    FERRITIN 240 03/31/2025     Lab Results   Component Value Date    CHOL 139 01/24/2018    TRIG 378 (H) 01/24/2018    HDL 29 (L) 01/24/2018       Hemoglobin A1C   Date Value Ref Range Status   05/05/2023 6.2 (H) 4.0 - 6.0 % Final      Lab Results   Component Value Date    TSH 1.64 04/28/2014       SUMMARY OF CURRENT AND RECENT RELEVANT CARDIAC TESTING:    TELEMETRY since admission (personally reviewed by me today)  Paced rhythm, no complex or sustained arrhythmia   CARDIAC IMAGING/TESTING:  EKG personally interpreted:   Results for orders placed or performed

## 2025-06-09 NOTE — PROGRESS NOTES
Hospitalist Progress Note    Name:  Chester Castano Jr.    /Age/Sex: 1935  (89 y.o. male)  MRN & CSN:  [id number] & [id number]    PCP: Micah Sauceda MD    Date of Admission: 2025    Patient Status:  Inpatient     Chief Complaint: No chief complaint on file.      Hospital Course:   Patient admitted for bright red blood in the toilet.  Painless bleeding, no pain in rectum.  Required 2 units PRBC on admission.  GI consulted.    Underwent colonoscopy on . Required 1 U PRBC after procedure.    Subjective:  Today is:  Hospital Day: 2.  Patient seen and examined in ICU-391/391.     Lying in bed.  Denies pain.    Family at bedside and updated.      Medications:  Reviewed    Infusion Medications    sodium chloride      sodium chloride      lactated ringers 75 mL/hr at 25 1109     Scheduled Medications    sodium chloride flush  5-40 mL IntraVENous 2 times per day    atorvastatin  20 mg Oral Daily    carvedilol  25 mg Oral BID WC    furosemide  20 mg Oral BID    [Held by provider] hydrALAZINE  100 mg Oral BID    [Held by provider] isosorbide mononitrate  30 mg Oral Daily    tamsulosin  0.4 mg Oral Daily    pantoprazole  40 mg IntraVENous BID     PRN Meds: sodium chloride flush, sodium chloride, ondansetron **OR** ondansetron, polyethylene glycol, acetaminophen **OR** acetaminophen, sodium chloride, ALPRAZolam      Intake/Output Summary (Last 24 hours) at 2025 1416  Last data filed at 2025 1036  Gross per 24 hour   Intake 2600 ml   Output --   Net 2600 ml       Physical Exam Performed:    BP (!) 165/73   Pulse 75   Temp 97.1 °F (36.2 °C) (Temporal)   Resp 16   Ht 1.778 m (5' 10\")   Wt 105 kg (231 lb 7.7 oz)   SpO2 (!) 88%   BMI 33.21 kg/m²     General appearance: No apparent distress, appears stated age and cooperative.   Respiratory:  Normal respiratory effort. Clear to auscultation, bilaterally without Rales/Wheezes/Rhonchi.  Cardiovascular: Regular rate and rhythm with

## 2025-06-09 NOTE — CONSULTS
[facility]           Chester Levirobby Umana.     CC-lower GI bleeding    HPI: 89-year-old male who presented to [facility] 2 days ago with complaints of bright red bleeding per rectum.  He is on low-dose Eliquis for intermittent atrial fibrillation.  The patient did have a period of hypotension where he was briefly on pressor support.  He has received 2 units of packed red blood cells during this admission.  He is currently hemodynamically stable.  He is still sedated from his colonoscopy.  History is obtained per the patient's wife and daughter.  He has had no abdominal pain.  The patient had no complaints of abdominal pain, nausea or vomiting.  Spoke with Dr. Quintero.  The patient has a benign-appearing polyp at the ileocecal valve.  The polyp was too large to be removed endoscopically.  Biopsies were taken.  The bleeding was suspected to be coming from diverticular disease in either the descending or sigmoid colon.    Past Medical History:   Diagnosis Date    Anxiety     Arthritis     CAD (coronary artery disease)     MI X 2 1992, 1997    Cancer (HCC)     BASAL CELL    GERD (gastroesophageal reflux disease)     Hyperlipidemia     Hypertension        Past Surgical History:   Procedure Laterality Date    APPENDECTOMY      CARDIAC SURGERY  2007    3 V    CARPAL TUNNEL RELEASE      BILAT    COLONOSCOPY N/A 6/9/2025    COLONOSCOPY POLYPECTOMY SNARE/BIOPSY performed by Tremayne Quintero MD at [facility] ENDOSCOPY    COLONOSCOPY  6/9/2025    COLONOSCOPY CONTROL HEMORRHAGE performed by Tremayne Quintero MD at [facility] ENDOSCOPY    CORONARY ANGIOPLASTY WITH STENT PLACEMENT      1997    KNEE ARTHROSCOPY      RT    MOHS SURGERY      on his face    PACEMAKER PLACEMENT  10/04/2018    dual chamber    PROSTATE SURGERY      TONSILLECTOMY      TURP      TURP  12-    CYSTO INTERNAL URETHROTOMY       Social History     Socioeconomic History    Marital status:       Lower GI bleed  Active Problems:    Paroxysmal atrial fibrillation (HCC)    CAD (coronary artery disease)    HTN (hypertension)    Hyperlipidemia    Chronic diastolic congestive heart failure (HCC)    Type 2 diabetes mellitus with complication, without long-term current use of insulin (HCC)    CKD (chronic kidney disease), stage IV (HCC)    Hemorrhagic shock (HCC)  Resolved Problems:    * No resolved hospital problems. *      Blood pressure (!) 165/73, pulse 75, temperature 97.1 °F (36.2 °C), temperature source Temporal, resp. rate 16, height 1.778 m (5' 10\"), weight 105 kg (231 lb 7.7 oz), SpO2 (!) 88%.    Review of Systems   Unable to perform ROS: Other   ROS not performed as patient is still sedated      Physical Exam  Constitutional:       General: He is not in acute distress.     Appearance: He is well-developed.      Comments: Sedated from anesthesia   HENT:      Head: Normocephalic and atraumatic.      Right Ear: External ear normal.      Left Ear: External ear normal.   Eyes:      Conjunctiva/sclera: Conjunctivae normal.   Cardiovascular:      Rate and Rhythm: Normal rate and regular rhythm.      Comments: Paced  Pulmonary:      Effort: Pulmonary effort is normal.      Breath sounds: Normal breath sounds.   Abdominal:      General: There is no distension.      Palpations: Abdomen is soft.      Tenderness: There is no abdominal tenderness.   Musculoskeletal:         General: Normal range of motion.      Cervical back: Normal range of motion and neck supple.   Skin:     General: Skin is warm and dry.   Psychiatric:         Behavior: Behavior normal.         Assessment:  89-year-old male transferred from [facility] for lower GI bleeding.  He had a period of hypotension where he briefly required pressors.  During this admission, he has required 2 units of packed red blood cells.  Current vital signs are stable.  Hemoglobin is 8.0 g/dL.  Colonoscopy today revealed a large nonbleeding polyp in the

## 2025-06-09 NOTE — PLAN OF CARE
Problem: Chronic Conditions and Co-morbidities  Goal: Patient's chronic conditions and co-morbidity symptoms are monitored and maintained or improved  Outcome: Progressing  Flowsheets (Taken 6/8/2025 1207 by Karoline Fernando, RN)  Care Plan - Patient's Chronic Conditions and Co-Morbidity Symptoms are Monitored and Maintained or Improved: Monitor and assess patient's chronic conditions and comorbid symptoms for stability, deterioration, or improvement     Problem: ABCDS Injury Assessment  Goal: Absence of physical injury  Outcome: Progressing     Problem: Skin/Tissue Integrity  Goal: Skin integrity remains intact  Description: 1.  Monitor for areas of redness and/or skin breakdown2.  Assess vascular access sites hourly3.  Every 4-6 hours minimum:  Change oxygen saturation probe site4.  Every 4-6 hours:  If on nasal continuous positive airway pressure, respiratory therapy assess nares and determine need for appliance change or resting period  Outcome: Progressing  Flowsheets (Taken 6/8/2025 1207 by Karoline Fernando, RN)  Skin Integrity Remains Intact: Monitor for areas of redness and/or skin breakdown     Problem: Safety - Adult  Goal: Free from fall injury  Outcome: Progressing     Problem: Discharge Planning  Goal: Discharge to home or other facility with appropriate resources  Outcome: Progressing  Flowsheets (Taken 6/8/2025 1207 by Karoline Fernando, RN)  Discharge to home or other facility with appropriate resources: Identify barriers to discharge with patient and caregiver     Problem: Pain  Goal: Verbalizes/displays adequate comfort level or baseline comfort level  Outcome: Progressing

## 2025-06-09 NOTE — H&P
Gastroenterology Note             Pre-operative History and Physical    Patient: Chester Castano Jr.  : 1935  CSN:     History Obtained From:  patient and/or guardian.     HISTORY OF PRESENT ILLNESS:    The patient is a 89 y.o. male  here for /colonoscopy.   This a very pleasant 89-year-old male who is an inpatient at [facility] were doing a colonoscopy today because he is admitted for rectal bleeding he was seen in consultation yesterday with our colleague Dr. Deny Gomez  Patient took his prep last night continues to have bleeding    Past Medical History:    Past Medical History:   Diagnosis Date    Anxiety     Arthritis     CAD (coronary artery disease)     MI X 2 ,     Cancer (HCC)     BASAL CELL    GERD (gastroesophageal reflux disease)     Hyperlipidemia     Hypertension      Past Surgical History:    Past Surgical History:   Procedure Laterality Date    APPENDECTOMY      CARDIAC SURGERY      3 V    CARPAL TUNNEL RELEASE      BILAT    CORONARY ANGIOPLASTY WITH STENT PLACEMENT          KNEE ARTHROSCOPY      RT    MOHS SURGERY      on his face    PACEMAKER PLACEMENT  10/04/2018    dual chamber    PROSTATE SURGERY      TONSILLECTOMY      TURP      TURP  2010    CYSTO INTERNAL URETHROTOMY     Medications Prior to Admission:   No current facility-administered medications on file prior to encounter.     Current Outpatient Medications on File Prior to Encounter   Medication Sig Dispense Refill    furosemide (LASIX) 20 MG tablet Take 1 tablet by mouth daily 90 tablet 1    atorvastatin (LIPITOR) 20 MG tablet Take 1 tablet by mouth daily 90 tablet 4    apixaban (ELIQUIS) 2.5 MG TABS tablet Take 1 tablet by mouth 2 times daily 180 tablet 4    hydrALAZINE (APRESOLINE) 100 MG tablet Take 1 tab by mouth every day at noon and 5 pm 225 tablet 0    isosorbide mononitrate (IMDUR) 30 MG extended release tablet Take 1 tablet by mouth daily 90 tablet 4    Multiple Vitamin

## 2025-06-09 NOTE — PROGRESS NOTES
[facility] - Inpatient Rehabilitation Department   Phone: [phone]    Physical Therapy    [x] Initial Evaluation            [] Daily Treatment Note         [] Discharge Summary      Patient: Chester Castano Jr.   : 1935   MRN: [id number]   Date of Service:  2025  Admitting Diagnosis: Lower GI bleed  Current Admission Summary: Patient admitted for bright red blood in the toilet. Painless bleeding, no pain in rectum. Required 2 units PRBC on admission. GI consulted.   Past Medical History:  has a past medical history of Anxiety, Arthritis, CAD (coronary artery disease), Cancer (HCC), GERD (gastroesophageal reflux disease), Hyperlipidemia, and Hypertension.  Past Surgical History:  has a past surgical history that includes Appendectomy; Tonsillectomy; Knee arthroscopy; TURP; Coronary angioplasty with stent; Carpal tunnel release; TURP (2010); Cardiac surgery (); Prostate surgery; Mohs surgery; pacemaker placement (10/04/2018); Colonoscopy (N/A, 2025); and Colonoscopy (2025).  Discharge Recommendations: Chester Castano Jr. scored a 19/24 on the AM-PAC short mobility form. Current research shows that an AM-PAC score of 18 or greater is typically associated with a discharge to the patient's home setting. Based on the patient's AM-PAC score and their current functional mobility deficits, it is recommended that the patient have 2-3 sessions per week of Physical Therapy at d/c to increase the patient's independence.  At this time, this patient demonstrates the endurance and safety to discharge home with HHPT and a follow up treatment frequency of 2-3x/wk.  Please see assessment section for further patient specific details.    If patient discharges prior to next session this note will serve as a discharge summary.  Please see below for the latest assessment towards goals.       HOME HEALTH CARE: LEVEL 1 STANDARD    - Initial home health evaluation to occur within 24-48  stable      Subjective  General: Pt supine in bed with HOB elevated upon arrival. Pt agreeable to PT/OT evaluation today.   Pain: 0/10  Pain Interventions: not applicable       Functional Mobility  Bed Mobility:  Supine to Sit: supervision  Scooting: supervision  Comments: pt initiated movement without cueing  Transfers:  Sit to stand transfer: contact guard assistance  Stand to sit transfer: contact guard assistance  Comments: CGA given for safety d/t BP changes  Ambulation:  Surface:level surface  Assistive Device: no device  Assistance: contact guard assistance  Distance: ~3 ft  Gait Mechanics: not enough steps to evaluate thoroughly   Comments: Pt ambulated 3 feet to recliner d/t standing BP dropping to 93/53  Stair Mobility:  Stair mobility not completed on this date.  Comments:  Wheelchair Mobility:  No w/c mobility completed on this date.  Comments:  Balance:  Static Sitting Balance: good: independent with functional balance in unsupported position  Dynamic Sitting Balance: good: independent with functional balance in unsupported position  Static Standing Balance: fair: maintains balance at CGA without use of UE support  Dynamic Standing Balance: fair: maintains balance at CGA without use of UE support  Comments:    Other Therapeutic Interventions  Attempted to ambulate today but halted d/t standing BP 93/53    Functional Outcomes  AM-PAC Inpatient Mobility Raw Score : 19              Cognition  WFL  Orientation:    alert and oriented x 4  Command Following:   WFL    Education  Barriers To Learning: none  Patient Education: patient educated on goals, PT role and benefits, plan of care  Learning Assessment:  patient verbalizes and demonstrates understanding    Assessment  Activity Tolerance: fair; pt demonstrates adequate mobility but is limited by OH.  Impairments Requiring Therapeutic Intervention: decreased functional mobility, decreased ROM, decreased strength, decreased endurance, decreased

## 2025-06-09 NOTE — PROGRESS NOTES
Physical/Occupational Therapy  Chester Castano Jr.  PT/OT orders noted and appreciated. Per discussion with RN, Florencio, pt in process of being transported HEATHER to endoscopy for procedure. PT/OT will therefore HOLD at this time and follow-up with pt as schedule allows.    Thank you,  Abdulaziz Blood, PT, DPT, [id number]  Karla De La Cruz, [facility] OTR/L [id number]

## 2025-06-09 NOTE — CARE COORDINATION
06/09/25 0800   IMM Letter   IMM Letter given to Patient/Family/Significant other/Guardian/POA/by: JENNY Perry RN/CM  (Initial)   IMM Letter date given: 06/09/25   IMM Letter time given: 0800

## 2025-06-10 ENCOUNTER — APPOINTMENT (OUTPATIENT)
Dept: NUCLEAR MEDICINE | Age: 89
End: 2025-06-10
Attending: STUDENT IN AN ORGANIZED HEALTH CARE EDUCATION/TRAINING PROGRAM
Payer: MEDICARE

## 2025-06-10 ENCOUNTER — TELEPHONE (OUTPATIENT)
Dept: CARDIOLOGY CLINIC | Age: 89
End: 2025-06-10

## 2025-06-10 PROBLEM — I50.9 ACUTE DECOMPENSATED HEART FAILURE (HCC): Status: ACTIVE | Noted: 2025-06-10

## 2025-06-10 LAB
ALBUMIN SERPL-MCNC: 3 G/DL (ref 3.4–5)
ANION GAP SERPL CALCULATED.3IONS-SCNC: 11 MMOL/L (ref 3–16)
BASOPHILS # BLD: 0.1 K/UL (ref 0–0.2)
BASOPHILS NFR BLD: 1 %
BLOOD BANK DISPENSE STATUS: NORMAL
BLOOD BANK PRODUCT CODE: NORMAL
BPU ID: NORMAL
BUN SERPL-MCNC: 40 MG/DL (ref 7–20)
CALCIUM SERPL-MCNC: 8.2 MG/DL (ref 8.3–10.6)
CHLORIDE SERPL-SCNC: 104 MMOL/L (ref 99–110)
CO2 SERPL-SCNC: 23 MMOL/L (ref 21–32)
CREAT SERPL-MCNC: 3.1 MG/DL (ref 0.8–1.3)
DEPRECATED RDW RBC AUTO: 16.1 % (ref 12.4–15.4)
DESCRIPTION BLOOD BANK: NORMAL
ECHO AO ASC DIAM: 3.9 CM
ECHO AO ASCENDING AORTA INDEX: 1.76 CM/M2
ECHO AO ROOT DIAM: 3 CM
ECHO AO ROOT INDEX: 1.35 CM/M2
ECHO AV AREA PEAK VELOCITY: 2.1 CM2
ECHO AV AREA VTI: 2.2 CM2
ECHO AV AREA/BSA PEAK VELOCITY: 0.9 CM2/M2
ECHO AV AREA/BSA VTI: 1 CM2/M2
ECHO AV MEAN GRADIENT: 9 MMHG
ECHO AV MEAN GRADIENT: 9 MMHG
ECHO AV MEAN VELOCITY: 1.4 M/S
ECHO AV PEAK GRADIENT: 17 MMHG
ECHO AV PEAK VELOCITY: 2.1 M/S
ECHO AV VELOCITY RATIO: 0.57
ECHO AV VTI: 47.1 CM
ECHO BSA: 2.27 M2
ECHO EST RA PRESSURE: 8 MMHG
ECHO LA AREA 2C: 26.7 CM2
ECHO LA AREA 4C: 30.4 CM2
ECHO LA MAJOR AXIS: 7.3 CM
ECHO LA MINOR AXIS: 6.8 CM
ECHO LA VOL BP: 96 ML (ref 18–58)
ECHO LA VOL MOD A2C: 85 ML (ref 18–58)
ECHO LA VOL MOD A4C: 102 ML (ref 18–58)
ECHO LA VOL/BSA BIPLANE: 43 ML/M2 (ref 16–34)
ECHO LA VOLUME INDEX MOD A2C: 38 ML/M2 (ref 16–34)
ECHO LA VOLUME INDEX MOD A4C: 46 ML/M2 (ref 16–34)
ECHO LV E' LATERAL VELOCITY: 8.7 CM/S
ECHO LV E' SEPTAL VELOCITY: 6.2 CM/S
ECHO LV EDV A2C: 184 ML
ECHO LV EDV A4C: 163 ML
ECHO LV EDV INDEX A4C: 73 ML/M2
ECHO LV EDV NDEX A2C: 83 ML/M2
ECHO LV EF PHYSICIAN: 50 %
ECHO LV EJECTION FRACTION A2C: 43 %
ECHO LV EJECTION FRACTION A4C: 62 %
ECHO LV EJECTION FRACTION BIPLANE: 52 % (ref 55–100)
ECHO LV ESV A2C: 105 ML
ECHO LV ESV A4C: 63 ML
ECHO LV ESV INDEX A2C: 47 ML/M2
ECHO LV ESV INDEX A4C: 28 ML/M2
ECHO LV FRACTIONAL SHORTENING: 25 % (ref 28–44)
ECHO LV INTERNAL DIMENSION DIASTOLE INDEX: 2.48 CM/M2
ECHO LV INTERNAL DIMENSION DIASTOLIC: 5.5 CM (ref 4.2–5.9)
ECHO LV INTERNAL DIMENSION SYSTOLIC INDEX: 1.85 CM/M2
ECHO LV INTERNAL DIMENSION SYSTOLIC: 4.1 CM
ECHO LV IVSD: 0.9 CM (ref 0.6–1)
ECHO LV MASS 2D: 172.7 G (ref 88–224)
ECHO LV MASS INDEX 2D: 77.8 G/M2 (ref 49–115)
ECHO LV POSTERIOR WALL DIASTOLIC: 0.8 CM (ref 0.6–1)
ECHO LV RELATIVE WALL THICKNESS RATIO: 0.29
ECHO LVOT AREA: 3.8 CM2
ECHO LVOT AV VTI INDEX: 0.57
ECHO LVOT DIAM: 2.2 CM
ECHO LVOT MEAN GRADIENT: 3 MMHG
ECHO LVOT PEAK GRADIENT: 5 MMHG
ECHO LVOT PEAK VELOCITY: 1.2 M/S
ECHO LVOT STROKE VOLUME INDEX: 46 ML/M2
ECHO LVOT SV: 102.2 ML
ECHO LVOT VTI: 26.9 CM
ECHO MV A VELOCITY: 1.07 M/S
ECHO MV AREA VTI: 2.6 CM2
ECHO MV E VELOCITY: 0.99 M/S
ECHO MV E/A RATIO: 0.93
ECHO MV E/E' LATERAL: 11.38
ECHO MV E/E' RATIO (AVERAGED): 13.67
ECHO MV E/E' SEPTAL: 15.97
ECHO MV LVOT VTI INDEX: 1.46
ECHO MV MAX VELOCITY: 1 M/S
ECHO MV MEAN GRADIENT: 2 MMHG
ECHO MV MEAN VELOCITY: 0.7 M/S
ECHO MV PEAK GRADIENT: 4 MMHG
ECHO MV VTI: 39.2 CM
ECHO PV MAX VELOCITY: 0.9 M/S
ECHO PV PEAK GRADIENT: 3 MMHG
ECHO RA AREA 4C: 19.7 CM2
ECHO RA END SYSTOLIC VOLUME APICAL 4 CHAMBER INDEX BSA: 26 ML/M2
ECHO RA VOLUME: 58 ML
ECHO RIGHT VENTRICULAR SYSTOLIC PRESSURE (RVSP): 28 MMHG
ECHO RV FREE WALL PEAK S': 8.9 CM/S
ECHO RV TAPSE: 1.8 CM (ref 1.7–?)
ECHO TV REGURGITANT MAX VELOCITY: 2.24 M/S
ECHO TV REGURGITANT PEAK GRADIENT: 20 MMHG
EOSINOPHIL # BLD: 0.1 K/UL (ref 0–0.6)
EOSINOPHIL NFR BLD: 1.3 %
GFR SERPLBLD CREATININE-BSD FMLA CKD-EPI: 18 ML/MIN/{1.73_M2}
GLUCOSE SERPL-MCNC: 166 MG/DL (ref 70–99)
HCT VFR BLD AUTO: 20.7 % (ref 40.5–52.5)
HCT VFR BLD AUTO: 22.8 % (ref 40.5–52.5)
HCT VFR BLD AUTO: 24.3 % (ref 40.5–52.5)
HGB BLD-MCNC: 6.9 G/DL (ref 13.5–17.5)
HGB BLD-MCNC: 7.6 G/DL (ref 13.5–17.5)
HGB BLD-MCNC: 8.3 G/DL (ref 13.5–17.5)
LYMPHOCYTES # BLD: 1.1 K/UL (ref 1–5.1)
LYMPHOCYTES NFR BLD: 20.1 %
MCH RBC QN AUTO: 29.5 PG (ref 26–34)
MCHC RBC AUTO-ENTMCNC: 33.5 G/DL (ref 31–36)
MCV RBC AUTO: 88.1 FL (ref 80–100)
MONOCYTES # BLD: 0.6 K/UL (ref 0–1.3)
MONOCYTES NFR BLD: 11.5 %
NEUTROPHILS # BLD: 3.6 K/UL (ref 1.7–7.7)
NEUTROPHILS NFR BLD: 66.1 %
PHOSPHATE SERPL-MCNC: 4.3 MG/DL (ref 2.5–4.9)
PLATELET # BLD AUTO: 109 K/UL (ref 135–450)
PMV BLD AUTO: 9 FL (ref 5–10.5)
POTASSIUM SERPL-SCNC: 4.2 MMOL/L (ref 3.5–5.1)
RBC # BLD AUTO: 2.35 M/UL (ref 4.2–5.9)
SODIUM SERPL-SCNC: 138 MMOL/L (ref 136–145)
WBC # BLD AUTO: 5.5 K/UL (ref 4–11)

## 2025-06-10 PROCEDURE — 36569 INSJ PICC 5 YR+ W/O IMAGING: CPT

## 2025-06-10 PROCEDURE — 2580000003 HC RX 258: Performed by: STUDENT IN AN ORGANIZED HEALTH CARE EDUCATION/TRAINING PROGRAM

## 2025-06-10 PROCEDURE — 6370000000 HC RX 637 (ALT 250 FOR IP): Performed by: STUDENT IN AN ORGANIZED HEALTH CARE EDUCATION/TRAINING PROGRAM

## 2025-06-10 PROCEDURE — 3430000000 HC RX DIAGNOSTIC RADIOPHARMACEUTICAL: Performed by: INTERNAL MEDICINE

## 2025-06-10 PROCEDURE — 6360000002 HC RX W HCPCS: Performed by: STUDENT IN AN ORGANIZED HEALTH CARE EDUCATION/TRAINING PROGRAM

## 2025-06-10 PROCEDURE — 85025 COMPLETE CBC W/AUTO DIFF WBC: CPT

## 2025-06-10 PROCEDURE — A9560 TC99M LABELED RBC: HCPCS | Performed by: INTERNAL MEDICINE

## 2025-06-10 PROCEDURE — APPSS15 APP SPLIT SHARED TIME 0-15 MINUTES: Performed by: NURSE PRACTITIONER

## 2025-06-10 PROCEDURE — 6360000002 HC RX W HCPCS: Performed by: INTERNAL MEDICINE

## 2025-06-10 PROCEDURE — 2500000003 HC RX 250 WO HCPCS: Performed by: STUDENT IN AN ORGANIZED HEALTH CARE EDUCATION/TRAINING PROGRAM

## 2025-06-10 PROCEDURE — 2500000003 HC RX 250 WO HCPCS: Performed by: INTERNAL MEDICINE

## 2025-06-10 PROCEDURE — APPNB30 APP NON BILLABLE TIME 0-30 MINS: Performed by: NURSE PRACTITIONER

## 2025-06-10 PROCEDURE — C1751 CATH, INF, PER/CENT/MIDLINE: HCPCS

## 2025-06-10 PROCEDURE — 80069 RENAL FUNCTION PANEL: CPT

## 2025-06-10 PROCEDURE — 93306 TTE W/DOPPLER COMPLETE: CPT | Performed by: INTERNAL MEDICINE

## 2025-06-10 PROCEDURE — 85014 HEMATOCRIT: CPT

## 2025-06-10 PROCEDURE — 36415 COLL VENOUS BLD VENIPUNCTURE: CPT

## 2025-06-10 PROCEDURE — 36430 TRANSFUSION BLD/BLD COMPNT: CPT

## 2025-06-10 PROCEDURE — 85018 HEMOGLOBIN: CPT

## 2025-06-10 PROCEDURE — 99222 1ST HOSP IP/OBS MODERATE 55: CPT | Performed by: NURSE PRACTITIONER

## 2025-06-10 PROCEDURE — 1200000000 HC SEMI PRIVATE

## 2025-06-10 PROCEDURE — 02HV33Z INSERTION OF INFUSION DEVICE INTO SUPERIOR VENA CAVA, PERCUTANEOUS APPROACH: ICD-10-PCS | Performed by: INTERNAL MEDICINE

## 2025-06-10 PROCEDURE — 78278 ACUTE GI BLOOD LOSS IMAGING: CPT

## 2025-06-10 PROCEDURE — 99231 SBSQ HOSP IP/OBS SF/LOW 25: CPT | Performed by: SURGERY

## 2025-06-10 RX ORDER — SODIUM CHLORIDE 9 MG/ML
INJECTION, SOLUTION INTRAVENOUS PRN
Status: DISCONTINUED | OUTPATIENT
Start: 2025-06-10 | End: 2025-06-18

## 2025-06-10 RX ORDER — SODIUM CHLORIDE 0.9 % (FLUSH) 0.9 %
10 SYRINGE (ML) INJECTION PRN
Status: DISCONTINUED | OUTPATIENT
Start: 2025-06-10 | End: 2025-06-19 | Stop reason: HOSPADM

## 2025-06-10 RX ORDER — SODIUM CHLORIDE 9 MG/ML
INJECTION, SOLUTION INTRAVENOUS PRN
Status: DISCONTINUED | OUTPATIENT
Start: 2025-06-10 | End: 2025-06-19 | Stop reason: HOSPADM

## 2025-06-10 RX ORDER — LIDOCAINE HYDROCHLORIDE 10 MG/ML
50 INJECTION, SOLUTION EPIDURAL; INFILTRATION; INTRACAUDAL; PERINEURAL ONCE
Status: DISCONTINUED | OUTPATIENT
Start: 2025-06-10 | End: 2025-06-19 | Stop reason: HOSPADM

## 2025-06-10 RX ORDER — SODIUM CHLORIDE 0.9 % (FLUSH) 0.9 %
5-40 SYRINGE (ML) INJECTION PRN
Status: DISCONTINUED | OUTPATIENT
Start: 2025-06-10 | End: 2025-06-19 | Stop reason: HOSPADM

## 2025-06-10 RX ORDER — SODIUM CHLORIDE 0.9 % (FLUSH) 0.9 %
5-40 SYRINGE (ML) INJECTION EVERY 12 HOURS SCHEDULED
Status: DISCONTINUED | OUTPATIENT
Start: 2025-06-10 | End: 2025-06-19 | Stop reason: HOSPADM

## 2025-06-10 RX ORDER — FUROSEMIDE 10 MG/ML
40 INJECTION INTRAMUSCULAR; INTRAVENOUS 2 TIMES DAILY
Status: DISCONTINUED | OUTPATIENT
Start: 2025-06-10 | End: 2025-06-12

## 2025-06-10 RX ADMIN — ACETAMINOPHEN 650 MG: 325 TABLET ORAL at 23:41

## 2025-06-10 RX ADMIN — EPOETIN ALFA-EPBX 7000 UNITS: 10000 INJECTION, SOLUTION INTRAVENOUS; SUBCUTANEOUS at 15:31

## 2025-06-10 RX ADMIN — ATORVASTATIN CALCIUM 20 MG: 20 TABLET, FILM COATED ORAL at 08:00

## 2025-06-10 RX ADMIN — TAMSULOSIN HYDROCHLORIDE 0.4 MG: 0.4 CAPSULE ORAL at 08:00

## 2025-06-10 RX ADMIN — Medication 10 ML: at 15:17

## 2025-06-10 RX ADMIN — FUROSEMIDE 20 MG: 20 TABLET ORAL at 08:01

## 2025-06-10 RX ADMIN — SODIUM CHLORIDE, PRESERVATIVE FREE 10 ML: 5 INJECTION INTRAVENOUS at 20:24

## 2025-06-10 RX ADMIN — Medication 27.24 MILLICURIE: at 15:17

## 2025-06-10 RX ADMIN — FUROSEMIDE 40 MG: 10 INJECTION, SOLUTION INTRAMUSCULAR; INTRAVENOUS at 18:11

## 2025-06-10 RX ADMIN — SODIUM CHLORIDE, PRESERVATIVE FREE 10 ML: 5 INJECTION INTRAVENOUS at 08:04

## 2025-06-10 RX ADMIN — PANTOPRAZOLE SODIUM 40 MG: 40 INJECTION, POWDER, FOR SOLUTION INTRAVENOUS at 08:00

## 2025-06-10 RX ADMIN — SODIUM CHLORIDE, SODIUM LACTATE, POTASSIUM CHLORIDE, AND CALCIUM CHLORIDE: .6; .31; .03; .02 INJECTION, SOLUTION INTRAVENOUS at 12:45

## 2025-06-10 RX ADMIN — CARVEDILOL 25 MG: 25 TABLET, FILM COATED ORAL at 18:11

## 2025-06-10 RX ADMIN — CARVEDILOL 25 MG: 25 TABLET, FILM COATED ORAL at 08:00

## 2025-06-10 RX ADMIN — FUROSEMIDE 40 MG: 10 INJECTION, SOLUTION INTRAMUSCULAR; INTRAVENOUS at 15:32

## 2025-06-10 RX ADMIN — PANTOPRAZOLE SODIUM 40 MG: 40 INJECTION, POWDER, FOR SOLUTION INTRAVENOUS at 20:24

## 2025-06-10 ASSESSMENT — PAIN SCALES - GENERAL
PAINLEVEL_OUTOF10: 0
PAINLEVEL_OUTOF10: 3
PAINLEVEL_OUTOF10: 0

## 2025-06-10 ASSESSMENT — PAIN - FUNCTIONAL ASSESSMENT: PAIN_FUNCTIONAL_ASSESSMENT: PREVENTS OR INTERFERES SOME ACTIVE ACTIVITIES AND ADLS

## 2025-06-10 ASSESSMENT — PAIN DESCRIPTION - LOCATION: LOCATION: LEG

## 2025-06-10 ASSESSMENT — PAIN DESCRIPTION - ORIENTATION: ORIENTATION: LEFT

## 2025-06-10 ASSESSMENT — PAIN DESCRIPTION - DESCRIPTORS: DESCRIPTORS: ACHING;TIGHTNESS

## 2025-06-10 NOTE — TELEPHONE ENCOUNTER
RMM wants patient to see him in the office to discuss watchman (he is a follow up). Can offer 7/17 or 7/24 added on at 11:45 OR 8/7 ok to use any new patient slot that day. Thanks!

## 2025-06-10 NOTE — CONSULTS
Renal consult received  With his age and need for access, okay for PICC line on dominant arm    Thank you    Bharathi Bernal MD

## 2025-06-10 NOTE — PROGRESS NOTES
[facility]        Progress Note    Patient Name: Chester Castano Jr.  MRN: [id number]  YOB: 1935  Date of Evaluation: 6/10/2025    Chief Complaint: Lower GI bleeding    Subjective:  No acute events overnight  Denies pain  No nausea or vomiting, tolerating clear liquid diet and hungry for regular food  No further stool or blood since colonoscopy  Resting in bed at this time      Vital Signs:  Patient Vitals for the past 24 hrs:   BP Temp Temp src Pulse Resp SpO2 Height Weight   06/10/25 1300 133/61 -- -- 71 -- 92 % -- --   06/10/25 1200 (!) 130/96 98 °F (36.7 °C) Temporal 75 19 94 % -- --   06/10/25 1100 (!) 142/62 -- -- 74 -- 93 % -- --   06/10/25 1028 (!) 148/68 97.8 °F (36.6 °C) Temporal 73 19 95 % -- --   06/10/25 1013 (!) 124/54 97.1 °F (36.2 °C) Temporal 74 18 96 % -- --   06/10/25 0800 (!) 144/64 97.4 °F (36.3 °C) Temporal 73 19 94 % -- --   06/10/25 0600 -- -- -- -- -- -- -- 104.7 kg (230 lb 13.2 oz)   06/10/25 0400 (!) 137/45 -- -- 72 -- 93 % -- --   06/10/25 0300 (!) 118/45 -- -- 71 -- 90 % -- --   06/10/25 0000 130/75 97.4 °F (36.3 °C) Temporal -- 16 -- -- --   25 2000 122/60 97 °F (36.1 °C) Temporal 60 16 96 % -- --   25 1900 (!) 112/102 97.1 °F (36.2 °C) Temporal 72 -- 95 % -- --   25 1845 (!) 129/57 97 °F (36.1 °C) Temporal 78 18 -- -- --   25 1830 (!) 116/54 96.8 °F (36 °C) Temporal 60 16 -- -- --   25 1822 (!) 122/52 96.8 °F (36 °C) Temporal 69 20 97 % -- --   25 1818 (!) 119/55 (!) 96.7 °F (35.9 °C) Temporal 74 20 98 % -- --   25 1800 (!) 119/55 -- -- 60 18 94 % -- --   25 1622 125/60 -- -- -- -- -- 1.778 m (5' 10\") 104.8 kg (231 lb)   25 1600 12560 97 °F (36.1 °C) Temporal 65 16 98 % -- --      TEMPERATURE HISTORY 24H: Temp (24hrs), Av.2 °F (36.2 °C), Min:96.7 °F (35.9 °C), Max:98 °F (36.7 °C)    BLOOD PRESSURE HISTORY: Systolic (36hrs), Av , Min:101 , Max:165    Diastolic

## 2025-06-10 NOTE — CONSULTS
[facility]  ICU Consultation Note    Reason for Consult: EZRA on CKD 4  Requesting Physician:  Dr. BING Cardenas    CHIEF COMPLAINT: Rectal bleeding    History obtained from records and patient.    HISTORY OF PRESENT ILLNESS:                Chester Castano Jr.  is 89 y.o. y.o. male with significant past medical history of CKD 4, baseline crea around 2.4, varies between 2-3.1, follows with us in the office, hyperkalemia, nephrotic range proteinuria with last urine protein of 5.8 g, hypertension, diabetes mellitus type 2, anemia, right renal stone status post removal, edema, 23 status post CABG who presents with rectal bleeding.  No seen by GI.  Hemoglobin now down to 6.9.  I am asked to see the patient with regards to his underlying CKD.  Creatinine yesterday was 3.  Not oliguric.  Systolic blood pressure ranging from 110s to 140s range.  Needing PICC line.    Past Medical History:     has a past medical history of Anxiety, Arthritis, CAD (coronary artery disease), Cancer (HCC), GERD (gastroesophageal reflux disease), Hyperlipidemia, and Hypertension.   Past Surgical History:     has a past surgical history that includes Appendectomy; Tonsillectomy; Knee arthroscopy; TURP; Coronary angioplasty with stent; Carpal tunnel release; TURP (12-); Cardiac surgery (2007); Prostate surgery; Mohs surgery; pacemaker placement (10/04/2018); Colonoscopy (N/A, 6/9/2025); and Colonoscopy (6/9/2025).   Current Medications:    Current Facility-Administered Medications: 0.9 % sodium chloride infusion, , IntraVENous, PRN  furosemide (LASIX) injection 40 mg, 40 mg, IntraVENous, BID  sodium chloride flush 0.9 % injection 5-40 mL, 5-40 mL, IntraVENous, 2 times per day  sodium chloride flush 0.9 % injection 5-40 mL, 5-40 mL, IntraVENous, PRN  0.9 % sodium chloride infusion, , IntraVENous, PRN  lidocaine PF 1 % injection 50 mg, 50 mg, IntraDERmal, Once  0.9 % sodium chloride infusion, , IntraVENous,

## 2025-06-10 NOTE — CONSULTS
PICC line education:    -Risks  -Benefits  -Alternatives  -Procedure    Discussed the above with patient, verbalized understanding, answered all questions. Provided with information on PICC care to review. PICC tip verified via 3[employer] (Ok to use). Reported off to patient's  Nurse Ksenia    .

## 2025-06-10 NOTE — PROGRESS NOTES
[facility]      GENERAL CARDIOLOGY PROGRESS NOTE  [phone]          ASSESSMENT/PLAN:  Afib/ Eliquis, on hold d/t GI bleed, CHADS VASC 6  History of CAD, s/p CABG 2007  Bilateral leg edema on exam with ADHF  EZRA on CKD IV  SAUD on CPAP  History of Bradycardia s/p PPM  ABLA due to lower GIB    Cardiology consult placed presumably for anticoagulation recommendations.  Given active GI bleeding requiring blood transfusion, the risks of continuing anticoagulation outweighs the benefit.  Once hemostasis is achieved, consider resuming Eliquis (previously on low dose, 2.5 mg BID).  Given elevated CHADSVASC 6, patient is at elevated risk for acute CVA although with infrequent AFIB episodes.  Appreciate Cardiac EP recommendations and evaluation for Watchman procedure.      Bilateral pitting leg edema on exam with elevated BNP 10K with moderate pleural effusion and congestion on CXR.  Findings are consistent with ADHF, presuming acute on chronic LV diastolic HF.  Echo ordered this visit, pending.  Suspect etiology secondary to multiple blood transfusions.  Recommend scheduling IV lasix.  Hemodynamics are stable.  Per Family request, will consult Nephrology given advanced renal dysfunction.     Known CAD with remote history of CABG.  Continue statin.  Not on aspirin.      SAUD on CPAP, compliant.  Using mask at bedside.      History of bradycardia s/p PPM.  Device check 5/1/25 reviewed; normal function, battery 7.5 years, EGM with AFIB (2 episodes), NSVT (2 episodes).  Continue BB.      Thank you for allowing to us to participate in the care or Chester Castano Jr.. Further evaluation will be based upon the patient's clinical course and testing results.    All questions and concerns were addressed to the patient/family.   Dyana Salas DO, MBA [facility]    History of Present Illness:  Chester Castano Jr. is a 89 y.o. presenting with rectal bleeding.      Interval Update:  Patient seen and examined, accompanied by

## 2025-06-10 NOTE — PROGRESS NOTES
Hospitalist Progress Note    Name:  Chester Castano Jr.    /Age/Sex: 1935  (89 y.o. male)  MRN & CSN:  [id number] & [id number]    PCP: Micah Sauceda MD    Date of Admission: 2025    Patient Status:  Inpatient     Chief Complaint: No chief complaint on file.      Hospital Course:   Patient admitted for bright red blood in the toilet.  Painless bleeding, no pain in rectum.  Required 2 units PRBC on admission.  GI consulted.    Underwent colonoscopy on . Required 1 U PRBC after procedure.    Subjective:  Today is:  Hospital Day: 3.  Patient seen and examined in ICU-[id number].     Lying in bed.  Denies pain.    Family at bedside and updated.      Medications:  Reviewed    Infusion Medications    sodium chloride      sodium chloride      sodium chloride      sodium chloride      sodium chloride       Scheduled Medications    furosemide  40 mg IntraVENous BID    sodium chloride flush  5-40 mL IntraVENous 2 times per day    lidocaine 1 % injection  50 mg IntraDERmal Once    sodium chloride flush  5-40 mL IntraVENous 2 times per day    atorvastatin  20 mg Oral Daily    carvedilol  25 mg Oral BID WC    [Held by provider] hydrALAZINE  100 mg Oral BID    [Held by provider] isosorbide mononitrate  30 mg Oral Daily    tamsulosin  0.4 mg Oral Daily    pantoprazole  40 mg IntraVENous BID     PRN Meds: sodium chloride, sodium chloride flush, sodium chloride, sodium chloride flush, sodium chloride, sodium chloride flush, sodium chloride, ondansetron **OR** ondansetron, polyethylene glycol, acetaminophen **OR** acetaminophen, sodium chloride, ALPRAZolam      Intake/Output Summary (Last 24 hours) at 6/10/2025 1809  Last data filed at 6/10/2025 1800  Gross per 24 hour   Intake 1200.42 ml   Output 1200 ml   Net 0.42 ml       Physical Exam Performed:    BP (!) 140/56   Pulse 79   Temp 97.8 °F (36.6 °C) (Temporal)   Resp 22   Ht 1.778 m (5' 10\")   Wt 104.7 kg (230 lb 13.2 oz)   SpO2 91%   BMI 33.12  since admission  -Trend H/H  -Transfuse to keep hemoglobin >7  -s/p colonoscopy on 6/9  -GI consulted  - did require additonal blood transfusion today, one unit given for hg of 6.9  - bleeding scan negative      Hemorrhagic shock -resolved  -2/2 above  - Off pressors; monitor BP; keep MAP >65  -Daily electrolyte panel and CBC ordered and monitoring; replace electrolytes as needed      PAF  -Hold home eliquis     CKD IV  -Cr on admission 3.0; baseline 2.0-2.7  -Trend Cr  -Avoid nephrotoxins     Hypertension  -Hold home antihypertensives    Hyperlipidemia  CAD  -Continue home statin    Type 2 diabetes  -SSI ordered      Drugs that require monitoring for toxicity include: Lasix and the method of monitoring was/is Electrolyte panel for renal dysfunction    DVT ppx: Contraindicated  GI ppx: PPI  Diet: ADULT DIET; Regular; Low Fiber  Code Status: Full Code    PT/OT Eval Status: Ordered    Disposition:  Colonoscopy yesterday.  1U PRBC today, again.   . Monitoring hemoglobin.  Off pressors.    Okay to transfer out of ICU.    .      Juan M Cardenas MD  6/10/2025  6:09 PM

## 2025-06-10 NOTE — PROGRESS NOTES
Physical/Occupational Therapy Attempt  Chester Castano Jr.    PT and OT attempted follow-up treatments this afternoon. Pt initially unavailable due to in the room procedure and then HEATHER for additional procedure. Will follow-up tomorrow as pt status and schedule allow.  No charge.   Marina Fuentes, PT, DPT, CNS #[id number]  Karla De La Cruz, [facility] OTR/L [id number]

## 2025-06-10 NOTE — PLAN OF CARE
Problem: Chronic Conditions and Co-morbidities  Goal: Patient's chronic conditions and co-morbidity symptoms are monitored and maintained or improved  Outcome: Progressing  Flowsheets (Taken 6/9/2025 1149 by Florencio Joe, RN)  Care Plan - Patient's Chronic Conditions and Co-Morbidity Symptoms are Monitored and Maintained or Improved:   Monitor and assess patient's chronic conditions and comorbid symptoms for stability, deterioration, or improvement   Collaborate with multidisciplinary team to address chronic and comorbid conditions and prevent exacerbation or deterioration   Update acute care plan with appropriate goals if chronic or comorbid symptoms are exacerbated and prevent overall improvement and discharge     Problem: ABCDS Injury Assessment  Goal: Absence of physical injury  Outcome: Progressing     Problem: Skin/Tissue Integrity  Goal: Skin integrity remains intact  Description: 1.  Monitor for areas of redness and/or skin breakdown2.  Assess vascular access sites hourly3.  Every 4-6 hours minimum:  Change oxygen saturation probe site4.  Every 4-6 hours:  If on nasal continuous positive airway pressure, respiratory therapy assess nares and determine need for appliance change or resting period  Outcome: Progressing  Flowsheets (Taken 6/9/2025 1149 by Florencio Joe, RN)  Skin Integrity Remains Intact: Monitor for areas of redness and/or skin breakdown     Problem: Safety - Adult  Goal: Free from fall injury  Outcome: Progressing     Problem: Discharge Planning  Goal: Discharge to home or other facility with appropriate resources  Outcome: Progressing  Flowsheets (Taken 6/9/2025 1149 by Florencio Joe, RN)  Discharge to home or other facility with appropriate resources:   Identify barriers to discharge with patient and caregiver   Arrange for needed discharge resources and transportation as appropriate   Identify discharge learning needs (meds, wound care, etc)   Refer to discharge planning if patient

## 2025-06-10 NOTE — PROGRESS NOTES
Gastroenterology Progress Note      Chester Castano Jr. is a 89 y.o. male patient.  1. Paroxysmal atrial fibrillation (HCC)    2. Bright red blood per rectum        SUBJECTIVE: No bowel movements after colonoscopy.  No abdominal pain.        Physical    VITALS:  /61   Pulse 71   Temp 98 °F (36.7 °C) (Temporal)   Resp 19   Ht 1.778 m (5' 10\")   Wt 104.7 kg (230 lb 13.2 oz)   SpO2 92%   BMI 33.12 kg/m²   TEMPERATURE:  Current - Temp: 98 °F (36.7 °C); Max - Temp  Av.2 °F (36.2 °C)  Min: 96.7 °F (35.9 °C)  Max: 98 °F (36.7 °C)    Constitutional: Alert, comfortable. No acute distress.   Respiratory: Respirations nonlabored and normal muscle movement.    GI: Abdomen nondistended, soft, and nontender.    Neurological: Gross memory appears intact.  Patient is alert and oriented.     Data    Data Review:    Recent Labs     25  0600 25  0754 25  0436 25  1509 25  2237 06/10/25  0845   WBC 5.7  --  6.8  --   --  5.5   HGB 7.1*   < > 8.0* 6.4* 7.1* 6.9*   HCT 21.6*   < > 23.7* 19.3* 21.0* 20.7*   MCV 90.6  --  89.4  --   --  88.1     --  131*  --   --  109*    < > = values in this interval not displayed.     Recent Labs     25  0600 25  0436 06/10/25  1308    140 138   K 4.1 4.2 4.2    105 104   CO2 24 23 23   PHOS  --  4.2 4.3   BUN 48* 47* 40*   CREATININE 3.0* 3.0* 3.1*     Recent Labs     25  06   AST 11*   ALT 8*   BILITOT 0.3   ALKPHOS 64     No results for input(s): \"LIPASE\", \"AMYLASE\" in the last 72 hours.  Recent Labs     25  0600   PROTIME 15.9*   INR 1.25*     Invalid input(s): \"PTT\"           ASSESSMENT / PLAN      Painless hematochezia -CT bleeding scan was not done at admission due to renal insufficiency.  S/p colonoscopy 2025 with diverticulosis, blood in sigmoid, descending colon and part of the transverse colon.  Suspect this was a diverticular bleed.  Multiple polyps were noted and removed.  Small

## 2025-06-10 NOTE — CONSULTS
[facility], [facility]   Electrophysiology   Date: 6/10/2025  Reason for Consultation: Watchman Consideration   Consult Requesting Physician: Juan M Cardenas MD     CC: Fatigue   HPI: Chester Castano Jr. is a 89 y.o. 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 He was found to have PAF on his device and was started on Eliquis    Pt presented to hospital with rectal bleeding on Saturday. He was found to be profoundly anemia. He was found to have hemoglobin down to 6.8. He was given multiple units of blood and ultimately underwent colonoscopy, which noted polyps and diverticulosis. He had multiple polyps resected with placement of clips. Plans for tagged RBC scan later today. EP consulted for anti-coagulation recommendation/Watchman discussion.     Assessment:   Paroxysmal Atrial fibrillation  Acute Blood Loss Anemia  Tachy Chidi Syndrome S/p PPM (2018)  CAD s/p CABG  HTN  EZRA on CKD    Plan:   EP consulted for Watchman consideration/anti-coagulation recommendations. I interrogated his device today. He has very low AF burden. His longest episode was on June 1st lasting 1.5 hours, otherwise he has very low burden on his pacemaker over last year (<1% AT/AF burden). Given the severity of his acute anemia and GIB, the risks outweigh the benefit of continuing on anti-coagulation at this time. Continue to hold Eliquis and would stop for time being at discharge. Recommend keeping his Hgb >8 given his cardiac co-morbidities. We discussed DEANDRE closure during our visit and can consider Watchman work up as outpatient once he is more stable. He is not sure he would be interested in the procedure at his age and would like to discuss it further once he is feeling better, but overall he is in very good shape despite his age. He still lives at home with his wife. He is able to drive and do work in his

## 2025-06-10 NOTE — CARE COORDINATION
Case Management Assessment  Initial Evaluation    Date/Time of Evaluation: 6/10/2025 3:22 PM  Assessment Completed by: Lynda Perry RN    If patient is discharged prior to next notation, then this note serves as note for discharge by case management.    Patient Name: Chester Castano Jr.                   YOB: 1935  Diagnosis: Lower GI bleed [K92.2]                   Date / Time: 6/8/2025  5:05 AM    Patient Admission Status: Inpatient   Readmission Risk (Low < 19, Mod (19-27), High > 27): Readmission Risk Score: 22    Current PCP: Micah Sauceda MD  PCP verified by CM? Yes    Chart Reviewed: Yes      History Provided by: Patient, Medical Record  Patient Orientation: Alert and Oriented    Patient Cognition: Alert    Hospitalization in the last 30 days (Readmission):  No    If yes, Readmission Assessment in CM Navigator will be completed.    Advance Directives:      Code Status: Full Code   Patient's Primary Decision Maker is: Legal Next of Kin      Discharge Planning:    Patient lives with: Spouse/Significant Other Type of Home: House  Primary Care Giver: Self  Patient Support Systems include: Spouse/Significant Other, Family Members   Current Financial resources: Medicare  Current community resources: None  Current services prior to admission: None            Current DME:              Type of Home Care services:  (P) PT, OT, Nursing Services    ADLS  Prior functional level: Independent in ADLs/IADLs  Current functional level: Assistance with the following:, Mobility    PT AM-PAC: 19 /24  OT AM-PAC: 19 /24    Family can provide assistance at DC: Yes (spouse)  Would you like Case Management to discuss the discharge plan with any other family members/significant others, and if so, who? Yes (spouse)  Plans to Return to Present Housing: Yes  Other Identified Issues/Barriers to RETURNING to current housing: none  Potential Assistance needed at discharge: (P) Home Care            Potential

## 2025-06-11 PROBLEM — D62 ACUTE BLOOD LOSS ANEMIA: Status: ACTIVE | Noted: 2025-06-11

## 2025-06-11 LAB
ALBUMIN SERPL-MCNC: 3 G/DL (ref 3.4–5)
ANION GAP SERPL CALCULATED.3IONS-SCNC: 11 MMOL/L (ref 3–16)
BASOPHILS # BLD: 0 K/UL (ref 0–0.2)
BASOPHILS NFR BLD: 0.9 %
BUN SERPL-MCNC: 44 MG/DL (ref 7–20)
CALCIUM SERPL-MCNC: 7.8 MG/DL (ref 8.3–10.6)
CHLORIDE SERPL-SCNC: 101 MMOL/L (ref 99–110)
CO2 SERPL-SCNC: 24 MMOL/L (ref 21–32)
CREAT SERPL-MCNC: 3.6 MG/DL (ref 0.8–1.3)
DEPRECATED RDW RBC AUTO: 15.8 % (ref 12.4–15.4)
EOSINOPHIL # BLD: 0.1 K/UL (ref 0–0.6)
EOSINOPHIL NFR BLD: 1.6 %
FERRITIN SERPL IA-MCNC: 152 NG/ML (ref 30–400)
GFR SERPLBLD CREATININE-BSD FMLA CKD-EPI: 15 ML/MIN/{1.73_M2}
GLUCOSE SERPL-MCNC: 130 MG/DL (ref 70–99)
HCT VFR BLD AUTO: 21.5 % (ref 40.5–52.5)
HGB BLD-MCNC: 7.5 G/DL (ref 13.5–17.5)
LYMPHOCYTES # BLD: 1.1 K/UL (ref 1–5.1)
LYMPHOCYTES NFR BLD: 22.6 %
MCH RBC QN AUTO: 30.3 PG (ref 26–34)
MCHC RBC AUTO-ENTMCNC: 34.8 G/DL (ref 31–36)
MCV RBC AUTO: 86.9 FL (ref 80–100)
MONOCYTES # BLD: 0.5 K/UL (ref 0–1.3)
MONOCYTES NFR BLD: 10.8 %
NEUTROPHILS # BLD: 3.1 K/UL (ref 1.7–7.7)
NEUTROPHILS NFR BLD: 64.1 %
PHOSPHATE SERPL-MCNC: 4.8 MG/DL (ref 2.5–4.9)
PLATELET # BLD AUTO: 108 K/UL (ref 135–450)
PMV BLD AUTO: 8.9 FL (ref 5–10.5)
POTASSIUM SERPL-SCNC: 4 MMOL/L (ref 3.5–5.1)
RBC # BLD AUTO: 2.47 M/UL (ref 4.2–5.9)
SODIUM SERPL-SCNC: 136 MMOL/L (ref 136–145)
WBC # BLD AUTO: 4.9 K/UL (ref 4–11)

## 2025-06-11 PROCEDURE — 36592 COLLECT BLOOD FROM PICC: CPT

## 2025-06-11 PROCEDURE — 6360000002 HC RX W HCPCS: Performed by: STUDENT IN AN ORGANIZED HEALTH CARE EDUCATION/TRAINING PROGRAM

## 2025-06-11 PROCEDURE — 97530 THERAPEUTIC ACTIVITIES: CPT

## 2025-06-11 PROCEDURE — 82728 ASSAY OF FERRITIN: CPT

## 2025-06-11 PROCEDURE — 2580000003 HC RX 258: Performed by: INTERNAL MEDICINE

## 2025-06-11 PROCEDURE — 2500000003 HC RX 250 WO HCPCS: Performed by: INTERNAL MEDICINE

## 2025-06-11 PROCEDURE — 85025 COMPLETE CBC W/AUTO DIFF WBC: CPT

## 2025-06-11 PROCEDURE — 1200000000 HC SEMI PRIVATE

## 2025-06-11 PROCEDURE — 99232 SBSQ HOSP IP/OBS MODERATE 35: CPT | Performed by: SURGERY

## 2025-06-11 PROCEDURE — 99232 SBSQ HOSP IP/OBS MODERATE 35: CPT | Performed by: NURSE PRACTITIONER

## 2025-06-11 PROCEDURE — 2500000003 HC RX 250 WO HCPCS: Performed by: STUDENT IN AN ORGANIZED HEALTH CARE EDUCATION/TRAINING PROGRAM

## 2025-06-11 PROCEDURE — APPSS15 APP SPLIT SHARED TIME 0-15 MINUTES: Performed by: NURSE PRACTITIONER

## 2025-06-11 PROCEDURE — 80069 RENAL FUNCTION PANEL: CPT

## 2025-06-11 PROCEDURE — 97535 SELF CARE MNGMENT TRAINING: CPT

## 2025-06-11 PROCEDURE — 6360000002 HC RX W HCPCS: Performed by: INTERNAL MEDICINE

## 2025-06-11 PROCEDURE — APPNB30 APP NON BILLABLE TIME 0-30 MINS: Performed by: NURSE PRACTITIONER

## 2025-06-11 PROCEDURE — 6370000000 HC RX 637 (ALT 250 FOR IP): Performed by: STUDENT IN AN ORGANIZED HEALTH CARE EDUCATION/TRAINING PROGRAM

## 2025-06-11 RX ADMIN — ATORVASTATIN CALCIUM 20 MG: 20 TABLET, FILM COATED ORAL at 07:50

## 2025-06-11 RX ADMIN — TAMSULOSIN HYDROCHLORIDE 0.4 MG: 0.4 CAPSULE ORAL at 07:50

## 2025-06-11 RX ADMIN — SODIUM CHLORIDE, PRESERVATIVE FREE 10 ML: 5 INJECTION INTRAVENOUS at 20:43

## 2025-06-11 RX ADMIN — PANTOPRAZOLE SODIUM 40 MG: 40 INJECTION, POWDER, FOR SOLUTION INTRAVENOUS at 07:50

## 2025-06-11 RX ADMIN — CARVEDILOL 25 MG: 25 TABLET, FILM COATED ORAL at 16:50

## 2025-06-11 RX ADMIN — SODIUM CHLORIDE, PRESERVATIVE FREE 10 ML: 5 INJECTION INTRAVENOUS at 07:50

## 2025-06-11 RX ADMIN — CARVEDILOL 25 MG: 25 TABLET, FILM COATED ORAL at 07:50

## 2025-06-11 RX ADMIN — PANTOPRAZOLE SODIUM 40 MG: 40 INJECTION, POWDER, FOR SOLUTION INTRAVENOUS at 20:43

## 2025-06-11 RX ADMIN — SODIUM CHLORIDE 125 MG: 9 INJECTION, SOLUTION INTRAVENOUS at 12:53

## 2025-06-11 ASSESSMENT — PAIN SCALES - GENERAL
PAINLEVEL_OUTOF10: 0
PAINLEVEL_OUTOF10: 3
PAINLEVEL_OUTOF10: 0

## 2025-06-11 ASSESSMENT — PAIN DESCRIPTION - LOCATION: LOCATION: LEG

## 2025-06-11 ASSESSMENT — PAIN DESCRIPTION - ORIENTATION: ORIENTATION: LEFT

## 2025-06-11 NOTE — PROGRESS NOTES
Gastroenterology Progress Note      Chester Castano Jr. is a 89 y.o. male patient.  1. Paroxysmal atrial fibrillation (HCC)    2. Bright red blood per rectum        SUBJECTIVE: had a couple dark BMs.  No hematochezia. No abdominal pain.        Physical    VITALS:  BP (!) 115/50   Pulse 68   Temp 98 °F (36.7 °C) (Temporal)   Resp 20   Ht 1.778 m (5' 10\")   Wt 106.2 kg (234 lb 2.1 oz)   SpO2 95%   BMI 33.59 kg/m²   TEMPERATURE:  Current - Temp: 98 °F (36.7 °C); Max - Temp  Av.8 °F (36.6 °C)  Min: 97.1 °F (36.2 °C)  Max: 98.4 °F (36.9 °C)    Constitutional: Alert, comfortable. No acute distress.   Respiratory: Respirations nonlabored and normal muscle movement.    GI: Abdomen nondistended, soft, and nontender.    Neurological: Gross memory appears intact.  Patient is alert and oriented.     Data    Data Review:    Recent Labs     25  0436 25  1509 06/10/25  0845 06/10/25  1532 06/10/25  2109 25  0502   WBC 6.8  --  5.5  --   --  4.9   HGB 8.0*   < > 6.9* 8.3* 7.6* 7.5*   HCT 23.7*   < > 20.7* 24.3* 22.8* 21.5*   MCV 89.4  --  88.1  --   --  86.9   *  --  109*  --   --  108*    < > = values in this interval not displayed.     Recent Labs     25  0436 06/10/25  1308 25  0502    138 136   K 4.2 4.2 4.0    104 101   CO2 23 23 24   PHOS 4.2 4.3 4.8   BUN 47* 40* 44*   CREATININE 3.0* 3.1* 3.6*     No results for input(s): \"AST\", \"ALT\", \"BILIDIR\", \"BILITOT\", \"ALKPHOS\" in the last 72 hours.    Invalid input(s): \"ALB\"    No results for input(s): \"LIPASE\", \"AMYLASE\" in the last 72 hours.  No results for input(s): \"PROTIME\", \"INR\" in the last 72 hours.    Invalid input(s): \"PTT\"     Tagged RBC scan 6/10/25    IMPRESSION:  No evidence of active GI bleeding during study acquisition.    ASSESSMENT / PLAN      Painless hematochezia -CT bleeding scan was not done at admission due to renal insufficiency.  S/p colonoscopy 2025 with diverticulosis, blood in

## 2025-06-11 NOTE — PLAN OF CARE
Problem: Chronic Conditions and Co-morbidities  Goal: Patient's chronic conditions and co-morbidity symptoms are monitored and maintained or improved  6/11/2025 0804 by Asia Abreu RN  Outcome: Progressing  6/10/2025 2154 by Suzette Ellis RN  Outcome: Progressing  Flowsheets (Taken 6/10/2025 2000)  Care Plan - Patient's Chronic Conditions and Co-Morbidity Symptoms are Monitored and Maintained or Improved:   Monitor and assess patient's chronic conditions and comorbid symptoms for stability, deterioration, or improvement   Collaborate with multidisciplinary team to address chronic and comorbid conditions and prevent exacerbation or deterioration   Update acute care plan with appropriate goals if chronic or comorbid symptoms are exacerbated and prevent overall improvement and discharge     Problem: ABCDS Injury Assessment  Goal: Absence of physical injury  6/11/2025 0804 by Asia Abreu RN  Outcome: Progressing  6/10/2025 2154 by Suzette Ellis RN  Outcome: Progressing     Problem: Skin/Tissue Integrity  Goal: Skin integrity remains intact  Description: 1.  Monitor for areas of redness and/or skin breakdown2.  Assess vascular access sites hourly3.  Every 4-6 hours minimum:  Change oxygen saturation probe site4.  Every 4-6 hours:  If on nasal continuous positive airway pressure, respiratory therapy assess nares and determine need for appliance change or resting period  6/11/2025 0804 by Asia Abreu RN  Outcome: Progressing  6/10/2025 2154 by Suzette Ellis RN  Outcome: Progressing  Flowsheets (Taken 6/10/2025 2000)  Skin Integrity Remains Intact: Monitor for areas of redness and/or skin breakdown     Problem: Safety - Adult  Goal: Free from fall injury  6/11/2025 0804 by Asia Abreu RN  Outcome: Progressing  6/10/2025 2154 by Suzette Ellis RN  Outcome: Progressing     Problem: Discharge Planning  Goal: Discharge to home or other facility with appropriate

## 2025-06-11 NOTE — NURSE NAVIGATOR
[facility]  HEART FAILURE PROGRAM      Chester Castano Jr. 12/30/1935    History:  Past Medical History:   Diagnosis Date    Anxiety     Arthritis     CAD (coronary artery disease)     MI X 2 1992, 1997    Cancer (HCC)     BASAL CELL    GERD (gastroesophageal reflux disease)     Hyperlipidemia     Hypertension        ECHO:  6/9/2025    Left Ventricle: Low normal left ventricular systolic function with a visually estimated EF of 50 - 55%. EF by 2D Simpsons Biplane is 52%. Left ventricle is mildly dilated. Normal wall thickness. Normal wall motion. Abnormal diastolic function. Elevated left ventricular filling pressure.    Right Ventricle: Not well visualized. Mildly reduced systolic function objectively. RV Free Wall Peak S' is 8.9 cm/s.    Aortic Valve: Mild stenosis of the aortic valve. PV 2.1 m/s, MG 9 mmHg, DI 0.5, VENKAT 1.7 cm2, SVI 35 mL/m2.    Tricuspid Valve: Mild regurgitation. RVSP is 28 mmHg.    Left Atrium: Left atrium is moderately dilated.    Aorta: Normal sized aortic root. Dilated ascending aorta. Ao ascending diameter is 3.9 cm.    Image quality is technically difficult. Contrast used: Lumason. Technically difficult study with poor endocardial visualization.    ACE/ARB/ARNi:   BB: Coreg 25mg BID  Aldosterone Antagonist:   SGLT2:     History of sleep apnea: No    [place] Screen ordered: Yes    DM History: Yes  Consult for DM educator: No, A1c 6.2 on 5/5/2023      Last Hospital Admission: 3/24/2025 for b/l JAVI  Code Status: Full  Discharge plans: from home    Family Present: daughter and wife at bedside    Chester Castano was admitted to the hospital with rectal bleeding, required blood transfusions. He was transferred to [facility] from [facility]. Pt has hx of CHF and follows with Dr. Cool as outpatient. Diuretics currently on hold due to declining kidney function. Pt's wife states that she follows a low sodium diet when cooking and that pt had been following a fluid restriction of  Patient has been rescheduled at [facility] with Dr Frankel, today 02/01/19. Patient has been rescheduled due to the pipes bursting at [facility]. E-order added.

## 2025-06-11 NOTE — DISCHARGE INSTRUCTIONS
Guidelines for Heart Failure home management:    1. Continue to monitor weight first thing each morning. You should weigh yourself after using the bathroom and before you eat breakfast.    2. Report to your doctor any significant weight change. Remember that weight change of 2-3 lbs. in 1 day or 5 lbs in a week is \"significant\" and likely represents changes in \"fluid\" status.  If you are experiencing any swelling in your feet, ankles or abdomen, or shortness of breath, call your doctor.     3. You should restrict all sodium intake to 3000 milligrams (3 grams) a day. Depending on your status, you may also be asked to restrict fluid intake to no more that 64 oz/2 Liters a day. If uncertain, ask the nurse or physician.     4. Regular aerobic exercise is encouraged 30 minutes a day (walking, bike, swimming, etc.). For specific exercise recommendations, ask your physician.     5. Report to your doctor any change in symptoms (chest pain, worsening shortness of breath, increased dizziness or passing out, increased palpitations or ICD shock, trouble catching breath while lying down, increased edema or abdominal bloating). Remember that even \"minor\" changes in symptoms may be important. Also report any changes in medications including \"over the counter\" medications.     6. DO NOT take NSAID's for pain (i.e, Advil, Aleve, Motrin, ibuprofen, and many more) since these may cause serious problems in those with a history of CHF. If uncertain about the medication, call your doctor.    7. If you have new significant or ongoing diarrhea or vomiting, please call your doctor for further instructions. Taking a diuretic (water pill) with these symptoms can worsen dehydration.     8. If you have any questions or concerns you can always call the CHF  Resource Line( [phone].  It is available Monday thru Friday 8 am- 5 pm. Please leave a message and your call will be returned shortly.     Extra Heart Failure

## 2025-06-11 NOTE — PROGRESS NOTES
[facility]        Progress Note    Patient Name: Chester Castano Jr.  MRN: [id number]  YOB: 1935  Date of Evaluation: 2025    Chief Complaint: Lower GI bleeding    Subjective:  No acute events overnight  Denies pain  No nausea or vomiting, tolerating low fiber diet  Passing stool documented to be brown with red streaks  Resting in bed at this time      Vital Signs:  Patient Vitals for the past 24 hrs:   BP Temp Temp src Pulse Resp SpO2 Weight   25 1200 -- 97.8 °F (36.6 °C) -- 70 -- -- --   25 1100 (!) 132/55 -- -- 70 -- -- --   25 1000 (!) 128/52 -- -- 70 -- -- --   25 0900 (!) 115/50 -- -- 68 -- -- --   25 0800 121/71 98 °F (36.7 °C) Temporal 73 20 95 % --   25 0700 (!) 122/56 -- -- 68 -- 95 % --   25 0600 (!) 129/51 -- -- 69 -- 99 % --   25 0500 (!) 132/55 -- -- 65 -- 100 % --   25 0400 136/62 -- Temporal 69 20 90 % --   25 0305 -- -- -- 60 -- 96 % 106.2 kg (234 lb 2.1 oz)   25 0300 (!) 137/59 -- -- 78 -- -- --   25 0200 (!) 149/52 -- -- 76 -- (!) 88 % --   25 0100 (!) 135/59 -- -- 75 -- 92 % --   25 0041 -- -- -- 78 -- 90 % --   25 0000 (!) 156/70 97.7 °F (36.5 °C) Temporal 74 16 91 % --   06/10/25 2341 -- -- -- 73 -- (!) 89 % --   06/10/25 2300 138/64 -- -- 60 -- 91 % --   06/10/25 2205 -- -- -- 61 -- 94 % --   06/10/25 2200 (!) 141/99 -- -- 81 -- (!) 89 % --   06/10/25 2100 (!) 125/52 -- -- 64 -- 91 % --   06/10/25 2000 138/61 98.4 °F (36.9 °C) Temporal 75 20 92 % --   06/10/25 1815 (!) 164/63 -- -- 72 -- 92 % --   06/10/25 1615 (!) 140/56 97.8 °F (36.6 °C) Temporal 79 22 91 % --   06/10/25 1530 (!) 160/68 -- -- 80 -- -- --      TEMPERATURE HISTORY 24H: Temp (24hrs), Av.9 °F (36.6 °C), Min:97.7 °F (36.5 °C), Max:98.4 °F (36.9 °C)    BLOOD PRESSURE HISTORY: Systolic (36hrs), Av , Min:115 , Max:164    Diastolic (36hrs), Av, Min:45,  Max:99      Intake/Output:  I/O last 3 completed shifts:  In: 1320.4 [P.O.:720; Blood:600.4]  Out: 2150 [Urine:2150]  I/O this shift:  In: 1235 [I.V.:1235]  Out: 800 [Urine:800]  Drain/tube Output:       Physical Exam:  General: awake, alert, oriented to person, place, time  Cardiovascular:  regular rate and rhythm and no murmur noted  Lungs: clear to auscultation  Abdomen: soft, nontender, nondistended, bowel sounds normal    Labs:  CBC:    Recent Labs     06/09/25  0436 06/09/25  1509 06/10/25  0845 06/10/25  1532 06/10/25  2109 06/11/25  0502   WBC 6.8  --  5.5  --   --  4.9   HGB 8.0*   < > 6.9* 8.3* 7.6* 7.5*   HCT 23.7*   < > 20.7* 24.3* 22.8* 21.5*   *  --  109*  --   --  108*    < > = values in this interval not displayed.     BMP:    Recent Labs     06/09/25  0436 06/10/25  1308 06/11/25  0502    138 136   K 4.2 4.2 4.0    104 101   CO2 23 23 24   BUN 47* 40* 44*   CREATININE 3.0* 3.1* 3.6*   GLUCOSE 127* 166* 130*     Hepatic:    No results for input(s): \"AST\", \"ALT\", \"BILITOT\", \"ALKPHOS\" in the last 72 hours.    Invalid input(s): \"ALB\"    Amylase:  No results found for: \"AMYLASE\"  Lipase:  No results found for: \"LIPASE\"   Mag:    Lab Results   Component Value Date/Time    MG 2.2 07/05/2023 11:10 AM     Phos:     Lab Results   Component Value Date/Time    PHOS 4.8 06/11/2025 05:02 AM    PHOS 4.3 06/10/2025 01:08 PM      Coags:   Lab Results   Component Value Date/Time    PROTIME 15.9 06/08/2025 06:00 AM    INR 1.25 06/08/2025 06:00 AM       Cultures:  Anaerobic culture  No results found for: \"LABANAE\"  Fungus stain  No results found for requested labs within last 30 days.     Gram stain  No results found for requested labs within last 30 days.     Organism  No results found for: \"ORG\"  Surgical culture  No results found for: \"CXSURG\"  Blood culture 1  No results found for requested labs within last 30 days.     Blood culture 2  No results found for requested labs within last 30 days.

## 2025-06-11 NOTE — PROGRESS NOTES
[facility] Renal ICU Note    Patient Active Problem List   Diagnosis    BPH (benign prostatic hyperplasia)    CAD (coronary artery disease)    HTN (hypertension)    OA (osteoarthritis)    Edema    Hyperlipidemia    Claudication of both lower extremities    Sleep apnea    Symptomatic bradycardia    Tachy-monroe syndrome (HCC)    Pacemaker    Asymptomatic bilateral carotid artery stenosis    Chronic diastolic congestive heart failure (HCC)    Gastroesophageal reflux disease without esophagitis    Generalized anxiety disorder    History of colonic polyps    History of coronary artery bypass graft    History of coronary artery stent placement    S/P placement of cardiac pacemaker    S/P total knee arthroplasty    Type 2 diabetes mellitus with complication, without long-term current use of insulin (HCC)    CKD (chronic kidney disease), stage IV (HCC)    Proteinuria    Obesity, unspecified obesity severity, unspecified obesity type    Diverticulitis    NSVT (nonsustained ventricular tachycardia) (HCC)    Paroxysmal atrial fibrillation (HCC)    Chronic renal impairment    Lower GI bleed    Hemorrhagic shock (HCC)    SAUD (obstructive sleep apnea)    Acute decompensated heart failure (HCC)       Past Medical History:   has a past medical history of Anxiety, Arthritis, CAD (coronary artery disease), Cancer (HCC), GERD (gastroesophageal reflux disease), Hyperlipidemia, and Hypertension.    Past Social History:   reports that he quit smoking about 33 years ago. His smoking use included cigarettes. He started smoking about 63 years ago. He has a 45 pack-year smoking history. He has been exposed to tobacco smoke. He has never used smokeless tobacco. He reports current alcohol use. He reports that he does not use drugs.    Subjective:    Leg swelling but no shortness of breath  Review of Systems   Constitutional:  Positive for fatigue. Negative for activity change, appetite change, chills, fever and unexpected

## 2025-06-11 NOTE — PLAN OF CARE
Problem: ABCDS Injury Assessment  Goal: Absence of physical injury  Outcome: Progressing     Problem: Skin/Tissue Integrity  Goal: Skin integrity remains intact  Description: 1.  Monitor for areas of redness and/or skin breakdown2.  Assess vascular access sites hourly3.  Every 4-6 hours minimum:  Change oxygen saturation probe site4.  Every 4-6 hours:  If on nasal continuous positive airway pressure, respiratory therapy assess nares and determine need for appliance change or resting period  Outcome: Progressing     Problem: Safety - Adult  Goal: Free from fall injury  Outcome: Progressing     Problem: Chronic Conditions and Co-morbidities  Goal: Patient's chronic conditions and co-morbidity symptoms are monitored and maintained or improved  Outcome: Progressing     Problem: Pain  Goal: Verbalizes/displays adequate comfort level or baseline comfort level  Outcome: Progressing     Problem: Metabolic/Fluid and Electrolytes - Adult  Goal: Hemodynamic stability and optimal renal function maintained  Outcome: Progressing     Problem: Hematologic - Adult  Goal: Maintains hematologic stability  Outcome: Progressing     Problem: Discharge Planning  Goal: Discharge to home or other facility with appropriate resources  Outcome: Progressing  Flowsheets (Taken 6/10/2025 2000)  Discharge to home or other facility with appropriate resources:   Identify barriers to discharge with patient and caregiver   Arrange for needed discharge resources and transportation as appropriate   Identify discharge learning needs (meds, wound care, etc)   Arrange for interpreters to assist at discharge as needed   Refer to discharge planning if patient needs post-hospital services based on physician order or complex needs related to functional status, cognitive ability or social support system

## 2025-06-11 NOTE — PROGRESS NOTES
Occupational Therapy    [facility] - Inpatient Rehabilitation Department   Phone: [phone]    Occupational Therapy    [] Initial Evaluation            [x] Daily Treatment Note         [] Discharge Summary      Patient: Chester Castano Jr.   : 1935   MRN: [id number]   Date of Service:  2025    Admitting Diagnosis:  Lower GI bleed  Current Admission Summary: Patient admitted for bright red blood in the toilet. Painless bleeding, no pain in rectum. Required 2 units PRBC on admission. GI consulted.   Past Medical History:  has a past medical history of Anxiety, Arthritis, CAD (coronary artery disease), Cancer (HCC), GERD (gastroesophageal reflux disease), Hyperlipidemia, and Hypertension.  Past Surgical History:  has a past surgical history that includes Appendectomy; Tonsillectomy; Knee arthroscopy; TURP; Coronary angioplasty with stent; Carpal tunnel release; TURP (2010); Cardiac surgery (); Prostate surgery; Mohs surgery; pacemaker placement (10/04/2018); Colonoscopy (N/A, 2025); and Colonoscopy (2025).    Discharge Recommendations: Chester Castano Jr. scored a 19/24 on the AM-PAC ADL Inpatient form. Current research shows that an AM-PAC score of 18 or greater is typically associated with a discharge to the patient's home setting. Based on the patient's AM-PAC score, and their current ADL deficits, it is recommended that the patient have 2-3 sessions per week of Occupational Therapy at d/c to increase the patient's independence.  At this time, this patient demonstrates the endurance and safety to discharge home with Home Health OT and a follow up treatment frequency of 2-3x/wk.   Please see assessment section for further patient specific details.    HOME HEALTH CARE: LEVEL 1 STANDARD    - Initial home health evaluation to occur within 24-48 hours, in patient home   - Therapy to evaluate with goal of regaining prior level of functioning   - Therapy to evaluate if

## 2025-06-11 NOTE — PROGRESS NOTES
Hospitalist Progress Note    Name:  Chester Castano Jr.    /Age/Sex: 1935  (89 y.o. male)  MRN & CSN:  [id number] & [id number]    PCP: Micah Sauceda MD    Date of Admission: 2025    Patient Status:  Inpatient     Chief Complaint: No chief complaint on file.      Hospital Course:   Patient admitted for bright red blood in the toilet.  Painless bleeding, no pain in rectum.  Required 2 units PRBC on admission.  GI consulted.    Underwent colonoscopy on . Required 1 U PRBC after procedure.    Subjective:  Today is:  Hospital Day: 4.  Patient seen and examined in ICU-[id number].     Lying in bed.  Denies pain.    Family at bedside and updated.      Medications:  Reviewed    Infusion Medications    sodium chloride      sodium chloride      sodium chloride      sodium chloride      sodium chloride       Scheduled Medications    ferric gluconate  125 mg IntraVENous Daily    [Held by provider] furosemide  40 mg IntraVENous BID    sodium chloride flush  5-40 mL IntraVENous 2 times per day    lidocaine 1 % injection  50 mg IntraDERmal Once    sodium chloride flush  5-40 mL IntraVENous 2 times per day    atorvastatin  20 mg Oral Daily    carvedilol  25 mg Oral BID WC    [Held by provider] hydrALAZINE  100 mg Oral BID    [Held by provider] isosorbide mononitrate  30 mg Oral Daily    tamsulosin  0.4 mg Oral Daily    pantoprazole  40 mg IntraVENous BID     PRN Meds: sodium chloride, sodium chloride flush, sodium chloride, sodium chloride flush, sodium chloride, sodium chloride flush, sodium chloride, ondansetron **OR** ondansetron, polyethylene glycol, acetaminophen **OR** acetaminophen, sodium chloride, ALPRAZolam      Intake/Output Summary (Last 24 hours) at 2025  Last data filed at 2025 1610  Gross per 24 hour   Intake 1234.96 ml   Output 1950 ml   Net -715.04 ml       Physical Exam Performed:    BP (!) 145/59   Pulse 74   Temp 97.8 °F (36.6 °C)   Resp 20   Ht 1.778 m (5' 10\")

## 2025-06-11 NOTE — PROGRESS NOTES
[facility]   Electrophysiology Progress Note    Date: 6/11/2025  Reason for Consultation: Watchman Consideration   Consult Requesting Physician: Juan M Cardenas MD     CC: Fatigue   HPI: Chester Castano Jr. is a 89 y.o. 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 He was found to have PAF on his device and was started on Eliquis    Pt presented to hospital with rectal bleeding on Saturday. He was found to be profoundly anemia. He was found to have hemoglobin down to 6.8. He was given multiple units of blood and ultimately underwent colonoscopy, which noted polyps and diverticulosis. He had multiple polyps resected with placement of clips. Plans for tagged RBC scan later today. EP consulted for anti-coagulation recommendation/Watchman discussion.     Interval Hx: Today, he is being seen for follow up. He is up in the chair. He remains in Atrial paced rhythm with stable BP. He remains very weak and tired. He is frustrated about the on going issues with his anemia. NM GI blood scan yesterday negative    Patient seen and examined. Clinical notes reviewed. Telemetry reviewed.  No new complaints today. No major events overnight.   Denies having chest pain, palpitations, shortness of breath, orthopnea/PND, cough, or dizziness at the time of this visit.    Assessment:   Paroxysmal Atrial fibrillation  Acute Blood Loss Anemia  Tachy Chidi Syndrome S/p PPM (2018)  CAD s/p CABG  HTN  EZRA on CKD    Plan:   He is hemodynamically stable in Atrial paced rhythm. I interrogated his device yesterday. He has very low AF burden. His longest episode was on June 1st lasting 1.5 hours, otherwise he has very low burden on his pacemaker over last year (<1% AT/AF burden). Given the severity of his acute anemia and GIB, the risks outweigh the benefit of continuing on anti-coagulation at this time.  Continue to hold Eliquis and would stop for time being at discharge. Recommend keeping his Hgb >8 given his cardiac co-morbidities. We discussed DEANDRE closure during our visit and can consider Watchman work up as outpatient once he is more stable. Watchman information provided to patient. He is not sure he would be interested in the procedure at his age and would like to discuss it further once he is feeling better, but overall he is in very good shape despite his age. He still lives at home with his wife. He is able to drive and do work in his yard, including mowing his grass. Will have him follow up in EP clinic in 1-2 months to discuss Watchman in more detail with Dr. Christiansen as he will need to have his anemia/GIB more stable to undergo Watchman implantation as he needs uninterrupted anti-coagulation for 6 months post Watchman    No further EP recommendations. Will let him recover from his anemia/GIB issues and follow up with Dr. Christiansen in the clinic    Relevant available labs, and cardiovascular diagnostics, documents reviewed.   EC25  Atrial paced rhythm    Device transmission: 6/10/25       K+ 4, BUN 44, Cr 3.6, Ca 7,6  Hgb 7.6, Hct 21.5, PLT 108K    Echo: 25    Left Ventricle: Normal left ventricular systolic function with a visually estimated EF of 55 - 60%. EF by 2D Simpsons Biplane is 52%. Left ventricle is mildly dilated. Normal wall thickness. Normal wall motion.    Right Ventricle: Not well visualized. Right ventricle size is normal. Lead present in the right ventricle. Low normal systolic function.    Mitral Valve: There is mild posterior annular calcification noted.    Tricuspid Valve: Mild regurgitation. RVSP is 28 mmHg.    Left Atrium: Left atrium is moderately dilated.    Interatrial Septum: Agitated saline study appeared negative with and without provocation.    Right Atrium: Lead present in the right atrium. Right atrium is dilated.    Aorta: Normal sized aortic root. Dilated ascending aorta.

## 2025-06-11 NOTE — PROGRESS NOTES
[facility]      GENERAL CARDIOLOGY PROGRESS NOTE  [phone]          ASSESSMENT/PLAN:  Afib/ Eliquis, on hold d/t GI bleed, CHADS VASC 6  History of CAD, s/p CABG 2007  Bilateral leg edema on exam with ADHF  EZRA on CKD IV  SAUD on CPAP  History of Bradycardia s/p PPM  ABLA due to lower GIB    Cardiology consulted for anticoagulation recommendations.  Given active GI bleeding requiring blood transfusion, the risks of continuing anticoagulation outweighs the benefit.  Appreciate Cardiac EP recommendations regarding anticoagulation with recommendations to hold anticoagulation given severity of GIB and anemia.  Plan for outpatient follow up to discuss Watchman.  In my opinion patient would be an acceptable candidate for Watchman procedure.      Bilateral pitting leg edema on exam with elevated BNP 10K with moderate pleural effusion and congestion on CXR.  Findings are consistent with ADHF, acute on chronic LV diastolic HF.  Suspect etiology secondary to multiple blood transfusions in setting of CKD. Echo this visit demonstrates preserved LVEF, mildly reduced RV systolic function in setting of SAUD.  Received IV lasix.  Renal function declined.  Hold off on further diuresis pending Nephrology recommendations.  Would defer volume management to Nephrology.      Known CAD with remote history of CABG.  Continue statin.  Not on aspirin.      SAUD on CPAP, compliant.  Using mask at bedside.  Consider setting adjustment if not recently checked.      History of bradycardia s/p PPM.  Device check 5/1/25 reviewed; normal function, battery 7.5 years, EGM with AFIB (2 episodes), NSVT (2 episodes).  Continue BB.      Thank you for allowing to us to participate in the care or Chester Castano Jr. Cardiology will sign off with plan for volume management per Nephrology.  Please call with questions or changes in clinical status.      All questions and concerns were addressed to the patient/family.   Dyana Salas DO, MBA

## 2025-06-11 NOTE — TELEPHONE ENCOUNTER
Please offer them office spots with Dr. Christiansen as listed as it can be discussed further in office after he recovers

## 2025-06-11 NOTE — TELEPHONE ENCOUNTER
Patient daughter and wife stopped by office, patient is currently in ICU, said patient spoke w [facility] about the watchman. Patient has other issues going on will decide how to proceed from here.

## 2025-06-12 LAB
ALBUMIN SERPL-MCNC: 3.1 G/DL (ref 3.4–5)
ANION GAP SERPL CALCULATED.3IONS-SCNC: 12 MMOL/L (ref 3–16)
BASOPHILS # BLD: 0 K/UL (ref 0–0.2)
BASOPHILS NFR BLD: 0.8 %
BUN SERPL-MCNC: 50 MG/DL (ref 7–20)
CALCIUM SERPL-MCNC: 7.8 MG/DL (ref 8.3–10.6)
CHLORIDE SERPL-SCNC: 104 MMOL/L (ref 99–110)
CO2 SERPL-SCNC: 24 MMOL/L (ref 21–32)
CREAT SERPL-MCNC: 3.7 MG/DL (ref 0.8–1.3)
DEPRECATED RDW RBC AUTO: 15.9 % (ref 12.4–15.4)
EOSINOPHIL # BLD: 0.1 K/UL (ref 0–0.6)
EOSINOPHIL NFR BLD: 2 %
GFR SERPLBLD CREATININE-BSD FMLA CKD-EPI: 15 ML/MIN/{1.73_M2}
GLUCOSE SERPL-MCNC: 122 MG/DL (ref 70–99)
HCT VFR BLD AUTO: 22 % (ref 40.5–52.5)
HGB BLD-MCNC: 7.4 G/DL (ref 13.5–17.5)
LYMPHOCYTES # BLD: 1.3 K/UL (ref 1–5.1)
LYMPHOCYTES NFR BLD: 23.4 %
MCH RBC QN AUTO: 30 PG (ref 26–34)
MCHC RBC AUTO-ENTMCNC: 33.7 G/DL (ref 31–36)
MCV RBC AUTO: 88.8 FL (ref 80–100)
MONOCYTES # BLD: 0.6 K/UL (ref 0–1.3)
MONOCYTES NFR BLD: 11.1 %
NEUTROPHILS # BLD: 3.4 K/UL (ref 1.7–7.7)
NEUTROPHILS NFR BLD: 62.7 %
PHOSPHATE SERPL-MCNC: 4.5 MG/DL (ref 2.5–4.9)
PLATELET # BLD AUTO: 120 K/UL (ref 135–450)
PMV BLD AUTO: 9.1 FL (ref 5–10.5)
POTASSIUM SERPL-SCNC: 4.2 MMOL/L (ref 3.5–5.1)
RBC # BLD AUTO: 2.47 M/UL (ref 4.2–5.9)
SODIUM SERPL-SCNC: 140 MMOL/L (ref 136–145)
WBC # BLD AUTO: 5.5 K/UL (ref 4–11)

## 2025-06-12 PROCEDURE — 80069 RENAL FUNCTION PANEL: CPT

## 2025-06-12 PROCEDURE — 99231 SBSQ HOSP IP/OBS SF/LOW 25: CPT | Performed by: SURGERY

## 2025-06-12 PROCEDURE — 2500000003 HC RX 250 WO HCPCS: Performed by: STUDENT IN AN ORGANIZED HEALTH CARE EDUCATION/TRAINING PROGRAM

## 2025-06-12 PROCEDURE — 6360000002 HC RX W HCPCS: Performed by: STUDENT IN AN ORGANIZED HEALTH CARE EDUCATION/TRAINING PROGRAM

## 2025-06-12 PROCEDURE — 2500000003 HC RX 250 WO HCPCS: Performed by: INTERNAL MEDICINE

## 2025-06-12 PROCEDURE — 6370000000 HC RX 637 (ALT 250 FOR IP): Performed by: STUDENT IN AN ORGANIZED HEALTH CARE EDUCATION/TRAINING PROGRAM

## 2025-06-12 PROCEDURE — APPNB30 APP NON BILLABLE TIME 0-30 MINS: Performed by: NURSE PRACTITIONER

## 2025-06-12 PROCEDURE — 6360000002 HC RX W HCPCS: Performed by: INTERNAL MEDICINE

## 2025-06-12 PROCEDURE — 85025 COMPLETE CBC W/AUTO DIFF WBC: CPT

## 2025-06-12 PROCEDURE — 1200000000 HC SEMI PRIVATE

## 2025-06-12 PROCEDURE — 2580000003 HC RX 258: Performed by: INTERNAL MEDICINE

## 2025-06-12 PROCEDURE — APPSS15 APP SPLIT SHARED TIME 0-15 MINUTES: Performed by: NURSE PRACTITIONER

## 2025-06-12 RX ORDER — FUROSEMIDE 10 MG/ML
40 INJECTION INTRAMUSCULAR; INTRAVENOUS 2 TIMES DAILY
Status: COMPLETED | OUTPATIENT
Start: 2025-06-12 | End: 2025-06-13

## 2025-06-12 RX ORDER — CETIRIZINE HYDROCHLORIDE 10 MG/1
5 TABLET ORAL DAILY PRN
Status: DISCONTINUED | OUTPATIENT
Start: 2025-06-12 | End: 2025-06-19 | Stop reason: HOSPADM

## 2025-06-12 RX ADMIN — FUROSEMIDE 40 MG: 10 INJECTION, SOLUTION INTRAMUSCULAR; INTRAVENOUS at 14:30

## 2025-06-12 RX ADMIN — PANTOPRAZOLE SODIUM 40 MG: 40 INJECTION, POWDER, FOR SOLUTION INTRAVENOUS at 20:05

## 2025-06-12 RX ADMIN — SODIUM CHLORIDE, PRESERVATIVE FREE 10 ML: 5 INJECTION INTRAVENOUS at 20:05

## 2025-06-12 RX ADMIN — SODIUM CHLORIDE 125 MG: 9 INJECTION, SOLUTION INTRAVENOUS at 12:46

## 2025-06-12 RX ADMIN — SODIUM CHLORIDE, PRESERVATIVE FREE 10 ML: 5 INJECTION INTRAVENOUS at 08:01

## 2025-06-12 RX ADMIN — ATORVASTATIN CALCIUM 20 MG: 20 TABLET, FILM COATED ORAL at 08:01

## 2025-06-12 RX ADMIN — CARVEDILOL 25 MG: 25 TABLET, FILM COATED ORAL at 17:10

## 2025-06-12 RX ADMIN — TAMSULOSIN HYDROCHLORIDE 0.4 MG: 0.4 CAPSULE ORAL at 08:01

## 2025-06-12 RX ADMIN — PANTOPRAZOLE SODIUM 40 MG: 40 INJECTION, POWDER, FOR SOLUTION INTRAVENOUS at 08:00

## 2025-06-12 RX ADMIN — CARVEDILOL 25 MG: 25 TABLET, FILM COATED ORAL at 08:01

## 2025-06-12 ASSESSMENT — PAIN SCALES - GENERAL
PAINLEVEL_OUTOF10: 0

## 2025-06-12 NOTE — PROGRESS NOTES
Gastroenterology Progress Note      Chester Castano Jr. is a 89 y.o. male patient.  1. Paroxysmal atrial fibrillation (HCC)    2. Bright red blood per rectum        SUBJECTIVE:   No hematochezia. No abdominal pain.        Physical    VITALS:  BP (!) 150/68   Pulse 71   Temp 97 °F (36.1 °C)   Resp 20   Ht 1.778 m (5' 10\")   Wt 106.7 kg (235 lb 3.7 oz)   SpO2 96%   BMI 33.75 kg/m²   TEMPERATURE:  Current - Temp: 97 °F (36.1 °C); Max - Temp  Av.2 °F (36.2 °C)  Min: 96.6 °F (35.9 °C)  Max: 98 °F (36.7 °C)    Constitutional: Alert, comfortable. No acute distress.   Respiratory: Respirations nonlabored and normal muscle movement.    GI: Abdomen nondistended, soft, and nontender.    Neurological: Gross memory appears intact.  Patient is alert and oriented.     Data    Data Review:    Recent Labs     06/10/25  0845 06/10/25  1532 06/10/25  2109 25  0502 25  0525   WBC 5.5  --   --  4.9 5.5   HGB 6.9*   < > 7.6* 7.5* 7.4*   HCT 20.7*   < > 22.8* 21.5* 22.0*   MCV 88.1  --   --  86.9 88.8   *  --   --  108* 120*    < > = values in this interval not displayed.     Recent Labs     06/10/25  1308 25  0502 25  0525    136 140   K 4.2 4.0 4.2    101 104   CO2 23 24 24   PHOS 4.3 4.8 4.5   BUN 40* 44* 50*   CREATININE 3.1* 3.6* 3.7*     No results for input(s): \"AST\", \"ALT\", \"BILIDIR\", \"BILITOT\", \"ALKPHOS\" in the last 72 hours.    Invalid input(s): \"ALB\"    No results for input(s): \"LIPASE\", \"AMYLASE\" in the last 72 hours.  No results for input(s): \"PROTIME\", \"INR\" in the last 72 hours.    Invalid input(s): \"PTT\"     Tagged RBC scan 6/10/25    IMPRESSION:  No evidence of active GI bleeding during study acquisition.    ASSESSMENT / PLAN      Painless hematochezia -CT bleeding scan was not done at admission due to renal insufficiency.  S/p colonoscopy 2025 with diverticulosis, blood in sigmoid, descending colon and part of the transverse colon.  Suspect this

## 2025-06-12 NOTE — PROGRESS NOTES
[facility] Renal ICU Note    Patient Active Problem List   Diagnosis    BPH (benign prostatic hyperplasia)    CAD (coronary artery disease)    HTN (hypertension)    OA (osteoarthritis)    Edema    Hyperlipidemia    Claudication of both lower extremities    Sleep apnea    Symptomatic bradycardia    Tachy-monroe syndrome (HCC)    Pacemaker    Asymptomatic bilateral carotid artery stenosis    Chronic diastolic congestive heart failure (HCC)    Gastroesophageal reflux disease without esophagitis    Generalized anxiety disorder    History of colonic polyps    History of coronary artery bypass graft    History of coronary artery stent placement    S/P placement of cardiac pacemaker    S/P total knee arthroplasty    Type 2 diabetes mellitus with complication, without long-term current use of insulin (HCC)    CKD (chronic kidney disease), stage IV (HCC)    Proteinuria    Obesity, unspecified obesity severity, unspecified obesity type    Diverticulitis    NSVT (nonsustained ventricular tachycardia) (HCC)    Paroxysmal atrial fibrillation (HCC)    Chronic renal impairment    Lower GI bleed    Hemorrhagic shock (HCC)    SAUD (obstructive sleep apnea)    Acute decompensated heart failure (HCC)    Acute blood loss anemia       Past Medical History:   has a past medical history of Anxiety, Arthritis, CAD (coronary artery disease), Cancer (HCC), GERD (gastroesophageal reflux disease), Hyperlipidemia, and Hypertension.    Past Social History:   reports that he quit smoking about 33 years ago. His smoking use included cigarettes. He started smoking about 63 years ago. He has a 45 pack-year smoking history. He has been exposed to tobacco smoke. He has never used smokeless tobacco. He reports current alcohol use. He reports that he does not use drugs.    Subjective:    Arm and leg swelling worsening  Denies any shortness of breath    Review of Systems   Constitutional:  Positive for fatigue. Negative for activity

## 2025-06-12 NOTE — PLAN OF CARE
Problem: ABCDS Injury Assessment  Goal: Absence of physical injury  Outcome: Progressing     Problem: Skin/Tissue Integrity  Goal: Skin integrity remains intact  Description: 1.  Monitor for areas of redness and/or skin breakdown2.  Assess vascular access sites hourly3.  Every 4-6 hours minimum:  Change oxygen saturation probe site4.  Every 4-6 hours:  If on nasal continuous positive airway pressure, respiratory therapy assess nares and determine need for appliance change or resting period  Outcome: Progressing    Problem: Safety - Adult  Goal: Free from fall injury  Outcome: Progressing     Problem: Pain  Goal: Verbalizes/displays adequate comfort level or baseline comfort level  Outcome: Progressing    Problem: Metabolic/Fluid and Electrolytes - Adult  Goal: Hemodynamic stability and optimal renal function maintained  Outcome: Progressing     Problem: Hematologic - Adult  Goal: Maintains hematologic stability  Outcome: Progressing     Problem: Discharge Planning  Goal: Discharge to home or other facility with appropriate resources  Outcome: Progressing  Flowsheets (Taken 6/11/2025 2000)  Discharge to home or other facility with appropriate resources:   Identify barriers to discharge with patient and caregiver   Arrange for needed discharge resources and transportation as appropriate   Identify discharge learning needs (meds, wound care, etc)   Arrange for interpreters to assist at discharge as needed   Refer to discharge planning if patient needs post-hospital services based on physician order or complex needs related to functional status, cognitive ability or social support system

## 2025-06-12 NOTE — PLAN OF CARE
Problem: Chronic Conditions and Co-morbidities  Goal: Patient's chronic conditions and co-morbidity symptoms are monitored and maintained or improved  6/12/2025 0817 by Asia Abreu RN  Outcome: Progressing  6/11/2025 2240 by Suzette Ellis RN  Outcome: Progressing  Flowsheets (Taken 6/11/2025 2000)  Care Plan - Patient's Chronic Conditions and Co-Morbidity Symptoms are Monitored and Maintained or Improved:   Monitor and assess patient's chronic conditions and comorbid symptoms for stability, deterioration, or improvement   Collaborate with multidisciplinary team to address chronic and comorbid conditions and prevent exacerbation or deterioration   Update acute care plan with appropriate goals if chronic or comorbid symptoms are exacerbated and prevent overall improvement and discharge     Problem: ABCDS Injury Assessment  Goal: Absence of physical injury  6/12/2025 0817 by Asia Abreu RN  Outcome: Progressing  6/11/2025 2240 by Suzette Ellis RN  Outcome: Progressing     Problem: Skin/Tissue Integrity  Goal: Skin integrity remains intact  Description: 1.  Monitor for areas of redness and/or skin breakdown2.  Assess vascular access sites hourly3.  Every 4-6 hours minimum:  Change oxygen saturation probe site4.  Every 4-6 hours:  If on nasal continuous positive airway pressure, respiratory therapy assess nares and determine need for appliance change or resting period  6/12/2025 0817 by Asia Abreu RN  Outcome: Progressing  6/11/2025 2240 by Suzette Ellis RN  Outcome: Progressing  Flowsheets (Taken 6/11/2025 2000)  Skin Integrity Remains Intact: Monitor for areas of redness and/or skin breakdown     Problem: Safety - Adult  Goal: Free from fall injury  6/12/2025 0817 by Asia Abreu RN  Outcome: Progressing  6/11/2025 2240 by Suzette Ellis RN  Outcome: Progressing     Problem: Discharge Planning  Goal: Discharge to home or other facility with appropriate

## 2025-06-12 NOTE — PROGRESS NOTES
CHF Nutrition Education    Pt seen for heart failure diet education.  Nutrition therapy included low sodium diet guidelines, fluid restriction, and foods to choose and foods to avoid.  Patient tries to follow a low sodium diet and does not add salt to food. All questions answered and patient voiced understanding.  Time spent with patient: 10 minutes    Electronically signed by Jaclyn Cordero MS, RD, LD on 6/12/2025 at 11:17 AM

## 2025-06-12 NOTE — PROGRESS NOTES
Hospitalist Progress Note    Name:  Chester Castano Jr.    /Age/Sex: 1935  (89 y.o. male)  MRN & CSN:  [id number] & [id number]    PCP: Micah Sauceda MD    Date of Admission: 2025    Patient Status:  Inpatient     Chief Complaint: No chief complaint on file.      Hospital Course:   Patient admitted for bright red blood in the toilet.  Painless bleeding, no pain in rectum.  Required 2 units PRBC on admission.  GI consulted.    Underwent colonoscopy on . Required 1 U PRBC after procedure.    Subjective:  Today is:  Hospital Day: 5.  Patient seen and examined in 5TN-5564/5564-01.     Lying in bed.  Denies pain.    Family at bedside and updated.      Medications:  Reviewed    Infusion Medications    sodium chloride      sodium chloride      sodium chloride      sodium chloride      sodium chloride       Scheduled Medications    furosemide  40 mg IntraVENous BID    ferric gluconate  125 mg IntraVENous Daily    sodium chloride flush  5-40 mL IntraVENous 2 times per day    lidocaine 1 % injection  50 mg IntraDERmal Once    sodium chloride flush  5-40 mL IntraVENous 2 times per day    atorvastatin  20 mg Oral Daily    carvedilol  25 mg Oral BID WC    [Held by provider] hydrALAZINE  100 mg Oral BID    [Held by provider] isosorbide mononitrate  30 mg Oral Daily    tamsulosin  0.4 mg Oral Daily    pantoprazole  40 mg IntraVENous BID     PRN Meds: cetirizine, sodium chloride, sodium chloride flush, sodium chloride, sodium chloride flush, sodium chloride, sodium chloride flush, sodium chloride, ondansetron **OR** ondansetron, polyethylene glycol, acetaminophen **OR** acetaminophen, sodium chloride, ALPRAZolam      Intake/Output Summary (Last 24 hours) at 2025  Last data filed at 2025 0500  Gross per 24 hour   Intake --   Output 600 ml   Net -600 ml       Physical Exam Performed:    BP (!) 178/79   Pulse 60   Temp 97.9 °F (36.6 °C) (Oral)   Resp 13   Ht 1.778 m (5' 10\")   Wt 106.7

## 2025-06-12 NOTE — PROGRESS NOTES
Report called to 5T RN. Pt called family and let them know of transfer to 5564. Belongings packed and sent with patient.

## 2025-06-12 NOTE — PROGRESS NOTES
[facility]        Progress Note    Patient Name: Chester Castano Jr.  MRN: [id number]  YOB: 1935  Date of Evaluation: 2025    Chief Complaint: Lower GI bleeding    Subjective:  No acute events overnight  Denies pain  No nausea or vomiting, tolerating low fiber diet  Passing stool documented to be brown with red streaks  Up to chair at this time      Vital Signs:  Patient Vitals for the past 24 hrs:   BP Temp Temp src Pulse Resp SpO2 Weight   25 1200 -- 97 °F (36.1 °C) -- 71 20 -- --   25 1130 (!) 150/68 -- -- 71 -- 96 % --   25 0800 (!) 153/54 (!) 96.6 °F (35.9 °C) Temporal 66 16 96 % --   25 0600 (!) 144/60 -- -- 67 -- 94 % --   25 0500 -- -- -- 70 -- 94 % 106.7 kg (235 lb 3.7 oz)   25 0400 133/64 97.5 °F (36.4 °C) Temporal 87 14 91 % --   25 0200 (!) 155/71 -- -- 69 -- 94 % --   25 0000 (!) 145/53 98 °F (36.7 °C) Temporal 73 16 92 % --   25 2300 -- -- -- 74 -- 100 % --   25 2230 -- -- -- 60 -- 100 % --   25 2200 135/68 -- -- 81 -- -- --   25 2000 (!) 153/70 97 °F (36.1 °C) Temporal 90 20 96 % --   25 1900 (!) 145/59 -- -- 74 -- -- --   25 1600 (!) 150/60 -- -- 74 -- 94 % --   25 1500 (!) 146/54 -- -- 74 -- 93 % --   25 1400 -- -- -- 75 -- -- --      TEMPERATURE HISTORY 24H: Temp (24hrs), Av.2 °F (36.2 °C), Min:96.6 °F (35.9 °C), Max:98 °F (36.7 °C)    BLOOD PRESSURE HISTORY: Systolic (36hrs), Av , Min:115 , Max:155    Diastolic (36hrs), Av, Min:50, Max:71      Intake/Output:  I/O last 3 completed shifts:  In: 1235 [I.V.:1235]  Out: 2550 [Urine:2550]  No intake/output data recorded.  Drain/tube Output:       Physical Exam:  General: awake, alert, oriented to person, place, time  Cardiovascular:  regular rate and rhythm and no murmur noted  Lungs: clear to auscultation  Abdomen: soft, nontender, nondistended, bowel sounds normal    Labs:  CBC:  Propranolol Pregnancy And Lactation Text: This medication is Pregnancy Category C and it isn't known if it is safe during pregnancy. It is excreted in breast milk.

## 2025-06-13 ENCOUNTER — APPOINTMENT (OUTPATIENT)
Dept: INTERVENTIONAL RADIOLOGY/VASCULAR | Age: 89
End: 2025-06-13
Attending: STUDENT IN AN ORGANIZED HEALTH CARE EDUCATION/TRAINING PROGRAM
Payer: MEDICARE

## 2025-06-13 LAB
ABO/RH: NORMAL
ALBUMIN SERPL-MCNC: 3.2 G/DL (ref 3.4–5)
ANION GAP SERPL CALCULATED.3IONS-SCNC: 9 MMOL/L (ref 3–16)
ANTIBODY SCREEN: NORMAL
BASOPHILS # BLD: 0 K/UL (ref 0–0.2)
BASOPHILS NFR BLD: 0.9 %
BLOOD BANK DISPENSE STATUS: NORMAL
BLOOD BANK PRODUCT CODE: NORMAL
BPU ID: NORMAL
BUN SERPL-MCNC: 48 MG/DL (ref 7–20)
CALCIUM SERPL-MCNC: 8.3 MG/DL (ref 8.3–10.6)
CHLORIDE SERPL-SCNC: 108 MMOL/L (ref 99–110)
CO2 SERPL-SCNC: 27 MMOL/L (ref 21–32)
CREAT SERPL-MCNC: 3.4 MG/DL (ref 0.8–1.3)
DEPRECATED RDW RBC AUTO: 15.9 % (ref 12.4–15.4)
DESCRIPTION BLOOD BANK: NORMAL
EOSINOPHIL # BLD: 0.1 K/UL (ref 0–0.6)
EOSINOPHIL NFR BLD: 2.2 %
GFR SERPLBLD CREATININE-BSD FMLA CKD-EPI: 17 ML/MIN/{1.73_M2}
GLUCOSE SERPL-MCNC: 123 MG/DL (ref 70–99)
HAV IGM SERPL QL IA: NORMAL
HBV CORE IGM SERPL QL IA: NORMAL
HBV SURFACE AB SERPL IA-ACNC: <3.5 MIU/ML
HBV SURFACE AG SERPL QL IA: NORMAL
HCT VFR BLD AUTO: 20.2 % (ref 40.5–52.5)
HCT VFR BLD AUTO: 25.6 % (ref 40.5–52.5)
HCV AB SERPL QL IA: NORMAL
HGB BLD-MCNC: 6.8 G/DL (ref 13.5–17.5)
HGB BLD-MCNC: 8.6 G/DL (ref 13.5–17.5)
LYMPHOCYTES # BLD: 1 K/UL (ref 1–5.1)
LYMPHOCYTES NFR BLD: 19.3 %
MCH RBC QN AUTO: 30.4 PG (ref 26–34)
MCHC RBC AUTO-ENTMCNC: 33.8 G/DL (ref 31–36)
MCV RBC AUTO: 90 FL (ref 80–100)
MONOCYTES # BLD: 0.5 K/UL (ref 0–1.3)
MONOCYTES NFR BLD: 10.3 %
NEUTROPHILS # BLD: 3.6 K/UL (ref 1.7–7.7)
NEUTROPHILS NFR BLD: 67.3 %
PHOSPHATE SERPL-MCNC: 4.2 MG/DL (ref 2.5–4.9)
PLATELET # BLD AUTO: 127 K/UL (ref 135–450)
PMV BLD AUTO: 9.3 FL (ref 5–10.5)
POTASSIUM SERPL-SCNC: 4.3 MMOL/L (ref 3.5–5.1)
PTH-INTACT SERPL-MCNC: 138 PG/ML (ref 14–72)
RBC # BLD AUTO: 2.24 M/UL (ref 4.2–5.9)
SODIUM SERPL-SCNC: 144 MMOL/L (ref 136–145)
WBC # BLD AUTO: 5.3 K/UL (ref 4–11)

## 2025-06-13 PROCEDURE — 6360000002 HC RX W HCPCS: Performed by: STUDENT IN AN ORGANIZED HEALTH CARE EDUCATION/TRAINING PROGRAM

## 2025-06-13 PROCEDURE — 36415 COLL VENOUS BLD VENIPUNCTURE: CPT

## 2025-06-13 PROCEDURE — 85014 HEMATOCRIT: CPT

## 2025-06-13 PROCEDURE — 85018 HEMOGLOBIN: CPT

## 2025-06-13 PROCEDURE — 80069 RENAL FUNCTION PANEL: CPT

## 2025-06-13 PROCEDURE — 83970 ASSAY OF PARATHORMONE: CPT

## 2025-06-13 PROCEDURE — 80074 ACUTE HEPATITIS PANEL: CPT

## 2025-06-13 PROCEDURE — 1200000000 HC SEMI PRIVATE

## 2025-06-13 PROCEDURE — 86850 RBC ANTIBODY SCREEN: CPT

## 2025-06-13 PROCEDURE — 77001 FLUOROGUIDE FOR VEIN DEVICE: CPT

## 2025-06-13 PROCEDURE — 99152 MOD SED SAME PHYS/QHP 5/>YRS: CPT

## 2025-06-13 PROCEDURE — 94760 N-INVAS EAR/PLS OXIMETRY 1: CPT

## 2025-06-13 PROCEDURE — 36558 INSERT TUNNELED CV CATH: CPT

## 2025-06-13 PROCEDURE — 86900 BLOOD TYPING SEROLOGIC ABO: CPT

## 2025-06-13 PROCEDURE — 86706 HEP B SURFACE ANTIBODY: CPT

## 2025-06-13 PROCEDURE — 90935 HEMODIALYSIS ONE EVALUATION: CPT

## 2025-06-13 PROCEDURE — 86901 BLOOD TYPING SEROLOGIC RH(D): CPT

## 2025-06-13 PROCEDURE — 2580000003 HC RX 258: Performed by: INTERNAL MEDICINE

## 2025-06-13 PROCEDURE — 6360000002 HC RX W HCPCS: Performed by: INTERNAL MEDICINE

## 2025-06-13 PROCEDURE — 2500000003 HC RX 250 WO HCPCS: Performed by: INTERNAL MEDICINE

## 2025-06-13 PROCEDURE — 6370000000 HC RX 637 (ALT 250 FOR IP): Performed by: STUDENT IN AN ORGANIZED HEALTH CARE EDUCATION/TRAINING PROGRAM

## 2025-06-13 PROCEDURE — 76937 US GUIDE VASCULAR ACCESS: CPT

## 2025-06-13 PROCEDURE — 5A1D70Z PERFORMANCE OF URINARY FILTRATION, INTERMITTENT, LESS THAN 6 HOURS PER DAY: ICD-10-PCS | Performed by: INTERNAL MEDICINE

## 2025-06-13 PROCEDURE — 0JH63XZ INSERTION OF TUNNELED VASCULAR ACCESS DEVICE INTO CHEST SUBCUTANEOUS TISSUE AND FASCIA, PERCUTANEOUS APPROACH: ICD-10-PCS | Performed by: INTERNAL MEDICINE

## 2025-06-13 PROCEDURE — C1894 INTRO/SHEATH, NON-LASER: HCPCS

## 2025-06-13 PROCEDURE — 86923 COMPATIBILITY TEST ELECTRIC: CPT

## 2025-06-13 PROCEDURE — P9016 RBC LEUKOCYTES REDUCED: HCPCS

## 2025-06-13 PROCEDURE — 36430 TRANSFUSION BLD/BLD COMPNT: CPT

## 2025-06-13 PROCEDURE — 02HV33Z INSERTION OF INFUSION DEVICE INTO SUPERIOR VENA CAVA, PERCUTANEOUS APPROACH: ICD-10-PCS | Performed by: INTERNAL MEDICINE

## 2025-06-13 PROCEDURE — 85025 COMPLETE CBC W/AUTO DIFF WBC: CPT

## 2025-06-13 PROCEDURE — 2500000003 HC RX 250 WO HCPCS: Performed by: STUDENT IN AN ORGANIZED HEALTH CARE EDUCATION/TRAINING PROGRAM

## 2025-06-13 RX ORDER — MIDAZOLAM HYDROCHLORIDE 1 MG/ML
INJECTION, SOLUTION INTRAMUSCULAR; INTRAVENOUS PRN
Status: COMPLETED | OUTPATIENT
Start: 2025-06-13 | End: 2025-06-13

## 2025-06-13 RX ORDER — SODIUM CHLORIDE 9 MG/ML
INJECTION, SOLUTION INTRAVENOUS PRN
Status: DISCONTINUED | OUTPATIENT
Start: 2025-06-13 | End: 2025-06-18

## 2025-06-13 RX ORDER — LIDOCAINE HYDROCHLORIDE 10 MG/ML
7 INJECTION, SOLUTION EPIDURAL; INFILTRATION; INTRACAUDAL; PERINEURAL ONCE
Status: COMPLETED | OUTPATIENT
Start: 2025-06-13 | End: 2025-06-13

## 2025-06-13 RX ORDER — HEPARIN SODIUM 5000 [USP'U]/ML
3200 INJECTION, SOLUTION INTRAVENOUS; SUBCUTANEOUS ONCE
Status: COMPLETED | OUTPATIENT
Start: 2025-06-13 | End: 2025-06-13

## 2025-06-13 RX ORDER — LIDOCAINE HYDROCHLORIDE AND EPINEPHRINE 10; 10 MG/ML; UG/ML
7 INJECTION, SOLUTION INFILTRATION; PERINEURAL ONCE
Status: COMPLETED | OUTPATIENT
Start: 2025-06-13 | End: 2025-06-13

## 2025-06-13 RX ORDER — FENTANYL CITRATE 50 UG/ML
INJECTION, SOLUTION INTRAMUSCULAR; INTRAVENOUS PRN
Status: COMPLETED | OUTPATIENT
Start: 2025-06-13 | End: 2025-06-13

## 2025-06-13 RX ADMIN — ATORVASTATIN CALCIUM 20 MG: 20 TABLET, FILM COATED ORAL at 07:55

## 2025-06-13 RX ADMIN — SODIUM CHLORIDE, PRESERVATIVE FREE 10 ML: 5 INJECTION INTRAVENOUS at 20:23

## 2025-06-13 RX ADMIN — CARVEDILOL 25 MG: 25 TABLET, FILM COATED ORAL at 07:55

## 2025-06-13 RX ADMIN — MIDAZOLAM 0.5 MG: 1 INJECTION INTRAMUSCULAR; INTRAVENOUS at 13:28

## 2025-06-13 RX ADMIN — PANTOPRAZOLE SODIUM 40 MG: 40 INJECTION, POWDER, FOR SOLUTION INTRAVENOUS at 07:55

## 2025-06-13 RX ADMIN — LIDOCAINE HYDROCHLORIDE 7 ML: 10 INJECTION, SOLUTION EPIDURAL; INFILTRATION; INTRACAUDAL; PERINEURAL at 13:34

## 2025-06-13 RX ADMIN — LIDOCAINE HYDROCHLORIDE,EPINEPHRINE BITARTRATE 7 ML: 10; .01 INJECTION, SOLUTION INFILTRATION; PERINEURAL at 13:34

## 2025-06-13 RX ADMIN — SODIUM CHLORIDE 125 MG: 9 INJECTION, SOLUTION INTRAVENOUS at 19:03

## 2025-06-13 RX ADMIN — FUROSEMIDE 40 MG: 10 INJECTION, SOLUTION INTRAMUSCULAR; INTRAVENOUS at 07:55

## 2025-06-13 RX ADMIN — SODIUM CHLORIDE, PRESERVATIVE FREE 10 ML: 5 INJECTION INTRAVENOUS at 07:55

## 2025-06-13 RX ADMIN — MIDAZOLAM 1 MG: 1 INJECTION INTRAMUSCULAR; INTRAVENOUS at 13:07

## 2025-06-13 RX ADMIN — HEPARIN SODIUM 3200 UNITS: 5000 INJECTION INTRAVENOUS; SUBCUTANEOUS at 13:33

## 2025-06-13 RX ADMIN — FENTANYL CITRATE 50 MCG: 50 INJECTION, SOLUTION INTRAMUSCULAR; INTRAVENOUS at 13:23

## 2025-06-13 RX ADMIN — FENTANYL CITRATE 25 MCG: 50 INJECTION, SOLUTION INTRAMUSCULAR; INTRAVENOUS at 13:28

## 2025-06-13 RX ADMIN — CARVEDILOL 25 MG: 25 TABLET, FILM COATED ORAL at 18:02

## 2025-06-13 RX ADMIN — PANTOPRAZOLE SODIUM 40 MG: 40 INJECTION, POWDER, FOR SOLUTION INTRAVENOUS at 20:21

## 2025-06-13 RX ADMIN — TAMSULOSIN HYDROCHLORIDE 0.4 MG: 0.4 CAPSULE ORAL at 07:55

## 2025-06-13 ASSESSMENT — PAIN SCALES - GENERAL: PAINLEVEL_OUTOF10: 0

## 2025-06-13 NOTE — PRE SEDATION
Sedation Pre-Procedure Note    Patient Name: Chester Castano Jr.  YOB: 1935  Medical Record Number: [id number]  Date:  6/13/25  Time: 12:37 PM    Indication:  ESRD - tunneled HD catheter placement    Consent: I have discussed with the patient and/or the patient representative the indication, alternatives, and the possible risks and/or complications of the planned procedure and the anesthesia methods. The patient and/or patient representative appear to understand and agree to proceed.    Vital Signs:   Vitals:    06/13/25 1111   BP: (!) 164/67   Pulse: 76   Resp: 16   Temp: 97.8 °F (36.6 °C)   SpO2: 94%       Past Medical History:  Past Medical History:   Diagnosis Date    Anxiety     Arthritis     CAD (coronary artery disease)     MI X 2 1992, 1997    Cancer (HCC)     BASAL CELL    GERD (gastroesophageal reflux disease)     Hyperlipidemia     Hypertension        Past Surgical History:  Past Surgical History:   Procedure Laterality Date    APPENDECTOMY      CARDIAC SURGERY  2007    3 V    CARPAL TUNNEL RELEASE      BILAT    COLONOSCOPY N/A 6/9/2025    COLONOSCOPY POLYPECTOMY SNARE/BIOPSY performed by Tremayne Quintero MD at [facility]    COLONOSCOPY  6/9/2025    COLONOSCOPY SUBMUCOSAL/BOTOX INJECTION performed by Tremayne Quintero MD at [facility]    CORONARY ANGIOPLASTY WITH STENT PLACEMENT      1997    KNEE ARTHROSCOPY      RT    MOHS SURGERY      on his face    PACEMAKER PLACEMENT  10/04/2018    dual chamber    PROSTATE SURGERY      TONSILLECTOMY      TURP      TURP  12-    CYSTO INTERNAL URETHROTOMY       Medications:   Current Facility-Administered Medications   Medication Dose Route Frequency Provider Last Rate Last Admin    0.9 % sodium chloride infusion   IntraVENous PRN Juan M Cardenas MD        cetirizine (ZYRTEC) tablet 5 mg  5 mg Oral Daily PRN Juan M Cardenas MD        ferric gluconate (FERRLECIT) 125 mg in sodium chloride 0.9 % 100 mL IVPB  125 mg IntraVENous Daily

## 2025-06-13 NOTE — PROGRESS NOTES
Occupational Therapy  Chester Castano Jr.  3:38 PM  OT attempting to see per POC however pt is currently HEATHER. Will f/u when schedule permits.     Thank you.,   Nikia Heck, OTR/L, CNS ([id number]) 6/13/2025 3:38 PM

## 2025-06-13 NOTE — PLAN OF CARE
Problem: Chronic Conditions and Co-morbidities  Goal: Patient's chronic conditions and co-morbidity symptoms are monitored and maintained or improved  Outcome: Progressing     Problem: ABCDS Injury Assessment  Goal: Absence of physical injury  Outcome: Progressing     Problem: Skin/Tissue Integrity  Goal: Skin integrity remains intact  Description: 1.  Monitor for areas of redness and/or skin breakdown2.  Assess vascular access sites hourly3.  Every 4-6 hours minimum:  Change oxygen saturation probe site4.  Every 4-6 hours:  If on nasal continuous positive airway pressure, respiratory therapy assess nares and determine need for appliance change or resting period  Outcome: Progressing     Problem: Safety - Adult  Goal: Free from fall injury  Outcome: Progressing     Problem: Discharge Planning  Goal: Discharge to home or other facility with appropriate resources  Outcome: Progressing     Problem: Pain  Goal: Verbalizes/displays adequate comfort level or baseline comfort level  Outcome: Progressing     Problem: Metabolic/Fluid and Electrolytes - Adult  Goal: Hemodynamic stability and optimal renal function maintained  Outcome: Progressing     Problem: Hematologic - Adult  Goal: Maintains hematologic stability  Outcome: Progressing

## 2025-06-13 NOTE — BRIEF OP NOTE
Brief Postoperative Note    Chester Castano Jr.  YOB: 1935  [id number]    Pre-operative Diagnosis: ESRD    Post-operative Diagnosis: Same    Procedure: Tunneled HD catheter placement    Anesthesia: Local and Moderate Sedation    Surgeons/Assistants: Parker Plunkett M.D.    Estimated Blood Loss: less than 50     Complications: None    Specimens: Was Not Obtained    Findings: Successful right IJ approach tunneled HD catheter placement. Ready for immediate use.    Electronically signed by Parker Plunkett MD on 6/13/2025 at 1:49 PM

## 2025-06-13 NOTE — PROGRESS NOTES
[facility] Renal ICU Note    Patient Active Problem List   Diagnosis    BPH (benign prostatic hyperplasia)    CAD (coronary artery disease)    HTN (hypertension)    OA (osteoarthritis)    Edema    Hyperlipidemia    Claudication of both lower extremities    Sleep apnea    Symptomatic bradycardia    Tachy-monroe syndrome (HCC)    Pacemaker    Asymptomatic bilateral carotid artery stenosis    Chronic diastolic congestive heart failure (HCC)    Gastroesophageal reflux disease without esophagitis    Generalized anxiety disorder    History of colonic polyps    History of coronary artery bypass graft    History of coronary artery stent placement    S/P placement of cardiac pacemaker    S/P total knee arthroplasty    Type 2 diabetes mellitus with complication, without long-term current use of insulin (HCC)    CKD (chronic kidney disease), stage IV (HCC)    Proteinuria    Obesity, unspecified obesity severity, unspecified obesity type    Diverticulitis    NSVT (nonsustained ventricular tachycardia) (HCC)    Paroxysmal atrial fibrillation (HCC)    Chronic renal impairment    Lower GI bleed    Hemorrhagic shock (HCC)    SAUD (obstructive sleep apnea)    Acute decompensated heart failure (HCC)    Acute blood loss anemia       Past Medical History:   has a past medical history of Anxiety, Arthritis, CAD (coronary artery disease), Cancer (HCC), GERD (gastroesophageal reflux disease), Hyperlipidemia, and Hypertension.    Past Social History:   reports that he quit smoking about 33 years ago. His smoking use included cigarettes. He started smoking about 63 years ago. He has a 45 pack-year smoking history. He has been exposed to tobacco smoke. He has never used smokeless tobacco. He reports current alcohol use. He reports that he does not use drugs.    Subjective:    Arm and leg swelling worsening  Denies any shortness of breath  Transferred out of ICU    Review of Systems   Constitutional:  Positive for fatigue.

## 2025-06-13 NOTE — PROGRESS NOTES
Blood infusing at this time. Blood inspected, no crystallization, deformities, or abnormalities noted. Patient education on s/s of transfusion reaction. Patient tolerating well.

## 2025-06-13 NOTE — PROGRESS NOTES
Hospitalist Progress Note    Name:  Chester Castano Jr.    /Age/Sex: 1935  (89 y.o. male)  MRN & CSN:  [id number] & [id number]    PCP: Micah Sauceda MD    Date of Admission: 2025    Patient Status:  Inpatient     Chief Complaint: No chief complaint on file.      Hospital Course:   Patient admitted for bright red blood in the toilet.  Painless bleeding, no pain in rectum.  Required 2 units PRBC on admission.  GI consulted.    Underwent colonoscopy on . Required 1 U PRBC after procedure.    Subjective:  Today is:  Hospital Day: 6.  Patient seen and examined in 5T-5564/5564-01.     Lying in bed.  Denies pain.    Family at bedside and updated.      Medications:  Reviewed    Infusion Medications    sodium chloride      sodium chloride      sodium chloride      sodium chloride      sodium chloride      sodium chloride       Scheduled Medications    ferric gluconate  125 mg IntraVENous Daily    sodium chloride flush  5-40 mL IntraVENous 2 times per day    lidocaine 1 % injection  50 mg IntraDERmal Once    sodium chloride flush  5-40 mL IntraVENous 2 times per day    atorvastatin  20 mg Oral Daily    carvedilol  25 mg Oral BID WC    [Held by provider] hydrALAZINE  100 mg Oral BID    [Held by provider] isosorbide mononitrate  30 mg Oral Daily    tamsulosin  0.4 mg Oral Daily    pantoprazole  40 mg IntraVENous BID     PRN Meds: sodium chloride, cetirizine, sodium chloride, sodium chloride flush, sodium chloride, sodium chloride flush, sodium chloride, sodium chloride flush, sodium chloride, ondansetron **OR** ondansetron, polyethylene glycol, acetaminophen **OR** acetaminophen, sodium chloride, ALPRAZolam      Intake/Output Summary (Last 24 hours) at 2025  Last data filed at 2025 1328  Gross per 24 hour   Intake 498.64 ml   Output 1380 ml   Net -881.36 ml       Physical Exam Performed:    BP (!) 173/98   Pulse 63   Temp 97.6 °F (36.4 °C)   Resp 16   Ht 1.778 m (5' 10\")   Wt

## 2025-06-13 NOTE — PLAN OF CARE
Shift assessment completed. Routine vitals stable. Scheduled medications given with small sips of water due to NPO status. Patient is awake, alert and oriented. Respirations are easy and unlabored. Patient does not appear to be in distress, resting comfortably at this time. Call light within reach.    Problem: Chronic Conditions and Co-morbidities  Goal: Patient's chronic conditions and co-morbidity symptoms are monitored and maintained or improved  6/13/2025 1123 by Josefina Rachel RN  Outcome: Progressing     Problem: ABCDS Injury Assessment  Goal: Absence of physical injury  6/13/2025 1123 by Josefina Rachel RN  Outcome: Progressing     Problem: Skin/Tissue Integrity  Goal: Skin integrity remains intact  Description: 1.  Monitor for areas of redness and/or skin breakdown2.  Assess vascular access sites hourly3.  Every 4-6 hours minimum:  Change oxygen saturation probe site4.  Every 4-6 hours:  If on nasal continuous positive airway pressure, respiratory therapy assess nares and determine need for appliance change or resting period  6/13/2025 1123 by Josefina Rachel RN  Outcome: Progressing     Problem: Safety - Adult  Goal: Free from fall injury  6/13/2025 1123 by Josefina Rachel RN  Outcome: Progressing     Problem: Discharge Planning  Goal: Discharge to home or other facility with appropriate resources  6/13/2025 1123 by Josefina Rachel RN  Outcome: Progressing

## 2025-06-14 LAB
ALBUMIN SERPL-MCNC: 3.1 G/DL (ref 3.4–5)
ANION GAP SERPL CALCULATED.3IONS-SCNC: 9 MMOL/L (ref 3–16)
BASOPHILS # BLD: 0.1 K/UL (ref 0–0.2)
BASOPHILS NFR BLD: 0.9 %
BUN SERPL-MCNC: 37 MG/DL (ref 7–20)
CALCIUM SERPL-MCNC: 8.5 MG/DL (ref 8.3–10.6)
CHLORIDE SERPL-SCNC: 105 MMOL/L (ref 99–110)
CO2 SERPL-SCNC: 26 MMOL/L (ref 21–32)
CREAT SERPL-MCNC: 2.9 MG/DL (ref 0.8–1.3)
DEPRECATED RDW RBC AUTO: 15.3 % (ref 12.4–15.4)
EOSINOPHIL # BLD: 0.1 K/UL (ref 0–0.6)
EOSINOPHIL NFR BLD: 2 %
GFR SERPLBLD CREATININE-BSD FMLA CKD-EPI: 20 ML/MIN/{1.73_M2}
GLUCOSE SERPL-MCNC: 119 MG/DL (ref 70–99)
HCT VFR BLD AUTO: 24 % (ref 40.5–52.5)
HGB BLD-MCNC: 8.1 G/DL (ref 13.5–17.5)
LYMPHOCYTES # BLD: 0.8 K/UL (ref 1–5.1)
LYMPHOCYTES NFR BLD: 13.9 %
MCH RBC QN AUTO: 30.2 PG (ref 26–34)
MCHC RBC AUTO-ENTMCNC: 33.5 G/DL (ref 31–36)
MCV RBC AUTO: 90 FL (ref 80–100)
MONOCYTES # BLD: 0.6 K/UL (ref 0–1.3)
MONOCYTES NFR BLD: 9.8 %
NEUTROPHILS # BLD: 4.2 K/UL (ref 1.7–7.7)
NEUTROPHILS NFR BLD: 73.4 %
PHOSPHATE SERPL-MCNC: 3.8 MG/DL (ref 2.5–4.9)
PLATELET # BLD AUTO: 116 K/UL (ref 135–450)
PMV BLD AUTO: 9.1 FL (ref 5–10.5)
POTASSIUM SERPL-SCNC: 4.1 MMOL/L (ref 3.5–5.1)
RBC # BLD AUTO: 2.67 M/UL (ref 4.2–5.9)
SODIUM SERPL-SCNC: 140 MMOL/L (ref 136–145)
WBC # BLD AUTO: 5.7 K/UL (ref 4–11)

## 2025-06-14 PROCEDURE — 6360000002 HC RX W HCPCS: Performed by: STUDENT IN AN ORGANIZED HEALTH CARE EDUCATION/TRAINING PROGRAM

## 2025-06-14 PROCEDURE — 2500000003 HC RX 250 WO HCPCS: Performed by: STUDENT IN AN ORGANIZED HEALTH CARE EDUCATION/TRAINING PROGRAM

## 2025-06-14 PROCEDURE — 6370000000 HC RX 637 (ALT 250 FOR IP): Performed by: STUDENT IN AN ORGANIZED HEALTH CARE EDUCATION/TRAINING PROGRAM

## 2025-06-14 PROCEDURE — 6360000002 HC RX W HCPCS: Performed by: NURSE PRACTITIONER

## 2025-06-14 PROCEDURE — 2500000003 HC RX 250 WO HCPCS: Performed by: INTERNAL MEDICINE

## 2025-06-14 PROCEDURE — 6370000000 HC RX 637 (ALT 250 FOR IP): Performed by: NURSE PRACTITIONER

## 2025-06-14 PROCEDURE — 80069 RENAL FUNCTION PANEL: CPT

## 2025-06-14 PROCEDURE — 1200000000 HC SEMI PRIVATE

## 2025-06-14 PROCEDURE — 85025 COMPLETE CBC W/AUTO DIFF WBC: CPT

## 2025-06-14 RX ORDER — HYDRALAZINE HYDROCHLORIDE 20 MG/ML
5 INJECTION INTRAMUSCULAR; INTRAVENOUS
Status: DISCONTINUED | OUTPATIENT
Start: 2025-06-14 | End: 2025-06-19 | Stop reason: HOSPADM

## 2025-06-14 RX ORDER — HYDROCODONE BITARTRATE AND ACETAMINOPHEN 5; 325 MG/1; MG/1
1 TABLET ORAL
Status: DISCONTINUED | OUTPATIENT
Start: 2025-06-14 | End: 2025-06-19 | Stop reason: HOSPADM

## 2025-06-14 RX ADMIN — SODIUM CHLORIDE, PRESERVATIVE FREE 10 ML: 5 INJECTION INTRAVENOUS at 19:47

## 2025-06-14 RX ADMIN — CARVEDILOL 25 MG: 25 TABLET, FILM COATED ORAL at 17:19

## 2025-06-14 RX ADMIN — PANTOPRAZOLE SODIUM 40 MG: 40 INJECTION, POWDER, FOR SOLUTION INTRAVENOUS at 10:23

## 2025-06-14 RX ADMIN — Medication 10 ML: at 10:24

## 2025-06-14 RX ADMIN — PANTOPRAZOLE SODIUM 40 MG: 40 INJECTION, POWDER, FOR SOLUTION INTRAVENOUS at 19:47

## 2025-06-14 RX ADMIN — SODIUM CHLORIDE, PRESERVATIVE FREE 10 ML: 5 INJECTION INTRAVENOUS at 10:25

## 2025-06-14 RX ADMIN — ALPRAZOLAM 0.25 MG: 0.25 TABLET ORAL at 19:47

## 2025-06-14 RX ADMIN — TAMSULOSIN HYDROCHLORIDE 0.4 MG: 0.4 CAPSULE ORAL at 10:23

## 2025-06-14 RX ADMIN — SODIUM CHLORIDE, PRESERVATIVE FREE 10 ML: 5 INJECTION INTRAVENOUS at 10:24

## 2025-06-14 RX ADMIN — ATORVASTATIN CALCIUM 20 MG: 20 TABLET, FILM COATED ORAL at 10:23

## 2025-06-14 RX ADMIN — HYDROCODONE BITARTRATE AND ACETAMINOPHEN 1 TABLET: 5; 325 TABLET ORAL at 20:43

## 2025-06-14 RX ADMIN — CARVEDILOL 25 MG: 25 TABLET, FILM COATED ORAL at 10:23

## 2025-06-14 RX ADMIN — HYDRALAZINE HYDROCHLORIDE 5 MG: 20 INJECTION INTRAMUSCULAR; INTRAVENOUS at 20:43

## 2025-06-14 ASSESSMENT — PAIN SCALES - GENERAL
PAINLEVEL_OUTOF10: 9
PAINLEVEL_OUTOF10: 0

## 2025-06-14 ASSESSMENT — PAIN DESCRIPTION - ORIENTATION: ORIENTATION: RIGHT;LEFT

## 2025-06-14 ASSESSMENT — PAIN DESCRIPTION - LOCATION: LOCATION: LEG

## 2025-06-14 ASSESSMENT — PAIN DESCRIPTION - DESCRIPTORS: DESCRIPTORS: CRUSHING;DISCOMFORT

## 2025-06-14 NOTE — PROGRESS NOTES
Treatment time: 2 hrs    Net UF: 1000 ml    Pre weight: 108.9 kg  Post weight: 107.9 kg  EDW: TBD    Access used: Rt CW TDC  Access function: Well tolerated, 250 BFR    Medications or blood products given: none    Regular outpatient schedule: TBD    Summary of response to treatment: Pt tolerated first treatment well. No issues.

## 2025-06-14 NOTE — PROGRESS NOTES
Initial assessment completed upon shift start. Patient is alert and oriented x 4. Vital signs are within normal limits. Head to toe assessment performed (see flowsheet). Fall precautions in place. Bed in lowest position with bed alarm on. Call light within reach. Patient education reinforced on fall prevention and use of the call light. Will continue to monitor and reassess per protocol.

## 2025-06-14 NOTE — PROGRESS NOTES
[facility] Renal ICU Note    Patient Active Problem List   Diagnosis    BPH (benign prostatic hyperplasia)    CAD (coronary artery disease)    HTN (hypertension)    OA (osteoarthritis)    Edema    Hyperlipidemia    Claudication of both lower extremities    Sleep apnea    Symptomatic bradycardia    Tachy-monroe syndrome (HCC)    Pacemaker    Asymptomatic bilateral carotid artery stenosis    Chronic diastolic congestive heart failure (HCC)    Gastroesophageal reflux disease without esophagitis    Generalized anxiety disorder    History of colonic polyps    History of coronary artery bypass graft    History of coronary artery stent placement    S/P placement of cardiac pacemaker    S/P total knee arthroplasty    Type 2 diabetes mellitus with complication, without long-term current use of insulin (HCC)    CKD (chronic kidney disease), stage IV (HCC)    Proteinuria    Obesity, unspecified obesity severity, unspecified obesity type    Diverticulitis    NSVT (nonsustained ventricular tachycardia) (HCC)    Paroxysmal atrial fibrillation (HCC)    Chronic renal impairment    Lower GI bleed    Hemorrhagic shock (HCC)    SAUD (obstructive sleep apnea)    Acute decompensated heart failure (HCC)    Acute blood loss anemia       Past Medical History:   has a past medical history of Anxiety, Arthritis, CAD (coronary artery disease), Cancer (HCC), GERD (gastroesophageal reflux disease), Hyperlipidemia, and Hypertension.    Past Social History:   reports that he quit smoking about 33 years ago. His smoking use included cigarettes. He started smoking about 63 years ago. He has a 45 pack-year smoking history. He has been exposed to tobacco smoke. He has never used smokeless tobacco. He reports current alcohol use. He reports that he does not use drugs.    Subjective:      Denies any shortness of breath      Review of Systems   Constitutional:  Positive for fatigue. Negative for activity change, appetite change, chills,   06/14/2025    K 4.1 06/14/2025     06/14/2025    CO2 26 06/14/2025     Ferritin 152    Assessment/Plan:  1.  EZRA on CKD 4-Baseline creatinine has been variable, around 2.4-3.  Okay for PICC Line on dominant arm.  Hypervolemic.  High risk for contrast nephropathy including possible HD need.  Creatinine lower today.  With worsening edema and poor solute clearance, had long discussion with patient about initiation of HD.  He is agreeable to proceed.  He is interested in PD as future modality.  First HD 6/13. HD again today 6/14, then Monday.  2.  History of nephrotic range proteinuria-hold off on RAAS blockade.   3.  History of diabetes mellitus type 2  4.  History of hypertension-BP currently uncontrolled.  Goal /90 or less.  5.  Anemia-transfuse today.  GI  IV iron administration.  SIENA with HD.   6.  Hypocalcemia-better.  Phosp WNL    7.  1st HD 6/13, HD #2 6/14 and then iHD Monday and consult social work for outpatient HD placement at [facility]    High risk  Total time greater than 35-minutes    Tyrell Stevens DO

## 2025-06-14 NOTE — PROGRESS NOTES
V2.0    [facility] Progress Note      Name:  Chester Castano Jr. /Age/Sex: 1935  (89 y.o. male)   MRN & CSN:  [id number] & [id number] Encounter Date/Time: 2025 5:51 PM EDT   Location:  5T-5564/5564-01 PCP: Micah Sauceda MD     Attending:Juan M Cardenas MD       Hospital Day: 7    Assessment and Recommendations   Chester Castano Jr. is a 89 y.o. 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   Will need outpatient colonoscopy    Acute on CKd  Now on HD  Per nephrology    Hemorrhagic shock   Resolved    PAF  Eliqius on hold    DM  Ss           Diet ADULT DIET; Regular; Low Fiber   DVT Prophylaxis    Code Status Full Code   Disposition          Personally reviewed Lab Studies and Imaging         Subjective:     Chief Complaint:     Chester Castano Jr. is a 89 y.o. male who presents wH  Pt seen an evalauted  No overnight event  Tolerating HD well       Review of Systems:      Pertinent positives and negatives discussed in HPI    Objective:     Intake/Output Summary (Last 24 hours) at 2025 1751  Last data filed at 2025 1748  Gross per 24 hour   Intake 360 ml   Output 500 ml   Net -140 ml      Vitals:   Vitals:    25 0300 25 0530 25 0826 25 1710   BP:  (!) 151/72 (!) 159/66 (!) 179/72   Pulse: 63 60 60 56   Resp: 16 18 18    Temp: 97.6 °F (36.4 °C)  98.2 °F (36.8 °C) 98.1 °F (36.7 °C)   TempSrc: Oral  Oral Oral   SpO2: 95% 96% 96% 95%   Weight: 104.4 kg (230 lb 3.2 oz)      Height:             Physical Exam:      General: NAD  Eyes: EOMI  ENT: neck supple  Cardiovascular: Regular rate.  Respiratory: Clear to auscultation  Gastrointestinal: Soft, non tender  Genitourinary: no suprapubic tenderness  Musculoskeletal: No edema  Skin: warm, dry  Neuro: Alert.  Psych: Mood appropriate.         Medications:   Medications:    sodium chloride  BLEEDING STUDY 6/10/2025 TECHNIQUE: Following the intravenous injection of 27.2 mCi of 99 mTc-labeled RBC's, a flow study and standard images of the abdomen was obtained over a total period of 60 minutes COMPARISON: None. HISTORY: ORDERING SYSTEM PROVIDED HISTORY: diverticular bleed TECHNOLOGIST PROVIDED HISTORY: Reason for exam:->diverticular bleed Reason for Exam: diverticular bleed FINDINGS: Activity is seen within the blood pool, including the great vessels, heart, liver, and spleen.  A small amount of activity accumulates in the bladder over the course of the study. There was no abnormal accumulation of radioactivity in the abdomen to suggest active bleeding during study acquisition. Note is made of static activity seen in the pelvis with no migration proximally or distally to suggest active bleeding.     No evidence of active GI bleeding during study acquisition.     Echo (TTE) complete (PRN contrast/bubble/strain/3D)  Result Date: 6/10/2025    Left Ventricle: Low normal left ventricular systolic function with a visually estimated EF of 50 - 55%. EF by 2D Simpsons Biplane is 52%. Left ventricle is mildly dilated. Normal wall thickness. Normal wall motion. Abnormal diastolic function. Elevated left ventricular filling pressure.   Right Ventricle: Not well visualized. Mildly reduced systolic function objectively. RV Free Wall Peak S' is 8.9 cm/s.   Aortic Valve: Mild stenosis of the aortic valve. PV 2.1 m/s, MG 9 mmHg, DI 0.5, VENKAT 1.7 cm2, SVI 35 mL/m2.   Tricuspid Valve: Mild regurgitation. RVSP is 28 mmHg.   Left Atrium: Left atrium is moderately dilated.   Aorta: Normal sized aortic root. Dilated ascending aorta. Ao ascending diameter is 3.9 cm.   Image quality is technically difficult. Contrast used: Lumason. Technically difficult study with poor endocardial visualization.     XR CHEST PORTABLE  Result Date: 6/9/2025  EXAMINATION: ONE XRAY VIEW OF THE CHEST 6/9/2025 1:11 pm COMPARISON: None. HISTORY:

## 2025-06-14 NOTE — PLAN OF CARE
Problem: Chronic Conditions and Co-morbidities  Goal: Patient's chronic conditions and co-morbidity symptoms are monitored and maintained or improved  6/14/2025 1547 by Maria G Jolley RN  Outcome: Progressing  6/14/2025 0423 by Nick Block RN  Outcome: Progressing     Problem: ABCDS Injury Assessment  Goal: Absence of physical injury  6/14/2025 1547 by Maria G Jolley RN  Outcome: Progressing  6/14/2025 0423 by Nick Block RN  Outcome: Progressing     Problem: Skin/Tissue Integrity  Goal: Skin integrity remains intact  Description: 1.  Monitor for areas of redness and/or skin breakdown2.  Assess vascular access sites hourly3.  Every 4-6 hours minimum:  Change oxygen saturation probe site4.  Every 4-6 hours:  If on nasal continuous positive airway pressure, respiratory therapy assess nares and determine need for appliance change or resting period  6/14/2025 1547 by Maria G Jolley RN  Outcome: Progressing  6/14/2025 0423 by Nick Block RN  Outcome: Progressing     Problem: Safety - Adult  Goal: Free from fall injury  6/14/2025 1547 by Maria G Jolley RN  Outcome: Progressing  6/14/2025 0423 by Nick Block RN  Outcome: Progressing     Problem: Discharge Planning  Goal: Discharge to home or other facility with appropriate resources  6/14/2025 1547 by Maria G Jolley RN  Outcome: Progressing  6/14/2025 0423 by Nick Block RN  Outcome: Progressing     Problem: Pain  Goal: Verbalizes/displays adequate comfort level or baseline comfort level  6/14/2025 1547 by Maria G Jolley RN  Outcome: Progressing  6/14/2025 0423 by Nick Block RN  Outcome: Progressing     Problem: Metabolic/Fluid and Electrolytes - Adult  Goal: Hemodynamic stability and optimal renal function maintained  6/14/2025 1547 by Maria G Jolley RN  Outcome: Progressing  6/14/2025 0423 by Nick Block RN  Outcome: Progressing     Problem: Hematologic - Adult  Goal: Maintains hematologic

## 2025-06-14 NOTE — PROGRESS NOTES
Gastroenterology Progress Note      Chester Castano Jr. is a 89 y.o. male patient.  Principal Problem:    Lower GI bleed  Active Problems:    Paroxysmal atrial fibrillation (HCC)    CAD (coronary artery disease)    HTN (hypertension)    Hyperlipidemia    Chronic diastolic congestive heart failure (HCC)    Type 2 diabetes mellitus with complication, without long-term current use of insulin (HCC)    CKD (chronic kidney disease), stage IV (HCC)    Hemorrhagic shock (HCC)    SAUD (obstructive sleep apnea)    Acute decompensated heart failure (HCC)    Acute blood loss anemia  Resolved Problems:    * No resolved hospital problems. *      SUBJECTIVE: Anemia    Started dialysis yesterday    No further bleeding from the colon    ROS:  No fever, chills  No chest pain, palpitations  No SOB, cough  Gastrointestinal ROS: no abdominal pain, nausea, vomiting     Physical    VITALS:  BP (!) 159/66   Pulse 60   Temp 98.2 °F (36.8 °C) (Oral)   Resp 18   Ht 1.778 m (5' 10\")   Wt 104.4 kg (230 lb 3.2 oz)   SpO2 96%   BMI 33.03 kg/m²   TEMPERATURE:  Current - Temp: 98.2 °F (36.8 °C); Max - Temp  Av.7 °F (36.5 °C)  Min: 97.3 °F (36.3 °C)  Max: 98.2 °F (36.8 °C)    NAD  RRR, Nl s1s2  Lungs CTA Bilaterally, normal effort  Abdomen soft, ND, NT, no HSM, Bowel sounds normal.    Data    Data Review:    Recent Labs     25  0525  0555 25  1855 25  0605   WBC 5.5 5.3  --  5.7   HGB 7.4* 6.8* 8.6* 8.1*   HCT 22.0* 20.2* 25.6* 24.0*   MCV 88.8 90.0  --  90.0   * 127*  --  116*     Recent Labs     25  0525 25  0555 25  0605    144 140   K 4.2 4.3 4.1    108 105   CO2 24 27 26   PHOS 4.5 4.2 3.8   BUN 50* 48* 37*   CREATININE 3.7* 3.4* 2.9*     No results for input(s): \"AST\", \"ALT\", \"BILIDIR\", \"BILITOT\", \"ALKPHOS\" in the last 72 hours.    Invalid input(s): \"ALB\"  No results for input(s): \"LIPASE\", \"AMYLASE\" in the last 72 hours.  No results for input(s): \"PROTIME\",

## 2025-06-15 LAB
ALBUMIN SERPL-MCNC: 3.1 G/DL (ref 3.4–5)
ANION GAP SERPL CALCULATED.3IONS-SCNC: 9 MMOL/L (ref 3–16)
BASOPHILS # BLD: 0 K/UL (ref 0–0.2)
BASOPHILS NFR BLD: 0.7 %
BUN SERPL-MCNC: 38 MG/DL (ref 7–20)
CALCIUM SERPL-MCNC: 8.3 MG/DL (ref 8.3–10.6)
CHLORIDE SERPL-SCNC: 105 MMOL/L (ref 99–110)
CO2 SERPL-SCNC: 27 MMOL/L (ref 21–32)
CREAT SERPL-MCNC: 2.8 MG/DL (ref 0.8–1.3)
DEPRECATED RDW RBC AUTO: 15.7 % (ref 12.4–15.4)
EOSINOPHIL # BLD: 0.1 K/UL (ref 0–0.6)
EOSINOPHIL NFR BLD: 2.6 %
GFR SERPLBLD CREATININE-BSD FMLA CKD-EPI: 21 ML/MIN/{1.73_M2}
GLUCOSE SERPL-MCNC: 122 MG/DL (ref 70–99)
HCT VFR BLD AUTO: 23.5 % (ref 40.5–52.5)
HGB BLD-MCNC: 7.8 G/DL (ref 13.5–17.5)
LYMPHOCYTES # BLD: 1 K/UL (ref 1–5.1)
LYMPHOCYTES NFR BLD: 19.2 %
MCH RBC QN AUTO: 30.1 PG (ref 26–34)
MCHC RBC AUTO-ENTMCNC: 33.2 G/DL (ref 31–36)
MCV RBC AUTO: 90.6 FL (ref 80–100)
MONOCYTES # BLD: 0.6 K/UL (ref 0–1.3)
MONOCYTES NFR BLD: 11 %
NEUTROPHILS # BLD: 3.5 K/UL (ref 1.7–7.7)
NEUTROPHILS NFR BLD: 66.5 %
PHOSPHATE SERPL-MCNC: 3.9 MG/DL (ref 2.5–4.9)
PLATELET # BLD AUTO: 125 K/UL (ref 135–450)
PMV BLD AUTO: 8.8 FL (ref 5–10.5)
POTASSIUM SERPL-SCNC: 3.9 MMOL/L (ref 3.5–5.1)
RBC # BLD AUTO: 2.6 M/UL (ref 4.2–5.9)
SODIUM SERPL-SCNC: 141 MMOL/L (ref 136–145)
WBC # BLD AUTO: 5.3 K/UL (ref 4–11)

## 2025-06-15 PROCEDURE — 90935 HEMODIALYSIS ONE EVALUATION: CPT

## 2025-06-15 PROCEDURE — 85025 COMPLETE CBC W/AUTO DIFF WBC: CPT

## 2025-06-15 PROCEDURE — 2500000003 HC RX 250 WO HCPCS: Performed by: STUDENT IN AN ORGANIZED HEALTH CARE EDUCATION/TRAINING PROGRAM

## 2025-06-15 PROCEDURE — 2500000003 HC RX 250 WO HCPCS: Performed by: INTERNAL MEDICINE

## 2025-06-15 PROCEDURE — 6370000000 HC RX 637 (ALT 250 FOR IP): Performed by: STUDENT IN AN ORGANIZED HEALTH CARE EDUCATION/TRAINING PROGRAM

## 2025-06-15 PROCEDURE — 80069 RENAL FUNCTION PANEL: CPT

## 2025-06-15 PROCEDURE — 6360000002 HC RX W HCPCS: Performed by: STUDENT IN AN ORGANIZED HEALTH CARE EDUCATION/TRAINING PROGRAM

## 2025-06-15 PROCEDURE — 36556 INSERT NON-TUNNEL CV CATH: CPT

## 2025-06-15 PROCEDURE — 1200000000 HC SEMI PRIVATE

## 2025-06-15 PROCEDURE — 6370000000 HC RX 637 (ALT 250 FOR IP): Performed by: NURSE PRACTITIONER

## 2025-06-15 RX ORDER — HEPARIN SODIUM 1000 [USP'U]/ML
3200 INJECTION, SOLUTION INTRAVENOUS; SUBCUTANEOUS PRN
Status: DISCONTINUED | OUTPATIENT
Start: 2025-06-15 | End: 2025-06-19 | Stop reason: HOSPADM

## 2025-06-15 RX ORDER — HEPARIN SODIUM 1000 [USP'U]/ML
INJECTION, SOLUTION INTRAVENOUS; SUBCUTANEOUS
Status: DISPENSED
Start: 2025-06-15 | End: 2025-06-16

## 2025-06-15 RX ADMIN — TAMSULOSIN HYDROCHLORIDE 0.4 MG: 0.4 CAPSULE ORAL at 08:40

## 2025-06-15 RX ADMIN — HYDROCODONE BITARTRATE AND ACETAMINOPHEN 1 TABLET: 5; 325 TABLET ORAL at 20:01

## 2025-06-15 RX ADMIN — ATORVASTATIN CALCIUM 20 MG: 20 TABLET, FILM COATED ORAL at 08:40

## 2025-06-15 RX ADMIN — PANTOPRAZOLE SODIUM 40 MG: 40 INJECTION, POWDER, FOR SOLUTION INTRAVENOUS at 19:55

## 2025-06-15 RX ADMIN — SODIUM CHLORIDE, PRESERVATIVE FREE 10 ML: 5 INJECTION INTRAVENOUS at 08:40

## 2025-06-15 RX ADMIN — SODIUM CHLORIDE, PRESERVATIVE FREE 10 ML: 5 INJECTION INTRAVENOUS at 19:55

## 2025-06-15 RX ADMIN — ACETAMINOPHEN 650 MG: 325 TABLET ORAL at 14:49

## 2025-06-15 RX ADMIN — ALPRAZOLAM 0.25 MG: 0.25 TABLET ORAL at 19:56

## 2025-06-15 RX ADMIN — PANTOPRAZOLE SODIUM 40 MG: 40 INJECTION, POWDER, FOR SOLUTION INTRAVENOUS at 08:40

## 2025-06-15 RX ADMIN — CARVEDILOL 25 MG: 25 TABLET, FILM COATED ORAL at 08:40

## 2025-06-15 RX ADMIN — CARVEDILOL 25 MG: 25 TABLET, FILM COATED ORAL at 18:21

## 2025-06-15 ASSESSMENT — PAIN DESCRIPTION - DESCRIPTORS: DESCRIPTORS: ACHING;DISCOMFORT

## 2025-06-15 ASSESSMENT — PAIN SCALES - GENERAL
PAINLEVEL_OUTOF10: 3
PAINLEVEL_OUTOF10: 7

## 2025-06-15 ASSESSMENT — PAIN DESCRIPTION - LOCATION
LOCATION: BACK
LOCATION: NECK

## 2025-06-15 ASSESSMENT — PAIN SCALES - WONG BAKER: WONGBAKER_NUMERICALRESPONSE: HURTS A LITTLE BIT

## 2025-06-15 ASSESSMENT — PAIN - FUNCTIONAL ASSESSMENT: PAIN_FUNCTIONAL_ASSESSMENT: ACTIVITIES ARE NOT PREVENTED

## 2025-06-15 ASSESSMENT — PAIN DESCRIPTION - ORIENTATION: ORIENTATION: MID

## 2025-06-15 NOTE — PROGRESS NOTES
Hospitalist Progress Note    Name:  Chester Castano Jr.    /Age/Sex: 1935  (89 y.o. male)  MRN & CSN:  [id number] & [id number]    PCP: Micah Sauceda MD    Date of Admission: 2025    Patient Status:  Inpatient     Chief Complaint: No chief complaint on file.      Hospital Course:   Patient admitted for bright red blood in the toilet.  Painless bleeding, no pain in rectum.  Required 2 units PRBC on admission.  GI consulted.    Underwent colonoscopy on . Required 1 U PRBC after procedure.    Subjective:  Today is:  Hospital Day: 8.  Patient seen and examined in [facility]-5564/5564-01.     Lying in bed.  Denies pain.    Family at bedside and updated.      Medications:  Reviewed    Infusion Medications    sodium chloride      sodium chloride      sodium chloride      sodium chloride      sodium chloride      sodium chloride       Scheduled Medications    heparin (porcine)        sodium chloride flush  5-40 mL IntraVENous 2 times per day    lidocaine 1 % injection  50 mg IntraDERmal Once    sodium chloride flush  5-40 mL IntraVENous 2 times per day    atorvastatin  20 mg Oral Daily    carvedilol  25 mg Oral BID WC    [Held by provider] hydrALAZINE  100 mg Oral BID    [Held by provider] isosorbide mononitrate  30 mg Oral Daily    tamsulosin  0.4 mg Oral Daily    pantoprazole  40 mg IntraVENous BID     PRN Meds: heparin (porcine), heparin (porcine), hydrALAZINE, HYDROcodone 5 mg - acetaminophen, sodium chloride, cetirizine, sodium chloride, sodium chloride flush, sodium chloride, sodium chloride flush, sodium chloride, sodium chloride flush, sodium chloride, ondansetron **OR** ondansetron, polyethylene glycol, acetaminophen **OR** acetaminophen, sodium chloride, ALPRAZolam      Intake/Output Summary (Last 24 hours) at 6/15/2025 1703  Last data filed at 6/15/2025 0801  Gross per 24 hour   Intake 190 ml   Output 150 ml   Net 40 ml       Physical Exam Performed:    BP (!) 149/70   Pulse 61   Temp

## 2025-06-15 NOTE — PLAN OF CARE
Problem: Chronic Conditions and Co-morbidities  Goal: Patient's chronic conditions and co-morbidity symptoms are monitored and maintained or improved  6/15/2025 1550 by Maria G Jolley RN  Outcome: Progressing  6/15/2025 0320 by Delores Monzon RN  Outcome: Progressing     Problem: ABCDS Injury Assessment  Goal: Absence of physical injury  6/15/2025 1550 by Maria G Jolley RN  Outcome: Progressing  6/15/2025 0320 by Delores Monzon RN  Outcome: Progressing     Problem: Skin/Tissue Integrity  Goal: Skin integrity remains intact  Description: 1.  Monitor for areas of redness and/or skin breakdown2.  Assess vascular access sites hourly3.  Every 4-6 hours minimum:  Change oxygen saturation probe site4.  Every 4-6 hours:  If on nasal continuous positive airway pressure, respiratory therapy assess nares and determine need for appliance change or resting period  6/15/2025 1550 by Maria G Jolley RN  Outcome: Progressing  6/15/2025 0320 by Delores Monzon RN  Outcome: Progressing     Problem: Safety - Adult  Goal: Free from fall injury  6/15/2025 1550 by Maria G Jolley RN  Outcome: Progressing  6/15/2025 0320 by Delores Monzon RN  Outcome: Progressing     Problem: Discharge Planning  Goal: Discharge to home or other facility with appropriate resources  6/15/2025 1550 by Maria G Jolley RN  Outcome: Progressing  6/15/2025 0320 by Delores Monzon RN  Outcome: Progressing     Problem: Pain  Goal: Verbalizes/displays adequate comfort level or baseline comfort level  6/15/2025 1550 by Maria G Jolley RN  Outcome: Progressing  6/15/2025 0320 by Delores Monzon RN  Outcome: Not Progressing     Problem: Metabolic/Fluid and Electrolytes - Adult  Goal: Hemodynamic stability and optimal renal function maintained  6/15/2025 1550 by Maria G Jolley RN  Outcome: Progressing  6/15/2025 0320 by Delores Monzon RN  Outcome: Not Progressing     Problem: Hematologic - Adult  Goal: Maintains hematologic stability  6/15/2025 1550 by Maria G Jolley RN  Outcome:  Progressing  6/15/2025 0320 by Delores Monzon RN  Outcome: Progressing     Problem: Pain  Goal: Verbalizes/displays adequate comfort level or baseline comfort level  6/15/2025 1550 by Maria G Jolley RN  Outcome: Progressing  6/15/2025 0320 by Delores Monzon RN  Outcome: Not Progressing     Problem: Metabolic/Fluid and Electrolytes - Adult  Goal: Hemodynamic stability and optimal renal function maintained  6/15/2025 1550 by Maria G Jolley RN  Outcome: Progressing  6/15/2025 0320 by Delores Monzon RN  Outcome: Not Progressing

## 2025-06-15 NOTE — PROGRESS NOTES
[facility] Renal ICU Note    Patient Active Problem List   Diagnosis    BPH (benign prostatic hyperplasia)    CAD (coronary artery disease)    HTN (hypertension)    OA (osteoarthritis)    Edema    Hyperlipidemia    Claudication of both lower extremities    Sleep apnea    Symptomatic bradycardia    Tachy-monroe syndrome (HCC)    Pacemaker    Asymptomatic bilateral carotid artery stenosis    Chronic diastolic congestive heart failure (HCC)    Gastroesophageal reflux disease without esophagitis    Generalized anxiety disorder    History of colonic polyps    History of coronary artery bypass graft    History of coronary artery stent placement    S/P placement of cardiac pacemaker    S/P total knee arthroplasty    Type 2 diabetes mellitus with complication, without long-term current use of insulin (HCC)    CKD (chronic kidney disease), stage IV (HCC)    Proteinuria    Obesity, unspecified obesity severity, unspecified obesity type    Diverticulitis    NSVT (nonsustained ventricular tachycardia) (HCC)    Paroxysmal atrial fibrillation (HCC)    Chronic renal impairment    Lower GI bleed    Hemorrhagic shock (HCC)    SAUD (obstructive sleep apnea)    Acute decompensated heart failure (HCC)    Acute blood loss anemia       Past Medical History:   has a past medical history of Anxiety, Arthritis, CAD (coronary artery disease), Cancer (HCC), GERD (gastroesophageal reflux disease), Hyperlipidemia, and Hypertension.    Past Social History:   reports that he quit smoking about 33 years ago. His smoking use included cigarettes. He started smoking about 63 years ago. He has a 45 pack-year smoking history. He has been exposed to tobacco smoke. He has never used smokeless tobacco. He reports current alcohol use. He reports that he does not use drugs.    Subjective:    -was notified by HD charge nurse patient did not run iHD yesterday, unknown why.  -patient without new complaints today.  Denies any shortness of

## 2025-06-15 NOTE — PROGRESS NOTES
Gastroenterology Progress Note      Chester Castano Jr. is a 89 y.o. male patient.  Principal Problem:    Lower GI bleed  Active Problems:    Paroxysmal atrial fibrillation (HCC)    CAD (coronary artery disease)    HTN (hypertension)    Hyperlipidemia    Chronic diastolic congestive heart failure (HCC)    Type 2 diabetes mellitus with complication, without long-term current use of insulin (HCC)    CKD (chronic kidney disease), stage IV (HCC)    Hemorrhagic shock (HCC)    SAUD (obstructive sleep apnea)    Acute decompensated heart failure (HCC)    Acute blood loss anemia  Resolved Problems:    * No resolved hospital problems. 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    There is still a good amount of this polyp left that needs to be removed    Patient did report having some mild amount of bleeding today    His hemoglobin drifted down a little bit however this may be due to his dialysis        ROS:  No fever, chills  No chest pain, palpitations  No SOB, cough  Gastrointestinal ROS: no abdominal pain, nausea, vomiting     Physical    VITALS:  BP (!) 166/82   Pulse 60   Temp 97.7 °F (36.5 °C) (Oral)   Resp 18   Ht 1.778 m (5' 10\")   Wt 105.4 kg (232 lb 6.4 oz)   SpO2 96%   BMI 33.35 kg/m²   TEMPERATURE:  Current - Temp: 97.7 °F (36.5 °C); Max - Temp  Av.7 °F (36.5 °C)  Min: 97.4 °F (36.3 °C)  Max: 98.1 °F (36.7 °C)    NAD  RRR, Nl s1s2  Lungs CTA Bilaterally, normal effort  Abdomen soft, ND, NT, no HSM, Bowel sounds normal.    Data    Data Review:    Recent Labs     25  0555 25  1855 06/14/25  0605 06/15/25  0526   WBC 5.3  --  5.7 5.3   HGB 6.8* 8.6* 8.1* 7.8*   HCT 20.2* 25.6* 24.0* 23.5*   MCV 90.0  --  90.0 90.6   *  --  116* 125*     Recent Labs     25  0555 25  0605 06/15/25  05    140 141   K 4.3 4.1 3.9    105 105   CO2

## 2025-06-15 NOTE — PROGRESS NOTES
Treatment time: 2 Hours and 30 minutes    Net UF: 2.5 liters    Pre weight: 105.4 kg  Post weight: 103 kg  Dry weight: EZRA 2nd day of HD    Access used: Right CVC  Access function: Access site had clean dry an intact CVC dressing. NO signs of infection noted. No redness, hematoma nor swelling was observed. No discharges was seen. Both ports had good flow. Cleaned site and changed dressing aseptically.    Medications or blood products given: heparin dwell    Regular outpatient schedule: EZRA    Summary of response to treatment: 13:55: Treatment set at 2.5 liters for 2 hours and 30 minutes as ordered via Right chest wall. Access site had clean dry an intact CVC dressing. NO signs of infection noted. No redness, hematoma nor swelling was observed. No discharges was seen. Both ports had good flow. Cleaned site and changed dressing aseptically. Parameters set accordingly. Hooked to Hd machine. Monitored from time to time. 16:25: Treatment completed. Returned blood aseptically. HD tolerated well. WCM    Copy of dialysis treatment record placed in chart, to be scanned into EMR.

## 2025-06-15 NOTE — PROGRESS NOTES
Initial assessment completed upon shift start. Patient is alert and oriented x 4. Vital signs are within normal limits. Head to toe assessment performed (see flowsheet). Fall precautions in place. Bed in lowest position with bed alarm on. Patient education reinforced on fall prevention and use of the call light. Will continue to monitor and reassess per protocol.

## 2025-06-15 NOTE — PLAN OF CARE
Problem: Pain  Goal: Verbalizes/displays adequate comfort level or baseline comfort level  6/15/2025 0320 by Delores Monzon RN  Outcome: Not Progressing  6/14/2025 1547 by Maria G Jolley RN  Outcome: Progressing     Problem: Metabolic/Fluid and Electrolytes - Adult  Goal: Hemodynamic stability and optimal renal function maintained  6/15/2025 0320 by Delores Monzon RN  Outcome: Not Progressing  6/14/2025 1547 by Maria G Jollye RN  Outcome: Progressing     Problem: Chronic Conditions and Co-morbidities  Goal: Patient's chronic conditions and co-morbidity symptoms are monitored and maintained or improved  6/15/2025 0320 by Delores Monzon RN  Outcome: Progressing  6/14/2025 1547 by Maria G Jolley RN  Outcome: Progressing     Problem: ABCDS Injury Assessment  Goal: Absence of physical injury  6/15/2025 0320 by Delores Monzon RN  Outcome: Progressing  6/14/2025 1547 by Maria G Jolley RN  Outcome: Progressing     Problem: Skin/Tissue Integrity  Goal: Skin integrity remains intact  Description: 1.  Monitor for areas of redness and/or skin breakdown2.  Assess vascular access sites hourly3.  Every 4-6 hours minimum:  Change oxygen saturation probe site4.  Every 4-6 hours:  If on nasal continuous positive airway pressure, respiratory therapy assess nares and determine need for appliance change or resting period  6/15/2025 0320 by Delores Monzon RN  Outcome: Progressing  6/14/2025 1547 by Maria G Jolley RN  Outcome: Progressing     Problem: Safety - Adult  Goal: Free from fall injury  6/15/2025 0320 by Delores Monzon RN  Outcome: Progressing  6/14/2025 1547 by Maria G Jolley RN  Outcome: Progressing     Problem: Discharge Planning  Goal: Discharge to home or other facility with appropriate resources  6/15/2025 0320 by Delores Monzon RN  Outcome: Progressing  6/14/2025 1547 by Maria G Jolley RN  Outcome: Progressing     Problem: Hematologic - Adult  Goal: Maintains hematologic stability  6/15/2025 0320 by Delores Monzon RN  Outcome:

## 2025-06-16 LAB
ALBUMIN SERPL-MCNC: 3.2 G/DL (ref 3.4–5)
ANION GAP SERPL CALCULATED.3IONS-SCNC: 9 MMOL/L (ref 3–16)
BASOPHILS # BLD: 0 K/UL (ref 0–0.2)
BASOPHILS NFR BLD: 1 %
BUN SERPL-MCNC: 27 MG/DL (ref 7–20)
CALCIUM SERPL-MCNC: 8.2 MG/DL (ref 8.3–10.6)
CHLORIDE SERPL-SCNC: 105 MMOL/L (ref 99–110)
CO2 SERPL-SCNC: 26 MMOL/L (ref 21–32)
CREAT SERPL-MCNC: 2.5 MG/DL (ref 0.8–1.3)
DEPRECATED RDW RBC AUTO: 15.6 % (ref 12.4–15.4)
EOSINOPHIL # BLD: 0.1 K/UL (ref 0–0.6)
EOSINOPHIL NFR BLD: 2.6 %
GFR SERPLBLD CREATININE-BSD FMLA CKD-EPI: 24 ML/MIN/{1.73_M2}
GLUCOSE SERPL-MCNC: 115 MG/DL (ref 70–99)
HCT VFR BLD AUTO: 23.9 % (ref 40.5–52.5)
HGB BLD-MCNC: 8.3 G/DL (ref 13.5–17.5)
LYMPHOCYTES # BLD: 1.1 K/UL (ref 1–5.1)
LYMPHOCYTES NFR BLD: 23.1 %
MCH RBC QN AUTO: 30.7 PG (ref 26–34)
MCHC RBC AUTO-ENTMCNC: 34.5 G/DL (ref 31–36)
MCV RBC AUTO: 89 FL (ref 80–100)
MONOCYTES # BLD: 0.5 K/UL (ref 0–1.3)
MONOCYTES NFR BLD: 10.2 %
NEUTROPHILS # BLD: 2.9 K/UL (ref 1.7–7.7)
NEUTROPHILS NFR BLD: 63.1 %
PHOSPHATE SERPL-MCNC: 3.7 MG/DL (ref 2.5–4.9)
PLATELET # BLD AUTO: 128 K/UL (ref 135–450)
PMV BLD AUTO: 8.8 FL (ref 5–10.5)
POTASSIUM SERPL-SCNC: 3.9 MMOL/L (ref 3.5–5.1)
RBC # BLD AUTO: 2.69 M/UL (ref 4.2–5.9)
SODIUM SERPL-SCNC: 140 MMOL/L (ref 136–145)
WBC # BLD AUTO: 4.6 K/UL (ref 4–11)

## 2025-06-16 PROCEDURE — 80069 RENAL FUNCTION PANEL: CPT

## 2025-06-16 PROCEDURE — 85025 COMPLETE CBC W/AUTO DIFF WBC: CPT

## 2025-06-16 PROCEDURE — 2500000003 HC RX 250 WO HCPCS: Performed by: STUDENT IN AN ORGANIZED HEALTH CARE EDUCATION/TRAINING PROGRAM

## 2025-06-16 PROCEDURE — 6360000002 HC RX W HCPCS: Performed by: STUDENT IN AN ORGANIZED HEALTH CARE EDUCATION/TRAINING PROGRAM

## 2025-06-16 PROCEDURE — 36556 INSERT NON-TUNNEL CV CATH: CPT

## 2025-06-16 PROCEDURE — 6370000000 HC RX 637 (ALT 250 FOR IP): Performed by: STUDENT IN AN ORGANIZED HEALTH CARE EDUCATION/TRAINING PROGRAM

## 2025-06-16 PROCEDURE — 1200000000 HC SEMI PRIVATE

## 2025-06-16 PROCEDURE — 2500000003 HC RX 250 WO HCPCS: Performed by: INTERNAL MEDICINE

## 2025-06-16 PROCEDURE — 90935 HEMODIALYSIS ONE EVALUATION: CPT

## 2025-06-16 RX ORDER — HYDRALAZINE HYDROCHLORIDE 100 MG/1
TABLET, FILM COATED ORAL
Qty: 180 TABLET | Refills: 1 | OUTPATIENT
Start: 2025-06-16

## 2025-06-16 RX ORDER — SIMETHICONE 80 MG
80 TABLET,CHEWABLE ORAL EVERY 6 HOURS PRN
Status: DISCONTINUED | OUTPATIENT
Start: 2025-06-16 | End: 2025-06-19 | Stop reason: HOSPADM

## 2025-06-16 RX ADMIN — SODIUM CHLORIDE, PRESERVATIVE FREE 10 ML: 5 INJECTION INTRAVENOUS at 13:06

## 2025-06-16 RX ADMIN — SODIUM CHLORIDE, PRESERVATIVE FREE 10 ML: 5 INJECTION INTRAVENOUS at 20:36

## 2025-06-16 RX ADMIN — TAMSULOSIN HYDROCHLORIDE 0.4 MG: 0.4 CAPSULE ORAL at 13:05

## 2025-06-16 RX ADMIN — CARVEDILOL 25 MG: 25 TABLET, FILM COATED ORAL at 18:09

## 2025-06-16 RX ADMIN — PANTOPRAZOLE SODIUM 40 MG: 40 INJECTION, POWDER, FOR SOLUTION INTRAVENOUS at 20:31

## 2025-06-16 RX ADMIN — EPOETIN ALFA-EPBX 7000 UNITS: 10000 INJECTION, SOLUTION INTRAVENOUS; SUBCUTANEOUS at 12:01

## 2025-06-16 RX ADMIN — SODIUM CHLORIDE, PRESERVATIVE FREE 10 ML: 5 INJECTION INTRAVENOUS at 20:35

## 2025-06-16 RX ADMIN — ATORVASTATIN CALCIUM 20 MG: 20 TABLET, FILM COATED ORAL at 13:05

## 2025-06-16 RX ADMIN — PANTOPRAZOLE SODIUM 40 MG: 40 INJECTION, POWDER, FOR SOLUTION INTRAVENOUS at 08:19

## 2025-06-16 RX ADMIN — SODIUM CHLORIDE, PRESERVATIVE FREE 10 ML: 5 INJECTION INTRAVENOUS at 08:19

## 2025-06-16 RX ADMIN — CARVEDILOL 25 MG: 25 TABLET, FILM COATED ORAL at 13:05

## 2025-06-16 ASSESSMENT — ENCOUNTER SYMPTOMS
NAUSEA: 0
SHORTNESS OF BREATH: 0
BLOOD IN STOOL: 0
ABDOMINAL PAIN: 0
EYE REDNESS: 0
DIARRHEA: 0
ABDOMINAL DISTENTION: 0
EYE DISCHARGE: 0
PHOTOPHOBIA: 0
CONSTIPATION: 0
COUGH: 0
VOMITING: 0
FACIAL SWELLING: 0
CHEST TIGHTNESS: 0

## 2025-06-16 ASSESSMENT — PAIN DESCRIPTION - ORIENTATION: ORIENTATION: MID

## 2025-06-16 ASSESSMENT — PAIN SCALES - GENERAL: PAINLEVEL_OUTOF10: 2

## 2025-06-16 ASSESSMENT — PAIN DESCRIPTION - DESCRIPTORS: DESCRIPTORS: ACHING

## 2025-06-16 ASSESSMENT — PAIN DESCRIPTION - LOCATION: LOCATION: BACK

## 2025-06-16 ASSESSMENT — PAIN SCALES - WONG BAKER: WONGBAKER_NUMERICALRESPONSE: HURTS A LITTLE BIT

## 2025-06-16 NOTE — PLAN OF CARE
Problem: Chronic Conditions and Co-morbidities  Goal: Patient's chronic conditions and co-morbidity symptoms are monitored and maintained or improved  6/16/2025 1602 by Alisia Blair LPN  Outcome: Progressing  6/16/2025 0447 by Delores Monzon RN  Outcome: Progressing     Problem: ABCDS Injury Assessment  Goal: Absence of physical injury  6/16/2025 1602 by Alisia Blair LPN  Outcome: Progressing  6/16/2025 0447 by Delores Monzon RN  Outcome: Progressing     Problem: Skin/Tissue Integrity  Goal: Skin integrity remains intact  Description: 1.  Monitor for areas of redness and/or skin breakdown2.  Assess vascular access sites hourly3.  Every 4-6 hours minimum:  Change oxygen saturation probe site4.  Every 4-6 hours:  If on nasal continuous positive airway pressure, respiratory therapy assess nares and determine need for appliance change or resting period  6/16/2025 1602 by Alisia Blair LPN  Outcome: Progressing  6/16/2025 0447 by Delores Monzon RN  Outcome: Progressing     Problem: Safety - Adult  Goal: Free from fall injury  6/16/2025 1602 by Alisia Blair LPN  Outcome: Progressing  6/16/2025 0447 by Delores Monzon RN  Outcome: Progressing     Problem: Discharge Planning  Goal: Discharge to home or other facility with appropriate resources  6/16/2025 1602 by Alisia Blair LPN  Outcome: Progressing  6/16/2025 0447 by Delores Monzon RN  Outcome: Progressing     Problem: Pain  Goal: Verbalizes/displays adequate comfort level or baseline comfort level  6/16/2025 1602 by Alisia Blair LPN  Outcome: Progressing  6/16/2025 0447 by Delores Monzon RN  Outcome: Progressing     Problem: Metabolic/Fluid and Electrolytes - Adult  Goal: Hemodynamic stability and optimal renal function maintained  6/16/2025 1602 by Alisia Blair LPN  Outcome: Progressing  6/16/2025 0447 by Delores Monzon RN  Outcome: Progressing     Problem: Hematologic - Adult  Goal: Maintains hematologic stability  6/16/2025 1602 by  Alisia Blair LPN  Outcome: Progressing  6/16/2025 0447 by Delores Monzon RN  Outcome: Progressing

## 2025-06-16 NOTE — PROGRESS NOTES
Physical Therapy  Physical Therapy  Attempt Note    Name:Chester Castano   :1935  MRN:[id number]  Room: [facility]-5564/5564-01    Date of Service: 2025     PT attempted to see for PT tx at this time per POC however pt currently HEATHER. Will attempt again as therapy schedule permits.              Electronically signed by Grace Esparza PT on 2025 at 9:45 AM

## 2025-06-16 NOTE — CARE COORDINATION
Discharge Planning:     (CM) started a [facility] Chair today.    Remi Thompson  [address] [id number]  Phone [phone]  Fax [phone]    Electronically signed by Les Jeffers on 6/16/25 at 4:47 PM EDT

## 2025-06-16 NOTE — PROGRESS NOTES
Treatment time: 2 hours and 30 minutes    Net UF: 2.5 liters    Pre weight: 103 kg  Post weight: 110.5 kg  EDW: EZRA    Access used: Right CVC  Access function: Access site located on the Right chest wall had clean dry and intact CVC dressing. No signs of infection noted. No redness, hematoma nor swelling was observed. No discharges was seen. Both ports had good flow. Cleaned site and changed dressing septically.    Medications or blood products given: Retacrit 7000 units, Heparin dwell    Regular outpatient schedule: M,W,F    Summary of response to treatment: 09:30: Treatment set at 2.5 liters for 2 hours and 30 minutes via Right CVC. Access site located on the Right chest wall had clean dry and intact CVC dressing. No signs of infection noted. No redness, hematoma nor swelling was observed. No discharges was seen. Both ports had good flow. Cleaned site and changed dressing septically. parameters set accordingly. Hooked to HD machine., Monitored from time to time. 12:00: Treatment completed. Returned blood aseptically. Hd tolerated well.     Copy of dialysis treatment record placed in chart, to be scanned into EMR.

## 2025-06-16 NOTE — PROGRESS NOTES
Hospitalist Progress Note    Name:  Chester Castano Jr.    /Age/Sex: 1935  (89 y.o. male)  MRN & CSN:  [id number] & [id number]    PCP: Micah Sauceda MD    Date of Admission: 2025    Patient Status:  Inpatient     Chief Complaint: No chief complaint on file.      Hospital Course:   Patient admitted for bright red blood in the toilet.  Painless bleeding, no pain in rectum.  Required 2 units PRBC on admission.  GI consulted.    Underwent colonoscopy on .has polyp     Had to be started on HD with worsening EZRA.  Can be dc once HD set up    Subjective:  Today is:  Hospital Day: 9.  Patient seen and examined in [facility]-5564/5564-01.     Seen in HD,denies discomfort      Medications:  Reviewed    Infusion Medications    sodium chloride      sodium chloride      sodium chloride      sodium chloride      sodium chloride      sodium chloride       Scheduled Medications    sodium chloride flush  5-40 mL IntraVENous 2 times per day    lidocaine 1 % injection  50 mg IntraDERmal Once    sodium chloride flush  5-40 mL IntraVENous 2 times per day    atorvastatin  20 mg Oral Daily    carvedilol  25 mg Oral BID WC    [Held by provider] hydrALAZINE  100 mg Oral BID    [Held by provider] isosorbide mononitrate  30 mg Oral Daily    tamsulosin  0.4 mg Oral Daily    pantoprazole  40 mg IntraVENous BID     PRN Meds: simethicone, heparin (porcine), hydrALAZINE, HYDROcodone 5 mg - acetaminophen, sodium chloride, cetirizine, sodium chloride, sodium chloride flush, sodium chloride, sodium chloride flush, sodium chloride, sodium chloride flush, sodium chloride, ondansetron **OR** ondansetron, polyethylene glycol, acetaminophen **OR** acetaminophen, sodium chloride, ALPRAZolam    No intake or output data in the 24 hours ending 25 1625      Physical Exam Performed:    /60   Pulse 65   Temp 98 °F (36.7 °C)   Resp 17   Ht 1.778 m (5' 10\")   Wt 103 kg (227 lb 1.2 oz)   SpO2 95%   BMI 32.58 kg/m²  chronic illness:   ---------------------------------------------------------------------    B. Risk of Treatment (any 1 for high)   [x] Drugs/treatments that require intensive monitoring for toxicity    [] Change in code status  [] Decision to escalate care  [] Major surgery/procedure with associated risk factors  [] Prescription drug management  ----------------------------------------------------------------------    C. Data (any 2 for high)  [] Discussed management of the case with   [] With case management at multi-disciplinary rounds    []  who recommended   [] Imaging personally reviewed and interpreted, includes:    [] EKG or telemetry (rhythm) strip    [] Other:   [] Data Review (any 3)  [] Collateral history obtained from:    [] All available consultant notes from yesterday/today were reviewed  [] All current labs were reviewed and interpreted for clinical significance   [] Appropriate follow-up labs were ordered  ----------------------------------------------------------------------    D. Time  [] >25 min (low)  [] >35 min (mod)  [] >50 min (high)  [] Critical care time (>30 min)  ----------------------------------------------------------------------      Active Hospital Problems    Diagnosis     Paroxysmal atrial fibrillation (HCC) [I48.0]      Priority: Medium    Acute blood loss anemia [D62]     Acute decompensated heart failure (HCC) [I50.9]     SAUD (obstructive sleep apnea) [G47.33]     Lower GI bleed [K92.2]     Hemorrhagic shock (HCC) [R57.8]     CKD (chronic kidney disease), stage IV (HCC) [N18.4]     Chronic diastolic congestive heart failure (HCC) [I50.32]     Type 2 diabetes mellitus with complication, without long-term current use of insulin (HCC) [E11.8]     Hyperlipidemia [E78.5]     HTN (hypertension) [I10]     CAD (coronary artery disease) [I25.10]          Hospital Day: 9    This is a 89 y.o. male who presented to [facility] on 6/8/2025 and is being treated for:    ABLA  -Hgb 7.1 on

## 2025-06-16 NOTE — PROGRESS NOTES
[facility]al Note    Patient Active Problem List   Diagnosis    BPH (benign prostatic hyperplasia)    CAD (coronary artery disease)    HTN (hypertension)    OA (osteoarthritis)    Edema    Hyperlipidemia    Claudication of both lower extremities    Sleep apnea    Symptomatic bradycardia    Tachy-monroe syndrome (HCC)    Pacemaker    Asymptomatic bilateral carotid artery stenosis    Chronic diastolic congestive heart failure (HCC)    Gastroesophageal reflux disease without esophagitis    Generalized anxiety disorder    History of colonic polyps    History of coronary artery bypass graft    History of coronary artery stent placement    S/P placement of cardiac pacemaker    S/P total knee arthroplasty    Type 2 diabetes mellitus with complication, without long-term current use of insulin (HCC)    CKD (chronic kidney disease), stage IV (HCC)    Proteinuria    Obesity, unspecified obesity severity, unspecified obesity type    Diverticulitis    NSVT (nonsustained ventricular tachycardia) (HCC)    Paroxysmal atrial fibrillation (HCC)    Chronic renal impairment    Lower GI bleed    Hemorrhagic shock (HCC)    SAUD (obstructive sleep apnea)    Acute decompensated heart failure (HCC)    Acute blood loss anemia       Past Medical History:   has a past medical history of Anxiety, Arthritis, CAD (coronary artery disease), Cancer (HCC), GERD (gastroesophageal reflux disease), Hyperlipidemia, and Hypertension.    Past Social History:   reports that he quit smoking about 33 years ago. His smoking use included cigarettes. He started smoking about 63 years ago. He has a 45 pack-year smoking history. He has been exposed to tobacco smoke. He has never used smokeless tobacco. He reports current alcohol use. He reports that he does not use drugs.    Subjective:    - Seen on HD today, tolerating well  - Still has significant leg swelling      Review of Systems   Constitutional:  Positive for fatigue. Negative for

## 2025-06-16 NOTE — CARE COORDINATION
06/16/25 1410   IMM Letter   IMM Letter given to Patient/Family/Significant other/Guardian/POA/by: IMM Given   IMM Letter date given: 06/16/25   IMM Letter time given: 1410

## 2025-06-16 NOTE — PROGRESS NOTES
Nutrition Note    RECOMMENDATIONS  PO Diet:Modify diet to double portions of protein.     ASSESSMENT   Nutrition assessment for LOS. Initiation HD on 6/13 for EZRA on CKD4. Pt is on a regular low fiber diet. Upon visiting, pt is out room for dialysis. Wife reported pt has a good appetite, 100% meal intake at most times. Wt hx review inidicates a trend of wt loss likely dialysis/ fluid related.  Recommend pt to make sure to have adequate protein intake at meals and snacks d/t protein loss from dialysis.  Wife is understanding.  Will cont to follow up.       Malnutrition Status: No malnutrition  Acute Illness  Findings of the 6 clinical characteristics of malnutrition:  Energy Intake:  No decrease in energy intake  Weight Loss:  Unable to assess (New dialysis on 6/13. Wt loss is fluid related)     Body Fat Loss:  Unable to assess     Muscle Mass Loss:  Unable to assess    Fluid Accumulation:  Moderate to Severe     Strength:  Not Performed      NUTRITION DIAGNOSIS   Increased nutrient needs related to increase demand for energy/nutrients as evidenced by dialysis    Goals: Maintain adequate nutrition status, by next RD assessment     NUTRITION RELATED FINDINGS  Objective: BUE: nonpitting edema, BLE: +2 edema. Lyte are WNL, gluco:115. LBM 6/12  Wounds: None    CURRENT NUTRITION THERAPIES  ADULT DIET; Regular; Low Fiber       PO Intake: %   PO Supplement Intake:None Ordered      ANTHROPOMETRICS  Current Height: 177.8 cm (5' 10\")  Current Weight - Scale: 103 kg (227 lb 1.2 oz)      COMPARATIVE STANDARDS  Total Energy Requirements (kcals/day): 4366-1471     Protein (g):         Fluid (mL/day):  3764-9946    EDUCATION  Education/Counseling completed (High protein food intake when on dialysis)     The patient will be monitored per nutrition standards of care. Consult dietitian if additional nutrition interventions are needed prior to RD reassessment.     Karl Gonzalez RD    Contact: [phone]

## 2025-06-16 NOTE — TELEPHONE ENCOUNTER
Requested Prescriptions     Pending Prescriptions Disp Refills    hydrALAZINE (APRESOLINE) 100 MG tablet [Pharmacy Med Name: HYDRALAZINE 100 MG TABLET] 180 tablet 1     Sig: TAKE 1 TABLET BY MOUTH EVERY DAY AT NOON AND 5 PM      Last OV:  4/7/2025 [facility]    Next OV: 7/3/2025 [facility]    Last Labs: 06/08/2025 CMP    Last Filled: 02/17/2025 [facility]

## 2025-06-16 NOTE — PLAN OF CARE
Problem: Chronic Conditions and Co-morbidities  Goal: Patient's chronic conditions and co-morbidity symptoms are monitored and maintained or improved  6/16/2025 0447 by Delores Monzon RN  Outcome: Progressing  6/15/2025 1550 by Maria G Jolley RN  Outcome: Progressing     Problem: ABCDS Injury Assessment  Goal: Absence of physical injury  6/16/2025 0447 by Delores Monzon RN  Outcome: Progressing  6/15/2025 1550 by Maria G Jolley RN  Outcome: Progressing     Problem: Skin/Tissue Integrity  Goal: Skin integrity remains intact  Description: 1.  Monitor for areas of redness and/or skin breakdown2.  Assess vascular access sites hourly3.  Every 4-6 hours minimum:  Change oxygen saturation probe site4.  Every 4-6 hours:  If on nasal continuous positive airway pressure, respiratory therapy assess nares and determine need for appliance change or resting period  6/16/2025 0447 by Delores Monzon RN  Outcome: Progressing  6/15/2025 1550 by Maria G Jolley RN  Outcome: Progressing     Problem: Safety - Adult  Goal: Free from fall injury  6/16/2025 0447 by Delores Monzon RN  Outcome: Progressing  6/15/2025 1550 by Maria G Jolley RN  Outcome: Progressing     Problem: Discharge Planning  Goal: Discharge to home or other facility with appropriate resources  6/16/2025 0447 by Delores Monzon RN  Outcome: Progressing  6/15/2025 1550 by Maria G Jolley RN  Outcome: Progressing     Problem: Pain  Goal: Verbalizes/displays adequate comfort level or baseline comfort level  6/16/2025 0447 by Delores Monzon RN  Outcome: Progressing  6/15/2025 1550 by Maria G Jolley RN  Outcome: Progressing     Problem: Metabolic/Fluid and Electrolytes - Adult  Goal: Hemodynamic stability and optimal renal function maintained  6/16/2025 0447 by Delores Monzon RN  Outcome: Progressing  6/15/2025 1550 by Maria G Jolley RN  Outcome: Progressing     Problem: Hematologic - Adult  Goal: Maintains hematologic stability  6/16/2025 0447 by Delores Monzon RN  Outcome:

## 2025-06-16 NOTE — DISCHARGE INSTR - COC
Continuity of Care Form    Patient Name: Chester Catsano Jr.   :  1935  MRN:  [id number]    Admit date:  2025  Discharge date:  2025    Code Status Order: Full Code   Advance Directives:     Admitting Physician:  Cecilio Bustillos MD  PCP: Micah Sauceda MD    Discharging Nurse: Alisia MELLO lpn  Discharging Hospital Unit/Room#: 5TN-5564/5564-01  Discharging Unit Phone Number: ***    Emergency Contact:   Extended Emergency Contact Information  Primary Emergency Contact: Serena Castano  Address: [address] [place] [id number] [place]  Home Phone: [phone]  Work Phone: [phone]  Relation: Spouse    Past Surgical History:  Past Surgical History:   Procedure Laterality Date    APPENDECTOMY      CARDIAC SURGERY      3 V    CARPAL TUNNEL RELEASE      BILAT    COLONOSCOPY N/A 2025    COLONOSCOPY POLYPECTOMY SNARE/BIOPSY performed by Tremayne Quintero MD at [facility] ENDOSCOPY    COLONOSCOPY  2025    COLONOSCOPY SUBMUCOSAL/BOTOX INJECTION performed by Tremayne Quintero MD at [facility] ENDOSCOPY    CORONARY ANGIOPLASTY WITH STENT PLACEMENT          IR TUNNELED CVC PLACE WO SQ PORT/PUMP > 5 YEARS  2025    IR TUNNELED CVC PLACE WO SQ PORT/PUMP > 5 YEARS 2025 Parker Plunkett MD [facility] SPECIAL PROCEDURES    KNEE ARTHROSCOPY      RT    MOHS SURGERY      on his face    PACEMAKER PLACEMENT  10/04/2018    dual chamber    PROSTATE SURGERY      TONSILLECTOMY      TURP      TURP  2010    CYSTO INTERNAL URETHROTOMY       Immunization History:   Immunization History   Administered Date(s) Administered    COVID-19, PFIZER, , (age 12y+), IM, 30mcg/0.3mL 2024    Hep A, HAVRIX, VAQTA, (age 19y+), IM, 1mL 2018    Influenza Virus Vaccine 10/22/2010, 10/03/2018    Influenza, FLUZONE High Dose, (age 65 y+), IM, Trivalent PF, 0.5mL 2017    Pneumococcal, PCV-13, PREVNAR 13, (age 6w+), IM, 0.5mL 03/15/2018    Pneumococcal, PPSV23,

## 2025-06-17 ENCOUNTER — APPOINTMENT (OUTPATIENT)
Dept: CT IMAGING | Age: 89
End: 2025-06-17
Attending: STUDENT IN AN ORGANIZED HEALTH CARE EDUCATION/TRAINING PROGRAM
Payer: MEDICARE

## 2025-06-17 LAB
ALBUMIN SERPL-MCNC: 3.3 G/DL (ref 3.4–5)
ANION GAP SERPL CALCULATED.3IONS-SCNC: 10 MMOL/L (ref 3–16)
BUN SERPL-MCNC: 22 MG/DL (ref 7–20)
CALCIUM SERPL-MCNC: 8.2 MG/DL (ref 8.3–10.6)
CHLORIDE SERPL-SCNC: 104 MMOL/L (ref 99–110)
CO2 SERPL-SCNC: 26 MMOL/L (ref 21–32)
CREAT SERPL-MCNC: 2.3 MG/DL (ref 0.8–1.3)
DEPRECATED RDW RBC AUTO: 17.1 % (ref 12.4–15.4)
GFR SERPLBLD CREATININE-BSD FMLA CKD-EPI: 26 ML/MIN/{1.73_M2}
GLUCOSE SERPL-MCNC: 115 MG/DL (ref 70–99)
HCT VFR BLD AUTO: 23.3 % (ref 40.5–52.5)
HCT VFR BLD AUTO: 25.9 % (ref 40.5–52.5)
HGB BLD-MCNC: 7.8 G/DL (ref 13.5–17.5)
HGB BLD-MCNC: 8.7 G/DL (ref 13.5–17.5)
MCH RBC QN AUTO: 30.2 PG (ref 26–34)
MCHC RBC AUTO-ENTMCNC: 33.6 G/DL (ref 31–36)
MCV RBC AUTO: 89.8 FL (ref 80–100)
PHOSPHATE SERPL-MCNC: 2.9 MG/DL (ref 2.5–4.9)
PLATELET # BLD AUTO: 111 K/UL (ref 135–450)
PMV BLD AUTO: 9.1 FL (ref 5–10.5)
POTASSIUM SERPL-SCNC: 3.9 MMOL/L (ref 3.5–5.1)
RBC # BLD AUTO: 2.88 M/UL (ref 4.2–5.9)
SODIUM SERPL-SCNC: 140 MMOL/L (ref 136–145)
WBC # BLD AUTO: 6.3 K/UL (ref 4–11)

## 2025-06-17 PROCEDURE — 85027 COMPLETE CBC AUTOMATED: CPT

## 2025-06-17 PROCEDURE — 6360000002 HC RX W HCPCS: Performed by: NURSE PRACTITIONER

## 2025-06-17 PROCEDURE — 85018 HEMOGLOBIN: CPT

## 2025-06-17 PROCEDURE — 1200000000 HC SEMI PRIVATE

## 2025-06-17 PROCEDURE — 36415 COLL VENOUS BLD VENIPUNCTURE: CPT

## 2025-06-17 PROCEDURE — 80069 RENAL FUNCTION PANEL: CPT

## 2025-06-17 PROCEDURE — 74174 CTA ABD&PLVS W/CONTRAST: CPT

## 2025-06-17 PROCEDURE — 6360000004 HC RX CONTRAST MEDICATION: Performed by: INTERNAL MEDICINE

## 2025-06-17 PROCEDURE — 2500000003 HC RX 250 WO HCPCS: Performed by: INTERNAL MEDICINE

## 2025-06-17 PROCEDURE — 97530 THERAPEUTIC ACTIVITIES: CPT

## 2025-06-17 PROCEDURE — 2500000003 HC RX 250 WO HCPCS: Performed by: STUDENT IN AN ORGANIZED HEALTH CARE EDUCATION/TRAINING PROGRAM

## 2025-06-17 PROCEDURE — 6360000002 HC RX W HCPCS: Performed by: STUDENT IN AN ORGANIZED HEALTH CARE EDUCATION/TRAINING PROGRAM

## 2025-06-17 PROCEDURE — 6370000000 HC RX 637 (ALT 250 FOR IP): Performed by: STUDENT IN AN ORGANIZED HEALTH CARE EDUCATION/TRAINING PROGRAM

## 2025-06-17 PROCEDURE — 36592 COLLECT BLOOD FROM PICC: CPT

## 2025-06-17 PROCEDURE — 90935 HEMODIALYSIS ONE EVALUATION: CPT

## 2025-06-17 PROCEDURE — 94761 N-INVAS EAR/PLS OXIMETRY MLT: CPT

## 2025-06-17 PROCEDURE — 85014 HEMATOCRIT: CPT

## 2025-06-17 RX ORDER — IOPAMIDOL 755 MG/ML
75 INJECTION, SOLUTION INTRAVASCULAR
Status: COMPLETED | OUTPATIENT
Start: 2025-06-17 | End: 2025-06-17

## 2025-06-17 RX ADMIN — SODIUM CHLORIDE, PRESERVATIVE FREE 10 ML: 5 INJECTION INTRAVENOUS at 07:52

## 2025-06-17 RX ADMIN — ATORVASTATIN CALCIUM 20 MG: 20 TABLET, FILM COATED ORAL at 07:51

## 2025-06-17 RX ADMIN — CARVEDILOL 25 MG: 25 TABLET, FILM COATED ORAL at 18:16

## 2025-06-17 RX ADMIN — PANTOPRAZOLE SODIUM 40 MG: 40 INJECTION, POWDER, FOR SOLUTION INTRAVENOUS at 21:57

## 2025-06-17 RX ADMIN — TAMSULOSIN HYDROCHLORIDE 0.4 MG: 0.4 CAPSULE ORAL at 07:51

## 2025-06-17 RX ADMIN — HEPARIN SODIUM 3200 UNITS: 1000 INJECTION INTRAVENOUS; SUBCUTANEOUS at 21:30

## 2025-06-17 RX ADMIN — HYDRALAZINE HYDROCHLORIDE 5 MG: 20 INJECTION INTRAMUSCULAR; INTRAVENOUS at 05:29

## 2025-06-17 RX ADMIN — SODIUM CHLORIDE, PRESERVATIVE FREE 10 ML: 5 INJECTION INTRAVENOUS at 21:57

## 2025-06-17 RX ADMIN — HYDRALAZINE HYDROCHLORIDE 5 MG: 20 INJECTION INTRAMUSCULAR; INTRAVENOUS at 12:24

## 2025-06-17 RX ADMIN — SODIUM CHLORIDE, PRESERVATIVE FREE 10 ML: 5 INJECTION INTRAVENOUS at 21:59

## 2025-06-17 RX ADMIN — SODIUM CHLORIDE, PRESERVATIVE FREE 10 ML: 5 INJECTION INTRAVENOUS at 07:51

## 2025-06-17 RX ADMIN — PANTOPRAZOLE SODIUM 40 MG: 40 INJECTION, POWDER, FOR SOLUTION INTRAVENOUS at 07:51

## 2025-06-17 RX ADMIN — IOPAMIDOL 75 ML: 755 INJECTION, SOLUTION INTRAVENOUS at 16:12

## 2025-06-17 RX ADMIN — CARVEDILOL 25 MG: 25 TABLET, FILM COATED ORAL at 07:51

## 2025-06-17 ASSESSMENT — PAIN DESCRIPTION - DESCRIPTORS
DESCRIPTORS: ACHING

## 2025-06-17 ASSESSMENT — ENCOUNTER SYMPTOMS
FACIAL SWELLING: 0
ABDOMINAL DISTENTION: 0
PHOTOPHOBIA: 0
SHORTNESS OF BREATH: 0
BLOOD IN STOOL: 0
DIARRHEA: 0
VOMITING: 0
EYE REDNESS: 0
NAUSEA: 0
CONSTIPATION: 0
EYE DISCHARGE: 0
ABDOMINAL PAIN: 0
CHEST TIGHTNESS: 0
COUGH: 0

## 2025-06-17 ASSESSMENT — PAIN SCALES - GENERAL
PAINLEVEL_OUTOF10: 0

## 2025-06-17 ASSESSMENT — PAIN SCALES - WONG BAKER
WONGBAKER_NUMERICALRESPONSE: NO HURT

## 2025-06-17 ASSESSMENT — PAIN DESCRIPTION - LOCATION
LOCATION: BACK

## 2025-06-17 ASSESSMENT — PAIN DESCRIPTION - ORIENTATION
ORIENTATION: MID

## 2025-06-17 ASSESSMENT — PAIN - FUNCTIONAL ASSESSMENT
PAIN_FUNCTIONAL_ASSESSMENT: ACTIVITIES ARE NOT PREVENTED

## 2025-06-17 NOTE — PLAN OF CARE
Problem: Chronic Conditions and Co-morbidities  Goal: Patient's chronic conditions and co-morbidity symptoms are monitored and maintained or improved  6/16/2025 2229 by Miranda Jiménez RN  Outcome: Progressing  Flowsheets (Taken 6/16/2025 2029)  Care Plan - Patient's Chronic Conditions and Co-Morbidity Symptoms are Monitored and Maintained or Improved: Monitor and assess patient's chronic conditions and comorbid symptoms for stability, deterioration, or improvement     Problem: ABCDS Injury Assessment  Goal: Absence of physical injury  6/16/2025 2229 by Miranda Jiménez RN  Outcome: Progressing     Problem: Skin/Tissue Integrity  Goal: Skin integrity remains intact  Description: 1.  Monitor for areas of redness and/or skin breakdown2.  Assess vascular access sites hourly3.  Every 4-6 hours minimum:  Change oxygen saturation probe site4.  Every 4-6 hours:  If on nasal continuous positive airway pressure, respiratory therapy assess nares and determine need for appliance change or resting period  6/16/2025 2229 by Mrianda Jiménez RN  Outcome: Progressing  Flowsheets (Taken 6/16/2025 2029)  Skin Integrity Remains Intact: Monitor for areas of redness and/or skin breakdown     Problem: Safety - Adult  Goal: Free from fall injury  6/16/2025 2229 by Miranda Jiménez RN  Outcome: Progressing     Problem: Discharge Planning  Goal: Discharge to home or other facility with appropriate resources  6/16/2025 2229 by Miranda Jiménez RN  Outcome: Progressing  Flowsheets (Taken 6/16/2025 2029)  Discharge to home or other facility with appropriate resources: Identify barriers to discharge with patient and caregiver     Problem: Pain  Goal: Verbalizes/displays adequate comfort level or baseline comfort level  6/16/2025 2229 by Miranda Jiménez RN  Outcome: Progressing     Problem: Metabolic/Fluid and Electrolytes - Adult  Goal: Hemodynamic stability and optimal renal function maintained  6/16/2025 2229 by Miranda Jiménez RN  Outcome:

## 2025-06-17 NOTE — PROGRESS NOTES
Shift assessment completed. VSS. Scheduled meds administered. Pt using CPAP. No visible distress. Pt calm and A&O. Bed locked and low position. Call light within reach. No additional needs expressed at this time. POC ongoing.

## 2025-06-17 NOTE — CARE COORDINATION
Discharge Planning:     (CM) Cesar SQUIRES chair confirmed.      Cesar Thompson  [address] [id number]  Phone [phone]  Fax [phone]    Schedule:12:20pm   Chair Time: Mon/Wed/Friday    Electronically signed by Les Jeffers on 6/17/25 at 11:30 AM EDT

## 2025-06-17 NOTE — PLAN OF CARE
Problem: Chronic Conditions and Co-morbidities  Goal: Patient's chronic conditions and co-morbidity symptoms are monitored and maintained or improved  6/17/2025 1057 by Ermelinda Alford RN  Outcome: Progressing  6/16/2025 2229 by Miranda Jiménez RN  Outcome: Progressing  Flowsheets (Taken 6/16/2025 2029)  Care Plan - Patient's Chronic Conditions and Co-Morbidity Symptoms are Monitored and Maintained or Improved: Monitor and assess patient's chronic conditions and comorbid symptoms for stability, deterioration, or improvement     Problem: ABCDS Injury Assessment  Goal: Absence of physical injury  6/17/2025 1057 by Ermelinda Alford RN  Outcome: Progressing  6/16/2025 2229 by Miranda Jiménez RN  Outcome: Progressing     Problem: Skin/Tissue Integrity  Goal: Skin integrity remains intact  Description: 1.  Monitor for areas of redness and/or skin breakdown2.  Assess vascular access sites hourly3.  Every 4-6 hours minimum:  Change oxygen saturation probe site4.  Every 4-6 hours:  If on nasal continuous positive airway pressure, respiratory therapy assess nares and determine need for appliance change or resting period  6/17/2025 1057 by Ermelinda Alford RN  Outcome: Progressing  6/16/2025 2229 by Miranda Jiménez RN  Outcome: Progressing  Flowsheets (Taken 6/16/2025 2029)  Skin Integrity Remains Intact: Monitor for areas of redness and/or skin breakdown     Problem: Safety - Adult  Goal: Free from fall injury  6/17/2025 1057 by Ermelinda Alford RN  Outcome: Progressing  6/16/2025 2229 by Miranda Jiménez RN  Outcome: Progressing     Problem: Discharge Planning  Goal: Discharge to home or other facility with appropriate resources  6/17/2025 1057 by Ermelinda Alford RN  Outcome: Progressing  6/16/2025 2229 by Miranda Jiménez RN  Outcome: Progressing  Flowsheets (Taken 6/16/2025 2029)  Discharge to home or other facility with appropriate resources: Identify barriers to discharge with patient and caregiver

## 2025-06-17 NOTE — PROGRESS NOTES
Hospitalist Progress Note      PCP: Micah Sauceda MD    Date of Admission: 6/8/2025    LOS: 9    Chief Complaint: No chief complaint on file.      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  General surgery was consulted and plan for possible ileocecectomy/right hemicolectomy once recovered from acute illness.        Principal Problem:    Lower GI bleed    Acute blood loss anemia  Patient had a recent colonoscopy noted for high-grade dysplasia.  This morning he had a bloody bowel motion though with stable H&H.  This was noted on toilet bowl with no abdominal pains.  - Will reconsult GI in view of possible colonoscopy versus angiogram  - Monitor H&H and transfuse if symptomatic or hemoglobin less than 8.0      Chronic diastolic congestive heart failure (HCC): Appears euvolemic.  Remains on room air.  Stable hemodynamics.  Continue Coreg, isosorbide mononitrate.  Hydralazine on hold.      Active Problems:    Paroxysmal atrial fibrillation (HCC): Rate controlled on Coreg.  Anticoagulation on hold.    CAD (coronary artery disease): No chest pains or shortness of breath.  Continue Coreg, statin, isosorbide mononitrate    HTN (hypertension): Stable on Coreg, isosorbide    Hyperlipidemia: On statin    Type 2 diabetes mellitus with complication, without long-term current use of insulin (HCC): Sliding-scale insulin    Acute on CKD (chronic kidney disease), stage IV (HCC):  Baseline creatinine 2.0-2.7.  Patient had tunneled catheter placed and started on dialysis 6/13/2024.  Nephrology following with recommendations.     SAUD (obstructive sleep apnea)    Resolved Problems:    Hemorrhagic shock (HCC)        Medications:  Reviewed  Infusion Medications    sodium chloride

## 2025-06-17 NOTE — PROGRESS NOTES
[facility]al Note    Patient Active Problem List   Diagnosis    BPH (benign prostatic hyperplasia)    CAD (coronary artery disease)    HTN (hypertension)    OA (osteoarthritis)    Edema    Hyperlipidemia    Claudication of both lower extremities    Sleep apnea    Symptomatic bradycardia    Tachy-monroe syndrome (HCC)    Pacemaker    Asymptomatic bilateral carotid artery stenosis    Chronic diastolic congestive heart failure (HCC)    Gastroesophageal reflux disease without esophagitis    Generalized anxiety disorder    History of colonic polyps    History of coronary artery bypass graft    History of coronary artery stent placement    S/P placement of cardiac pacemaker    S/P total knee arthroplasty    Type 2 diabetes mellitus with complication, without long-term current use of insulin (HCC)    CKD (chronic kidney disease), stage IV (HCC)    Proteinuria    Obesity, unspecified obesity severity, unspecified obesity type    Diverticulitis    NSVT (nonsustained ventricular tachycardia) (HCC)    Paroxysmal atrial fibrillation (HCC)    Chronic renal impairment    Lower GI bleed    Hemorrhagic shock (HCC)    SAUD (obstructive sleep apnea)    Acute decompensated heart failure (HCC)    Acute blood loss anemia       Past Medical History:   has a past medical history of Anxiety, Arthritis, CAD (coronary artery disease), Cancer (HCC), GERD (gastroesophageal reflux disease), Hyperlipidemia, and Hypertension.    Past Social History:   reports that he quit smoking about 33 years ago. His smoking use included cigarettes. He started smoking about 63 years ago. He has a 45 pack-year smoking history. He has been exposed to tobacco smoke. He has never used smokeless tobacco. He reports current alcohol use. He reports that he does not use drugs.    Subjective:    - For UF today  - Still has significant leg swelling  - has bloody stools      Review of Systems   Constitutional:  Positive for fatigue. Negative for

## 2025-06-17 NOTE — PROGRESS NOTES
Gastroenterology Progress Note      Chester Castano Jr. is a 89 y.o. male patient.  Principal Problem:    Lower GI bleed  Active Problems:    Paroxysmal atrial fibrillation (HCC)    CAD (coronary artery disease)    HTN (hypertension)    Hyperlipidemia    Chronic diastolic congestive heart failure (HCC)    Type 2 diabetes mellitus with complication, without long-term current use of insulin (HCC)    CKD (chronic kidney disease), stage IV (HCC)    Hemorrhagic shock (HCC)    SAUD (obstructive sleep apnea)    Acute decompensated heart failure (HCC)    Acute blood loss anemia  Resolved Problems:    * No resolved hospital problems. *      SUBJECTIVE: Admitted for 4 episodes of painless BRBPR. On Eliquis. Colonoscopy 25 with large polyp around the ileocecal valve which did come back as a high-grade dysplasia. There is residual polyp which will need removed. Surgery consulted and plan for possible ileocecectomy/right hemicolectomy once recovered.   He reports blood on toilet tissue and in toilet bowel following small bowel movement today. Denies abdominal pain or rectal pain.  Hgb stable at 8.3 today.    He is supposed to be discharged today with plans for dialysis tomorrow.     ROS:  No fever, chills  No chest pain, palpitations  No SOB, cough  Gastrointestinal ROS: no abdominal pain, nausea, vomiting     Physical    VITALS:  BP (!) 173/74   Pulse 60   Temp 97.4 °F (36.3 °C) (Oral)   Resp 18   Ht 1.778 m (5' 10\")   Wt 101.4 kg (223 lb 9.6 oz)   SpO2 97%   BMI 32.08 kg/m²   TEMPERATURE:  Current - Temp: 97.4 °F (36.3 °C); Max - Temp  Av.7 °F (36.5 °C)  Min: 97.3 °F (36.3 °C)  Max: 98.3 °F (36.8 °C)    NAD  RRR, Nl s1s2  Lungs CTA Bilaterally, normal effort  Abdomen soft, ND, NT, no HSM, Bowel sounds normal.    Data    Data Review:    Recent Labs     06/15/25  0526 06/16/25  0503   WBC 5.3 4.6   HGB 7.8* 8.3*   HCT 23.5* 23.9*   MCV 90.6 89.0   * 128*     Recent Labs     06/15/25  05  hemicolectomy for removal of large ascending colon polyp (path tubulovillous adenoma with high-grade dysplasia).    Papo Zarate MD, MSc  [facility]  06/17/25

## 2025-06-17 NOTE — PROGRESS NOTES
[facility] - Inpatient Rehabilitation Department   Phone: [phone]    Physical Therapy    [] Initial Evaluation            [x] Daily Treatment Note         [] Discharge Summary      Patient: Chester Castano Jr.   : 1935   MRN: [id number]   Date of Service:  2025  Admitting Diagnosis: Lower GI bleed  Current Admission Summary: Patient admitted for bright red blood in the toilet. Painless bleeding, no pain in rectum. Required 2 units PRBC on admission. GI consulted.   Past Medical History:  has a past medical history of Anxiety, Arthritis, CAD (coronary artery disease), Cancer (HCC), GERD (gastroesophageal reflux disease), Hyperlipidemia, and Hypertension.  Past Surgical History:  has a past surgical history that includes Appendectomy; Tonsillectomy; Knee arthroscopy; TURP; Coronary angioplasty with stent; Carpal tunnel release; TURP (2010); Cardiac surgery (); Prostate surgery; Mohs surgery; pacemaker placement (10/04/2018); Colonoscopy (N/A, 2025); Colonoscopy (2025); and IR TUNNELED CVC PLACE WO SQ PORT/PUMP > 5 YEARS (2025).    Discharge Recommendations: Chester Castano Jr. scored a 18/24 on the AM-PAC short mobility form. Current research shows that an AM-PAC score of 18 or greater is typically associated with a discharge to the patient's home setting. Based on the patient's AM-PAC score and their current functional mobility deficits, it is recommended that the patient have 2-3 sessions per week of Physical Therapy at d/c to increase the patient's independence.  At this time, this patient demonstrates the endurance and safety to discharge home with HHPT and a follow up treatment frequency of 2-3x/wk.  Please see assessment section for further patient specific details.    If patient discharges prior to next session this note will serve as a discharge summary.  Please see below for the latest assessment towards goals.       HOME HEALTH CARE: LEVEL 1  limited by endurance and pt self-limiting distance. Pt continues to require CGA for mobility tasks and would benefit from continued skilled PT to address these deficits and return to PLOF without limitations.  Safety Interventions: patient left in bed, bed alarm in place, call light within reach, gait belt, nurse notified, and family/caregiver present    Plan  Frequency: 3-5 x/per week  Current Treatment Recommendations: strengthening, ROM, balance training, functional mobility training, transfer training, gait training, stair training, endurance training, and neuromuscular re-education    Goals  Patient Goals: none stated   Short Term Goals:  Time Frame: by d/c  Patient will complete bed mobility at Independent   Patient will complete transfers at Independent   Patient will ambulate 200 ft with use of no device at supervision  Patient will ascend/descend 2 stairs without use of HR at stand by assistance  NO GOALS MET THIS DATE     Above goals reviewed on 6/17/2025.  All goals are ongoing at this time unless indicated above.      Therapy Session Time      Individual Group Co-treatment   Time In 1413       Time Out 1424       Minutes 11         Timed Code Treatment Minutes:   16  Total Treatment Minutes:  31       Electronically Signed By: Laura Kothari, PT  Laura Kothari PT, DPT [id number]

## 2025-06-18 ENCOUNTER — APPOINTMENT (OUTPATIENT)
Dept: INTERVENTIONAL RADIOLOGY/VASCULAR | Age: 89
End: 2025-06-18
Attending: STUDENT IN AN ORGANIZED HEALTH CARE EDUCATION/TRAINING PROGRAM
Payer: MEDICARE

## 2025-06-18 ENCOUNTER — APPOINTMENT (OUTPATIENT)
Dept: GENERAL RADIOLOGY | Age: 89
End: 2025-06-18
Attending: STUDENT IN AN ORGANIZED HEALTH CARE EDUCATION/TRAINING PROGRAM
Payer: MEDICARE

## 2025-06-18 LAB
ABO/RH: NORMAL
ANTIBODY SCREEN: NORMAL
APPEARANCE FLUID: NORMAL
BASOPHILS # BLD: 0 K/UL (ref 0–0.2)
BASOPHILS NFR BLD: 0.7 %
BDY FLUID QUALITY: NORMAL
CELL COUNT FLUID TYPE: NORMAL
COLOR FLUID: NORMAL
DEPRECATED RDW RBC AUTO: 17 % (ref 12.4–15.4)
EOSINOPHIL # BLD: 0.1 K/UL (ref 0–0.6)
EOSINOPHIL NFR BLD: 1 %
EOSINOPHIL NFR FLD MANUAL: 1 %
GLUCOSE BLD-MCNC: 147 MG/DL (ref 70–99)
HCT VFR BLD AUTO: 22.2 % (ref 40.5–52.5)
HCT VFR BLD AUTO: 23.8 % (ref 40.5–52.5)
HGB BLD-MCNC: 7.3 G/DL (ref 13.5–17.5)
HGB BLD-MCNC: 8 G/DL (ref 13.5–17.5)
LDH FLD L TO P-CCNC: 76 U/L
LYMPHOCYTES # BLD: 0.6 K/UL (ref 1–5.1)
LYMPHOCYTES NFR BLD: 11.6 %
LYMPHOCYTES NFR FLD: 92 %
MACROPHAGES # FLD: 4 %
MCH RBC QN AUTO: 30 PG (ref 26–34)
MCHC RBC AUTO-ENTMCNC: 32.7 G/DL (ref 31–36)
MCV RBC AUTO: 91.8 FL (ref 80–100)
MONOCYTES # BLD: 0.4 K/UL (ref 0–1.3)
MONOCYTES NFR BLD: 7.9 %
NEUTROPHIL, FLUID: 3 %
NEUTROPHILS # BLD: 4.3 K/UL (ref 1.7–7.7)
NEUTROPHILS NFR BLD: 78.8 %
NUC CELL # FLD: 430 /CUMM
PERFORMED ON: ABNORMAL
PH FLD STRIP: 8 [PH]
PLATELET # BLD AUTO: 89 K/UL (ref 135–450)
PMV BLD AUTO: 9.2 FL (ref 5–10.5)
PROT FLD-MCNC: 2.7 G/DL
RBC # BLD AUTO: 2.42 M/UL (ref 4.2–5.9)
RBC FLUID: <1000 /CUMM
SPECIMEN SOURCE FLD: NORMAL
TOTAL CELLS COUNTED FLD: 100
WBC # BLD AUTO: 5.4 K/UL (ref 4–11)

## 2025-06-18 PROCEDURE — 83615 LACTATE (LD) (LDH) ENZYME: CPT

## 2025-06-18 PROCEDURE — 87070 CULTURE OTHR SPECIMN AEROBIC: CPT

## 2025-06-18 PROCEDURE — 88112 CYTOPATH CELL ENHANCE TECH: CPT

## 2025-06-18 PROCEDURE — 71045 X-RAY EXAM CHEST 1 VIEW: CPT

## 2025-06-18 PROCEDURE — 6370000000 HC RX 637 (ALT 250 FOR IP): Performed by: STUDENT IN AN ORGANIZED HEALTH CARE EDUCATION/TRAINING PROGRAM

## 2025-06-18 PROCEDURE — 6370000000 HC RX 637 (ALT 250 FOR IP): Performed by: INTERNAL MEDICINE

## 2025-06-18 PROCEDURE — 6360000002 HC RX W HCPCS: Performed by: STUDENT IN AN ORGANIZED HEALTH CARE EDUCATION/TRAINING PROGRAM

## 2025-06-18 PROCEDURE — 32555 ASPIRATE PLEURA W/ IMAGING: CPT

## 2025-06-18 PROCEDURE — 2500000003 HC RX 250 WO HCPCS: Performed by: STUDENT IN AN ORGANIZED HEALTH CARE EDUCATION/TRAINING PROGRAM

## 2025-06-18 PROCEDURE — 85018 HEMOGLOBIN: CPT

## 2025-06-18 PROCEDURE — 90935 HEMODIALYSIS ONE EVALUATION: CPT

## 2025-06-18 PROCEDURE — 86900 BLOOD TYPING SEROLOGIC ABO: CPT

## 2025-06-18 PROCEDURE — 6360000002 HC RX W HCPCS: Performed by: INTERNAL MEDICINE

## 2025-06-18 PROCEDURE — 99222 1ST HOSP IP/OBS MODERATE 55: CPT | Performed by: SURGERY

## 2025-06-18 PROCEDURE — 88305 TISSUE EXAM BY PATHOLOGIST: CPT

## 2025-06-18 PROCEDURE — 1200000000 HC SEMI PRIVATE

## 2025-06-18 PROCEDURE — 89051 BODY FLUID CELL COUNT: CPT

## 2025-06-18 PROCEDURE — 87015 SPECIMEN INFECT AGNT CONCNTJ: CPT

## 2025-06-18 PROCEDURE — 0W9B3ZZ DRAINAGE OF LEFT PLEURAL CAVITY, PERCUTANEOUS APPROACH: ICD-10-PCS | Performed by: INTERNAL MEDICINE

## 2025-06-18 PROCEDURE — 84157 ASSAY OF PROTEIN OTHER: CPT

## 2025-06-18 PROCEDURE — 85025 COMPLETE CBC W/AUTO DIFF WBC: CPT

## 2025-06-18 PROCEDURE — 85014 HEMATOCRIT: CPT

## 2025-06-18 PROCEDURE — 2500000003 HC RX 250 WO HCPCS: Performed by: INTERNAL MEDICINE

## 2025-06-18 PROCEDURE — C1729 CATH, DRAINAGE: HCPCS

## 2025-06-18 PROCEDURE — 87205 SMEAR GRAM STAIN: CPT

## 2025-06-18 PROCEDURE — 86901 BLOOD TYPING SEROLOGIC RH(D): CPT

## 2025-06-18 PROCEDURE — 83986 ASSAY PH BODY FLUID NOS: CPT

## 2025-06-18 PROCEDURE — 86850 RBC ANTIBODY SCREEN: CPT

## 2025-06-18 RX ORDER — HYDROCORTISONE ACETATE 25 MG/1
25 SUPPOSITORY RECTAL 2 TIMES DAILY
Status: DISCONTINUED | OUTPATIENT
Start: 2025-06-18 | End: 2025-06-19 | Stop reason: HOSPADM

## 2025-06-18 RX ORDER — LIDOCAINE HYDROCHLORIDE 10 MG/ML
10 INJECTION, SOLUTION EPIDURAL; INFILTRATION; INTRACAUDAL; PERINEURAL ONCE
Status: COMPLETED | OUTPATIENT
Start: 2025-06-18 | End: 2025-06-18

## 2025-06-18 RX ORDER — CARVEDILOL 6.25 MG/1
6.25 TABLET ORAL 2 TIMES DAILY WITH MEALS
Status: DISCONTINUED | OUTPATIENT
Start: 2025-06-18 | End: 2025-06-19 | Stop reason: HOSPADM

## 2025-06-18 RX ADMIN — SODIUM CHLORIDE, PRESERVATIVE FREE 10 ML: 5 INJECTION INTRAVENOUS at 20:29

## 2025-06-18 RX ADMIN — PANTOPRAZOLE SODIUM 40 MG: 40 INJECTION, POWDER, FOR SOLUTION INTRAVENOUS at 10:28

## 2025-06-18 RX ADMIN — HYDROCORTISONE ACETATE 25 MG: 25 SUPPOSITORY RECTAL at 20:33

## 2025-06-18 RX ADMIN — SODIUM CHLORIDE, PRESERVATIVE FREE 10 ML: 5 INJECTION INTRAVENOUS at 10:28

## 2025-06-18 RX ADMIN — CARVEDILOL 25 MG: 25 TABLET, FILM COATED ORAL at 10:36

## 2025-06-18 RX ADMIN — HEPARIN SODIUM 3200 UNITS: 1000 INJECTION INTRAVENOUS; SUBCUTANEOUS at 14:30

## 2025-06-18 RX ADMIN — LIDOCAINE HYDROCHLORIDE 10 ML: 10 INJECTION, SOLUTION EPIDURAL; INFILTRATION; INTRACAUDAL; PERINEURAL at 10:14

## 2025-06-18 RX ADMIN — PANTOPRAZOLE SODIUM 40 MG: 40 INJECTION, POWDER, FOR SOLUTION INTRAVENOUS at 20:30

## 2025-06-18 RX ADMIN — TAMSULOSIN HYDROCHLORIDE 0.4 MG: 0.4 CAPSULE ORAL at 10:28

## 2025-06-18 RX ADMIN — EPOETIN ALFA-EPBX 10000 UNITS: 10000 INJECTION, SOLUTION INTRAVENOUS; SUBCUTANEOUS at 17:00

## 2025-06-18 RX ADMIN — ATORVASTATIN CALCIUM 20 MG: 20 TABLET, FILM COATED ORAL at 10:28

## 2025-06-18 RX ADMIN — HYDROCORTISONE ACETATE 25 MG: 25 SUPPOSITORY RECTAL at 10:43

## 2025-06-18 RX ADMIN — CARVEDILOL 6.25 MG: 6.25 TABLET, FILM COATED ORAL at 17:08

## 2025-06-18 RX ADMIN — SODIUM CHLORIDE, PRESERVATIVE FREE 10 ML: 5 INJECTION INTRAVENOUS at 09:00

## 2025-06-18 ASSESSMENT — ENCOUNTER SYMPTOMS
ABDOMINAL DISTENTION: 0
CONSTIPATION: 0
EYE DISCHARGE: 0
COUGH: 0
SHORTNESS OF BREATH: 0
VOMITING: 0
NAUSEA: 0
FACIAL SWELLING: 0
DIARRHEA: 0
PHOTOPHOBIA: 0
BLOOD IN STOOL: 0
ABDOMINAL PAIN: 0
CHEST TIGHTNESS: 0
EYE REDNESS: 0

## 2025-06-18 ASSESSMENT — PAIN DESCRIPTION - ORIENTATION
ORIENTATION: MID

## 2025-06-18 ASSESSMENT — PAIN SCALES - GENERAL
PAINLEVEL_OUTOF10: 0

## 2025-06-18 ASSESSMENT — PAIN SCALES - WONG BAKER
WONGBAKER_NUMERICALRESPONSE: NO HURT

## 2025-06-18 ASSESSMENT — PAIN DESCRIPTION - DESCRIPTORS
DESCRIPTORS: ACHING

## 2025-06-18 ASSESSMENT — PAIN DESCRIPTION - LOCATION
LOCATION: BACK

## 2025-06-18 ASSESSMENT — PAIN - FUNCTIONAL ASSESSMENT
PAIN_FUNCTIONAL_ASSESSMENT: ACTIVITIES ARE NOT PREVENTED

## 2025-06-18 NOTE — PLAN OF CARE
Problem: Chronic Conditions and Co-morbidities  Goal: Patient's chronic conditions and co-morbidity symptoms are monitored and maintained or improved  6/17/2025 2128 by Miranda Jiménez RN  Outcome: Progressing     Problem: ABCDS Injury Assessment  Goal: Absence of physical injury  6/17/2025 2128 by Miranda Jiménez RN  Outcome: Progressing     Problem: Skin/Tissue Integrity  Goal: Skin integrity remains intact  Description: 1.  Monitor for areas of redness and/or skin breakdown2.  Assess vascular access sites hourly3.  Every 4-6 hours minimum:  Change oxygen saturation probe site4.  Every 4-6 hours:  If on nasal continuous positive airway pressure, respiratory therapy assess nares and determine need for appliance change or resting period  6/17/2025 2128 by Miranda Jiménez RN  Outcome: Progressing  Flowsheets (Taken 6/17/2025 1059 by Ermelinda Alford RN)  Skin Integrity Remains Intact: Monitor for areas of redness and/or skin breakdown     Problem: Safety - Adult  Goal: Free from fall injury  6/17/2025 2128 by Miranda Jiménez RN  Outcome: Progressing     Problem: Discharge Planning  Goal: Discharge to home or other facility with appropriate resources  6/17/2025 2128 by Miranda Jiménez RN  Outcome: Progressing     Problem: Pain  Goal: Verbalizes/displays adequate comfort level or baseline comfort level  6/17/2025 2128 by Miranda Jiménez RN  Outcome: Progressing  Flowsheets  Taken 6/17/2025 1800 by Ermelinda Alford RN  Verbalizes/displays adequate comfort level or baseline comfort level: Encourage patient to monitor pain and request assistance  Taken 6/17/2025 1630 by Ermelinda Alford RN  Verbalizes/displays adequate comfort level or baseline comfort level: Encourage patient to monitor pain and request assistance     Problem: Metabolic/Fluid and Electrolytes - Adult  Goal: Hemodynamic stability and optimal renal function maintained  6/17/2025 2128 by Miranda Jiménez RN  Outcome: Progressing     Problem: Hematologic -

## 2025-06-18 NOTE — CONSULTS
[facility]  Consultation Note    Subjective/Objective  Patient was off the floor at the time of rounds for examination.  Imaging reviewed, chart reviewed, recommendations as below.      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  Atherosclerotic disease is noted, however given his advanced age this is not uncommon or unsuspected.  In the absence of acute onset abdominal pain or thromboembolic disease being noted, I would not recommend intervention or further investigation regarding the ostial disease of the celiac, superior mesenteric, or renal vessels.  I would recommend initiation of antiplatelets when deemed appropriate necessary at the discretion of the treatment team, however in the light of GI bleeding this can be withheld.  I would recommend the patient follow-up with vascular surgery on an as-needed basis for his visceral disease.      James Brothers DO   6/18/2025  1:45 PM

## 2025-06-18 NOTE — PROGRESS NOTES
Treatment time: 2.5 hrs    Net UF: 2000 ml     Pre weight: 99.9 kg  Post weight: 98.0 kg     Access used: Rtdc  Access function:  tolerated well,  BFR 300ml/min     Medications or blood products given: heparin dwells     Regular outpatient schedule: TBD     Summary of response to treatment: Pt tolerated well. Pt remained stable throughout entire treatment and upon exiting the hemodialysis suite.      Copy of dialysis treatment record placed in chart, to be scanned into EMR.

## 2025-06-18 NOTE — PROGRESS NOTES
Hospitalist Progress Note      PCP: Micah Sauceda MD    Date of Admission: 6/8/2025    LOS: 10    Chief Complaint: No chief complaint on file.      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  General surgery was consulted and plan for possible ileocecectomy/right hemicolectomy once recovered from acute illness.  He had left pleural effusion status post thoracentesis 6/18/2020      Principal Problem:    Lower GI bleed    Acute blood loss anemia  Patient had a recent colonoscopy noted for high-grade dysplasia.  This morning he had a bloody bowel motion though with stable H&H.  This was noted on toilet bowl with no abdominal pains.  Status post 1 unit PRBC  CT angiogram of abdomen reviewed extravasation to suggest active bleeding  - Patient was seen by GI.  Will hold off anticoagulants  - GI following with recommendations      Chronic diastolic congestive heart failure (HCC): Appears euvolemic.  Remains on room air.  Stable hemodynamics.  Continue Coreg, isosorbide mononitrate.  Hydralazine on hold.      Left large pleural effusion: Status post thoracentesis this morning of 1 L.  Will send for fluid analysis and follow-up.      Active Problems:    Paroxysmal atrial fibrillation (HCC): Rate controlled on Coreg.  Anticoagulation on hold.    CAD (coronary artery disease): No chest pains or shortness of breath.  Continue Coreg, statin, isosorbide mononitrate    HTN (hypertension): Stable on Coreg, isosorbide    Hyperlipidemia: On statin    Type 2 diabetes mellitus with complication, without long-term current use of insulin (HCC): Sliding-scale insulin    Acute on CKD (chronic kidney disease), stage IV (HCC):  Baseline creatinine 2.0-2.7.  Patient had tunneled

## 2025-06-18 NOTE — PLAN OF CARE
Problem: Chronic Conditions and Co-morbidities  Goal: Patient's chronic conditions and co-morbidity symptoms are monitored and maintained or improved  6/17/2025 2128 by Miranda Jiménez RN  Outcome: Progressing     Problem: ABCDS Injury Assessment  Goal: Absence of physical injury  6/17/2025 2128 by Miranda Jiménez RN  Outcome: Progressing     Problem: Skin/Tissue Integrity  Goal: Skin integrity remains intact  Description: 1.  Monitor for areas of redness and/or skin breakdown2.  Assess vascular access sites hourly3.  Every 4-6 hours minimum:  Change oxygen saturation probe site4.  Every 4-6 hours:  If on nasal continuous positive airway pressure, respiratory therapy assess nares and determine need for appliance change or resting period  6/17/2025 2128 by Miranda Jiménez RN  Outcome: Progressing     Problem: Safety - Adult  Goal: Free from fall injury  6/17/2025 2128 by Miranda Jiménez RN  Outcome: Progressing     Problem: Discharge Planning  Goal: Discharge to home or other facility with appropriate resources  6/17/2025 2128 by Miranda Jiménez RN  Outcome: Progressing     Problem: Pain  Goal: Verbalizes/displays adequate comfort level or baseline comfort level  6/17/2025 2128 by Miranda Jiménez RN  Outcome: Progressing     Problem: Metabolic/Fluid and Electrolytes - Adult  Goal: Hemodynamic stability and optimal renal function maintained  6/17/2025 2128 by Miranda Jiménez RN  Outcome: Progressing     Problem: Hematologic - Adult  Goal: Maintains hematologic stability  6/17/2025 2128 by Miranda Jiménez RN  Outcome: Progressing

## 2025-06-18 NOTE — PLAN OF CARE
Problem: Chronic Conditions and Co-morbidities  Goal: Patient's chronic conditions and co-morbidity symptoms are monitored and maintained or improved  6/18/2025 0943 by Ermelinda Alford RN  Outcome: Progressing  Flowsheets (Taken 6/17/2025 2230 by Miranda Jiménez RN)  Care Plan - Patient's Chronic Conditions and Co-Morbidity Symptoms are Monitored and Maintained or Improved: Monitor and assess patient's chronic conditions and comorbid symptoms for stability, deterioration, or improvement  6/17/2025 2128 by Miranda Jiménez RN  Outcome: Progressing  6/17/2025 2128 by Miranda Jiménez RN  Outcome: Progressing     Problem: ABCDS Injury Assessment  Goal: Absence of physical injury  6/18/2025 0943 by Ermelinda Alford RN  Outcome: Progressing  6/17/2025 2128 by Miranda Jiménez RN  Outcome: Progressing  6/17/2025 2128 by Miranda Jiménez RN  Outcome: Progressing     Problem: Skin/Tissue Integrity  Goal: Skin integrity remains intact  Description: 1.  Monitor for areas of redness and/or skin breakdown2.  Assess vascular access sites hourly3.  Every 4-6 hours minimum:  Change oxygen saturation probe site4.  Every 4-6 hours:  If on nasal continuous positive airway pressure, respiratory therapy assess nares and determine need for appliance change or resting period  6/18/2025 0943 by Ermelinda Alford RN  Outcome: Progressing  6/17/2025 2128 by Miranda Jiménez RN  Outcome: Progressing  6/17/2025 2128 by Miranda Jiménez RN  Outcome: Progressing  Flowsheets (Taken 6/17/2025 1059 by Ermelinda Alford RN)  Skin Integrity Remains Intact: Monitor for areas of redness and/or skin breakdown     Problem: Safety - Adult  Goal: Free from fall injury  6/18/2025 0943 by Ermelinda Alford RN  Outcome: Progressing  6/17/2025 2128 by Miranda Jiménez RN  Outcome: Progressing  6/17/2025 2128 by Miranda Jiménez RN  Outcome: Progressing     Problem: Discharge Planning  Goal: Discharge to home or other facility with appropriate

## 2025-06-18 NOTE — FLOWSHEET NOTE
Treatment time: 2.5 hours UF only tx  Net UF: 3 L    Pre weight: 102.3 kg (standing scale)  Post weight: 99.3 kg (standing scale)  EDW: TBD/EZRA    Access used: RIJ HD tunneled cath  Access function: No problems    Medications or blood products given: None    Regular outpatient schedule: TBD/EZRA    Summary of response to treatment:     Copy of dialysis treatment record placed in chart, to be scanned into EMR.    Report called to Miranda Jiménez RN

## 2025-06-18 NOTE — PROGRESS NOTES
[facility] Note    Patient Active Problem List   Diagnosis    BPH (benign prostatic hyperplasia)    CAD (coronary artery disease)    HTN (hypertension)    OA (osteoarthritis)    Edema    Hyperlipidemia    Claudication of both lower extremities    Sleep apnea    Symptomatic bradycardia    Tachy-monroe syndrome (HCC)    Pacemaker    Asymptomatic bilateral carotid artery stenosis    Chronic diastolic congestive heart failure (HCC)    Gastroesophageal reflux disease without esophagitis    Generalized anxiety disorder    History of colonic polyps    History of coronary artery bypass graft    History of coronary artery stent placement    S/P placement of cardiac pacemaker    S/P total knee arthroplasty    Type 2 diabetes mellitus with complication, without long-term current use of insulin (HCC)    CKD (chronic kidney disease), stage IV (HCC)    Proteinuria    Obesity, unspecified obesity severity, unspecified obesity type    Diverticulitis    NSVT (nonsustained ventricular tachycardia) (HCC)    Paroxysmal atrial fibrillation (HCC)    Chronic renal impairment    Lower GI bleed    Hemorrhagic shock (HCC)    SAUD (obstructive sleep apnea)    Acute decompensated heart failure (HCC)    Acute blood loss anemia       Past Medical History:   has a past medical history of Anxiety, Arthritis, CAD (coronary artery disease), Cancer (HCC), GERD (gastroesophageal reflux disease), Hyperlipidemia, and Hypertension.    Past Social History:   reports that he quit smoking about 33 years ago. His smoking use included cigarettes. He started smoking about 63 years ago. He has a 45 pack-year smoking history. He has been exposed to tobacco smoke. He has never used smokeless tobacco. He reports current alcohol use. He reports that he does not use drugs.    Subjective:    - For HD today  - Leg swelling much improved  - CTA done yesterday, no active bleeding      Review of Systems   Constitutional:  Positive for fatigue.

## 2025-06-18 NOTE — PROGRESS NOTES
Gastroenterology Progress Note    Chester Castano Jr. is a 89 y.o. male patient.  1. Paroxysmal atrial fibrillation (HCC)    2. Bright red blood per rectum    3. Status post thoracentesis        Admission Date: 2025  Hospital Day: Hospital Day: 11  Attending: Gela Asher MD  Date of service: 25    SUBJECTIVE:    No recurrent GI bleeding     ROS:  Cardiovascular ROS: no chest pain or dyspnea on exertion  Gastrointestinal ROS: see above  Respiratory ROS: no cough, shortness of breath, or wheezing    Physical    VITALS:  BP (!) 93/54   Pulse 60   Temp 98.2 °F (36.8 °C) (Oral)   Resp 18   Ht 1.778 m (5' 10\")   Wt 99.9 kg (220 lb 3.2 oz)   SpO2 95%   BMI 31.60 kg/m²   TEMPERATURE:  Current - Temp: 98.2 °F (36.8 °C); Max - Temp  Av.9 °F (36.6 °C)  Min: 97.1 °F (36.2 °C)  Max: 98.6 °F (37 °C)    NAD  RRR, Nl s1s2  Lungs CTA Bilaterally, normal effort  Abdomen soft, ND, NT, no HSM, Bowel sounds normal  AAOx3, No asterixis     Data      CBC:   Recent Labs     25  0509 25  1554 25  2214   WBC 4.6  --  6.3   RBC 2.69*  --  2.88*   HGB 8.3* 7.8* 8.7*   HCT 23.9* 23.3* 25.9*   *  --  111*   MCV 89.0  --  89.8   MCH 30.7  --  30.2   MCHC 34.5  --  33.6   RDW 15.6*  --  17.1*        BMP:  Recent Labs     25  0509 25  0520    140   K 3.9 3.9    104   CO2 26 26   BUN 27* 22*   CREATININE 2.5* 2.3*   CALCIUM 8.2* 8.2*   GLUCOSE 115* 115*        Hepatic Function Panel:   No results for input(s): \"AST\", \"ALT\", \"BILIDIR\", \"BILITOT\", \"ALKPHOS\" in the last 72 hours.    No results for input(s): \"LIPASE\", \"AMYLASE\" in the last 72 hours.  No results for input(s): \"PROTIME\", \"INR\" in the last 72 hours.  Invalid input(s): \"PTT\"  No results for input(s): \"OCCULTBLD\" in the last 72 hours.      Radiology Review:    IR GUIDED THORACENTESIS PLEURAL   Final Result   Successful ultrasound guided left thoracentesis.         XR CHEST 1 VIEW   Final Result   1. No

## 2025-06-19 VITALS
WEIGHT: 213.4 LBS | HEART RATE: 57 BPM | DIASTOLIC BLOOD PRESSURE: 42 MMHG | OXYGEN SATURATION: 96 % | BODY MASS INDEX: 30.55 KG/M2 | RESPIRATION RATE: 18 BRPM | SYSTOLIC BLOOD PRESSURE: 113 MMHG | HEIGHT: 70 IN | TEMPERATURE: 97.7 F

## 2025-06-19 LAB
ANION GAP SERPL CALCULATED.3IONS-SCNC: 11 MMOL/L (ref 3–16)
BASOPHILS # BLD: 0.1 K/UL (ref 0–0.2)
BASOPHILS NFR BLD: 1.3 %
BUN SERPL-MCNC: 26 MG/DL (ref 7–20)
CALCIUM SERPL-MCNC: 8.3 MG/DL (ref 8.3–10.6)
CHLORIDE SERPL-SCNC: 102 MMOL/L (ref 99–110)
CO2 SERPL-SCNC: 23 MMOL/L (ref 21–32)
CREAT SERPL-MCNC: 2.8 MG/DL (ref 0.8–1.3)
DEPRECATED RDW RBC AUTO: 16.9 % (ref 12.4–15.4)
EOSINOPHIL # BLD: 0.1 K/UL (ref 0–0.6)
EOSINOPHIL NFR BLD: 1.3 %
GFR SERPLBLD CREATININE-BSD FMLA CKD-EPI: 21 ML/MIN/{1.73_M2}
GLUCOSE SERPL-MCNC: 203 MG/DL (ref 70–99)
HCT VFR BLD AUTO: 24 % (ref 40.5–52.5)
HGB BLD-MCNC: 8.1 G/DL (ref 13.5–17.5)
LYMPHOCYTES # BLD: 0.7 K/UL (ref 1–5.1)
LYMPHOCYTES NFR BLD: 13.4 %
MCH RBC QN AUTO: 30.2 PG (ref 26–34)
MCHC RBC AUTO-ENTMCNC: 33.6 G/DL (ref 31–36)
MCV RBC AUTO: 89.8 FL (ref 80–100)
MONOCYTES # BLD: 0.4 K/UL (ref 0–1.3)
MONOCYTES NFR BLD: 7.6 %
NEUTROPHILS # BLD: 4.1 K/UL (ref 1.7–7.7)
NEUTROPHILS NFR BLD: 76.4 %
PLATELET # BLD AUTO: 111 K/UL (ref 135–450)
PMV BLD AUTO: 9.5 FL (ref 5–10.5)
POTASSIUM SERPL-SCNC: 3.8 MMOL/L (ref 3.5–5.1)
RBC # BLD AUTO: 2.68 M/UL (ref 4.2–5.9)
SODIUM SERPL-SCNC: 136 MMOL/L (ref 136–145)
WBC # BLD AUTO: 5.4 K/UL (ref 4–11)

## 2025-06-19 PROCEDURE — 6360000002 HC RX W HCPCS: Performed by: NURSE PRACTITIONER

## 2025-06-19 PROCEDURE — 2500000003 HC RX 250 WO HCPCS: Performed by: INTERNAL MEDICINE

## 2025-06-19 PROCEDURE — 6370000000 HC RX 637 (ALT 250 FOR IP): Performed by: STUDENT IN AN ORGANIZED HEALTH CARE EDUCATION/TRAINING PROGRAM

## 2025-06-19 PROCEDURE — 6360000002 HC RX W HCPCS: Performed by: STUDENT IN AN ORGANIZED HEALTH CARE EDUCATION/TRAINING PROGRAM

## 2025-06-19 PROCEDURE — 80048 BASIC METABOLIC PNL TOTAL CA: CPT

## 2025-06-19 PROCEDURE — 97116 GAIT TRAINING THERAPY: CPT

## 2025-06-19 PROCEDURE — 97530 THERAPEUTIC ACTIVITIES: CPT

## 2025-06-19 PROCEDURE — 85025 COMPLETE CBC W/AUTO DIFF WBC: CPT

## 2025-06-19 PROCEDURE — 6370000000 HC RX 637 (ALT 250 FOR IP): Performed by: INTERNAL MEDICINE

## 2025-06-19 RX ORDER — CARVEDILOL 6.25 MG/1
6.25 TABLET ORAL 2 TIMES DAILY WITH MEALS
Qty: 60 TABLET | Refills: 3 | Status: SHIPPED | OUTPATIENT
Start: 2025-06-19

## 2025-06-19 RX ORDER — PANTOPRAZOLE SODIUM 40 MG/1
40 TABLET, DELAYED RELEASE ORAL
Qty: 90 TABLET | Refills: 1 | Status: SHIPPED | OUTPATIENT
Start: 2025-06-19

## 2025-06-19 RX ORDER — MIDODRINE HYDROCHLORIDE 5 MG/1
5 TABLET ORAL 3 TIMES DAILY PRN
Qty: 90 TABLET | Refills: 3 | Status: SHIPPED | OUTPATIENT
Start: 2025-06-19

## 2025-06-19 RX ORDER — HYDROCORTISONE ACETATE 25 MG/1
25 SUPPOSITORY RECTAL 2 TIMES DAILY
Qty: 17 SUPPOSITORY | Refills: 0 | Status: SHIPPED | OUTPATIENT
Start: 2025-06-19 | End: 2025-06-28

## 2025-06-19 RX ADMIN — SODIUM CHLORIDE, PRESERVATIVE FREE 10 ML: 5 INJECTION INTRAVENOUS at 10:07

## 2025-06-19 RX ADMIN — HYDRALAZINE HYDROCHLORIDE 5 MG: 20 INJECTION INTRAMUSCULAR; INTRAVENOUS at 06:18

## 2025-06-19 RX ADMIN — ATORVASTATIN CALCIUM 20 MG: 20 TABLET, FILM COATED ORAL at 10:07

## 2025-06-19 RX ADMIN — PANTOPRAZOLE SODIUM 40 MG: 40 INJECTION, POWDER, FOR SOLUTION INTRAVENOUS at 10:07

## 2025-06-19 RX ADMIN — TAMSULOSIN HYDROCHLORIDE 0.4 MG: 0.4 CAPSULE ORAL at 10:07

## 2025-06-19 RX ADMIN — HYDROCORTISONE ACETATE 25 MG: 25 SUPPOSITORY RECTAL at 10:07

## 2025-06-19 ASSESSMENT — ENCOUNTER SYMPTOMS
ABDOMINAL DISTENTION: 0
COUGH: 0
BLOOD IN STOOL: 0
DIARRHEA: 0
EYE REDNESS: 0
CHEST TIGHTNESS: 0
EYE DISCHARGE: 0
ABDOMINAL PAIN: 0
NAUSEA: 0
FACIAL SWELLING: 0
SHORTNESS OF BREATH: 0
VOMITING: 0
CONSTIPATION: 0
PHOTOPHOBIA: 0

## 2025-06-19 NOTE — PROGRESS NOTES
[facility]    Patient Active Problem List   Diagnosis    BPH (benign prostatic hyperplasia)    CAD (coronary artery disease)    HTN (hypertension)    OA (osteoarthritis)    Edema    Hyperlipidemia    Claudication of both lower extremities    Sleep apnea    Symptomatic bradycardia    Tachy-monroe syndrome (HCC)    Pacemaker    Asymptomatic bilateral carotid artery stenosis    Chronic diastolic congestive heart failure (HCC)    Gastroesophageal reflux disease without esophagitis    Generalized anxiety disorder    History of colonic polyps    History of coronary artery bypass graft    History of coronary artery stent placement    S/P placement of cardiac pacemaker    S/P total knee arthroplasty    Type 2 diabetes mellitus with complication, without long-term current use of insulin (HCC)    CKD (chronic kidney disease), stage IV (HCC)    Proteinuria    Obesity, unspecified obesity severity, unspecified obesity type    Diverticulitis    NSVT (nonsustained ventricular tachycardia) (HCC)    Paroxysmal atrial fibrillation (HCC)    Chronic renal impairment    Lower GI bleed    Hemorrhagic shock (HCC)    SAUD (obstructive sleep apnea)    Acute decompensated heart failure (HCC)    Acute blood loss anemia       Past Medical History:   has a past medical history of Anxiety, Arthritis, CAD (coronary artery disease), Cancer (HCC), GERD (gastroesophageal reflux disease), Hyperlipidemia, and Hypertension.    Past Social History:   reports that he quit smoking about 33 years ago. His smoking use included cigarettes. He started smoking about 63 years ago. He has a 45 pack-year smoking history. He has been exposed to tobacco smoke. He has never used smokeless tobacco. He reports current alcohol use. He reports that he does not use drugs.    Subjective:    - Leg swelling much improved  - CTA done 6/17/25, no active bleeding  - Planning for discharge today    Review of Systems   Constitutional:  Positive for    Component Value Date    CREATININE 2.8 (H) 06/19/2025    BUN 26 (H) 06/19/2025     06/19/2025    K 3.8 06/19/2025     06/19/2025    CO2 23 06/19/2025     Ferritin 152    CTA  Impression:        1. No extravasation of contrast in the stomach or bowel to suggest active GI  bleeding.  2. Gastric antral wall thickening which could be due to nondistention or  gastritis.  Mucosal thickening appears increased in this area, so gastritis  is favored.  3. Colonic diverticulosis with no evidence of diverticulitis.  4. Advanced atherosclerosis.  There is 60-70% stenosis of the celiac artery  origin, with 50% stenosis of the superior mesenteric artery origin and 50-60%  stenosis of the right common iliac artery.  5. Large left and small right pleural effusions with adjacent consolidation  in the lower lobes and lingula which could represent atelectasis or pneumonia.  6. Cholelithiasis.  7. Left adrenal adenoma measuring 2.2 cm which is slightly larger measuring 2  cm previously.  No follow-up imaging is recommended.  8. Bilateral renal cortical thinning with small bilateral simple renal cysts.       Assessment/Plan:  1.  EZRA on CKD 4-Baseline creatinine has been variable, around 2.4-3.  Hypervolemic.  With worsening edema and poor solute clearance, had long discussion with patient about initiation of HD.  He is interested in PD as future modality.  First HD 6/13.  Outpatient HD MWF at [facility] 12:20pm.  Volume status much improved.  2.  History of nephrotic range proteinuria-hold off on RAAS blockade.   3.  History of diabetes mellitus type 2  4.  History of hypertension-BP currently lower.  Decreased Coreg to 6.25 mg twice daily with holding parameters.  Agree with holding off on hydralazine and isosorbide to allow fluid removal.  5.  Anemia-given IV iron.  Continue SIENA.  6.  Hypocalcemia-better.  Phosp WNL  .  7.  Adrenal adenoma, left-BP controlled.  Hold off on hormonal assay for now.  8.

## 2025-06-19 NOTE — CARE COORDINATION
Case Management -  Discharge Note      Patient Name: Chester Castano Jr.                   YOB: 1935  Room: [id number]/5564-            Readmission Risk (Low < 19, Mod (19-27), High > 27): Readmission Risk Score: 20.8    Current PCP: Micah Sauceda MD      (IMM) Important Message from Medicare:    Has pt received appropriate compliance notices before being discharged if required: yes  Compliance doc:  [] 2nd IMM; [] Code 44 [] [place]  Date Given: 6/19/25 Given By: Tomasz GROSS    PT AM-PAC: 18 /24  OT AM-PAC: 19 /24    Patient/patient representative has been educated on the benefits of HHC as well as the possible risks of declining recommended services. Patient/patient representative has acknowledged the information provided and decided on the following discharge plan. Patient/ patient representative has been provided freedom of choice regarding service provider, supported by basic dialogue that supports the patient's individualized plan of care/goals.       [facility] agency notified of discharge:  [] Yes [] No  [x] NA    Family notified of discharge:  [x] Yes  [] No  [] NA    Facility notified of discharge:  [x] Yes  [] No  [] NA    Pt is being discharged with Outpt IV Antibiotics  [] Yes [x] No  [] NA  If yes, make sure MICHAEL is faxed to [facility] agency, and meds are called in to pharmacy by RN from MICHAEL orders only.      Financial    Payor: [place] [facility] [place]ARE / Plan: [place] [facility] [place]ARE / Product Type: *No Product type* /     Pharmacy:  Potential assistance Purchasing Medications: No  Meds-to-Beds request: Yes      [facility]/pharmacy #[id number] - [place], [place] - [address] - P [phone] - F [phone]  [address]  [place] [address]  Phone: [phone] Fax: [phone]    [facility] [id number] - [place], [place] - [address] - P [phone] - F [phone]  [address]  [facility] [id number]  Phone: [phone] Fax: [phone]    Remi Thompson  [address] [place] [address]

## 2025-06-19 NOTE — PLAN OF CARE
Problem: Chronic Conditions and Co-morbidities  Goal: Patient's chronic conditions and co-morbidity symptoms are monitored and maintained or improved  6/19/2025 1315 by Cande Menezes RN  Outcome: Completed     Problem: ABCDS Injury Assessment  Goal: Absence of physical injury  6/19/2025 1315 by Cande Menezes RN  Outcome: Completed     Problem: Skin/Tissue Integrity  Goal: Skin integrity remains intact  Description: 1.  Monitor for areas of redness and/or skin breakdown2.  Assess vascular access sites hourly3.  Every 4-6 hours minimum:  Change oxygen saturation probe site4.  Every 4-6 hours:  If on nasal continuous positive airway pressure, respiratory therapy assess nares and determine need for appliance change or resting period  6/19/2025 1315 by Cande Menezes RN  Outcome: Completed     Problem: Safety - Adult  Goal: Free from fall injury  6/19/2025 1315 by Cande Menezes RN  Outcome: Completed     Problem: Discharge Planning  Goal: Discharge to home or other facility with appropriate resources  6/19/2025 1315 by Cande Menezes RN  Outcome: Completed     Problem: Pain  Goal: Verbalizes/displays adequate comfort level or baseline comfort level  6/19/2025 1315 by Cande Menezes RN  Outcome: Completed     Problem: Metabolic/Fluid and Electrolytes - Adult  Goal: Hemodynamic stability and optimal renal function maintained  6/19/2025 1315 by Cande Menezes RN  Outcome: Completed     Problem: Hematologic - Adult  Goal: Maintains hematologic stability  6/19/2025 1315 by Cande Menezes RN  Outcome: Completed

## 2025-06-19 NOTE — PLAN OF CARE
Problem: Chronic Conditions and Co-morbidities  Goal: Patient's chronic conditions and co-morbidity symptoms are monitored and maintained or improved  Outcome: Progressing  Flowsheets  Taken 6/18/2025 2027 by Miranda Jiménez RN  Care Plan - Patient's Chronic Conditions and Co-Morbidity Symptoms are Monitored and Maintained or Improved: Monitor and assess patient's chronic conditions and comorbid symptoms for stability, deterioration, or improvement  Taken 6/18/2025 1528 by Ermelinda Alford RN  Care Plan - Patient's Chronic Conditions and Co-Morbidity Symptoms are Monitored and Maintained or Improved: Monitor and assess patient's chronic conditions and comorbid symptoms for stability, deterioration, or improvement     Problem: Skin/Tissue Integrity  Goal: Skin integrity remains intact  Description: 1.  Monitor for areas of redness and/or skin breakdown2.  Assess vascular access sites hourly3.  Every 4-6 hours minimum:  Change oxygen saturation probe site4.  Every 4-6 hours:  If on nasal continuous positive airway pressure, respiratory therapy assess nares and determine need for appliance change or resting period  Outcome: Progressing  Flowsheets (Taken 6/18/2025 1528 by Ermelinda Alford RN)  Skin Integrity Remains Intact: Monitor for areas of redness and/or skin breakdown     Problem: Safety - Adult  Goal: Free from fall injury  Outcome: Progressing     Problem: Discharge Planning  Goal: Discharge to home or other facility with appropriate resources  Outcome: Progressing  Flowsheets  Taken 6/18/2025 2027 by Miranda Jiménez RN  Discharge to home or other facility with appropriate resources: Identify barriers to discharge with patient and caregiver  Taken 6/18/2025 1528 by Ermelinda Alford RN  Discharge to home or other facility with appropriate resources: Identify barriers to discharge with patient and caregiver     Problem: Pain  Goal: Verbalizes/displays adequate comfort level or baseline comfort

## 2025-06-19 NOTE — CARE COORDINATION
06/19/25 1336   IMM Letter   IMM Letter given to Patient/Family/Significant other/Guardian/POA/by: Given to patient/family by Lisette GROSS   IMM Letter date given: 06/19/25   IMM Letter time given: 1145     Electronically signed by Les Jeffers on 6/19/25 at 1:37 PM EDT

## 2025-06-19 NOTE — PROGRESS NOTES
[facility] - Inpatient Rehabilitation Department   Phone: [phone]    Physical Therapy    [] Initial Evaluation            [x] Daily Treatment Note         [] Discharge Summary      Patient: Chester Castano Jr.   : 1935   MRN: [id number]   Date of Service:  2025  Admitting Diagnosis: Lower GI bleed  Current Admission Summary: Patient admitted for bright red blood in the toilet. Painless bleeding, no pain in rectum. Required 2 units PRBC on admission. GI consulted.   Past Medical History:  has a past medical history of Anxiety, Arthritis, CAD (coronary artery disease), Cancer (HCC), GERD (gastroesophageal reflux disease), Hyperlipidemia, and Hypertension.  Past Surgical History:  has a past surgical history that includes Appendectomy; Tonsillectomy; Knee arthroscopy; TURP; Coronary angioplasty with stent; Carpal tunnel release; TURP (2010); Cardiac surgery (); Prostate surgery; Mohs surgery; pacemaker placement (10/04/2018); Colonoscopy (N/A, 2025); Colonoscopy (2025); and IR TUNNELED CVC PLACE WO SQ PORT/PUMP > 5 YEARS (2025).    Discharge Recommendations: Chester Castano Jr. scored a 18/24 on the AM-PAC short mobility form. Current research shows that an AM-PAC score of 18 or greater is typically associated with a discharge to the patient's home setting. Based on the patient's AM-PAC score and their current functional mobility deficits, it is recommended that the patient have 2-3 sessions per week of Physical Therapy at d/c to increase the patient's independence.  At this time, this patient demonstrates the endurance and safety to discharge home with HHPT and a follow up treatment frequency of 2-3x/wk.  Please see assessment section for further patient specific details.    If patient discharges prior to next session this note will serve as a discharge summary.  Please see below for the latest assessment towards goals.       HOME HEALTH CARE: LEVEL 1

## 2025-06-19 NOTE — PROGRESS NOTES
CLINICAL PHARMACY NOTE: MEDS TO BEDS    Total # of Prescriptions Filled: 2   The following medications were delivered to the patient:  Carvedilol 6.25m  Pantoprazole 40mg    Additional Documentation:     RX for hydrocortisone 25mg suppository was transferred to [facility] in [facility] [place]. per patient's wife request because Outpatient [facility] did not have it in stock.    Medications were picked up in the Outpatient Pharmacy by patient's wife Serena Umañanifer Hernan Bravo

## 2025-06-19 NOTE — PLAN OF CARE
Problem: Chronic Conditions and Co-morbidities  Goal: Patient's chronic conditions and co-morbidity symptoms are monitored and maintained or improved  6/19/2025 1229 by Cande Menezes RN  Outcome: Progressing     Problem: ABCDS Injury Assessment  Goal: Absence of physical injury  6/19/2025 1229 by Cande Menezes, RN  Outcome: Progressing     Problem: Skin/Tissue Integrity  Goal: Skin integrity remains intact  Description: 1.  Monitor for areas of redness and/or skin breakdown2.  Assess vascular access sites hourly3.  Every 4-6 hours minimum:  Change oxygen saturation probe site4.  Every 4-6 hours:  If on nasal continuous positive airway pressure, respiratory therapy assess nares and determine need for appliance change or resting period  6/19/2025 1229 by Cande Menezes RN  Outcome: Progressing     Problem: Safety - Adult  Goal: Free from fall injury  6/19/2025 1229 by Cande Menezes RN  Outcome: Progressing     Problem: Discharge Planning  Goal: Discharge to home or other facility with appropriate resources  6/19/2025 1229 by Cande Menezes RN  Outcome: Progressing     Problem: Pain  Goal: Verbalizes/displays adequate comfort level or baseline comfort level  6/19/2025 1229 by Cande Menezes RN  Outcome: Progressing     Problem: Metabolic/Fluid and Electrolytes - Adult  Goal: Hemodynamic stability and optimal renal function maintained  6/19/2025 1229 by Cande Menezes RN  Outcome: Progressing     Problem: Hematologic - Adult  Goal: Maintains hematologic stability  6/19/2025 1229 by Cande Menezes, RN  Outcome: Progressing

## 2025-06-19 NOTE — DISCHARGE SUMMARY
Hospital Medicine Discharge Summary    Patient ID: Chester Castano Jr.      Patient's PCP: Micah Sauceda MD    Admit Date: 6/8/2025     Discharge Date: 6/19/2025      Admitting Provider: Cecilio Bustillos MD     Discharge Provider: Gela Asher MD     Discharge Diagnoses:       Active Hospital Problems    Diagnosis     Paroxysmal atrial fibrillation (HCC) [I48.0]      Priority: Medium    Acute blood loss anemia [D62]     Acute decompensated heart failure (HCC) [I50.9]     SAUD (obstructive sleep apnea) [G47.33]     Lower GI bleed [K92.2]     Hemorrhagic shock (HCC) [R57.8]     CKD (chronic kidney disease), stage IV (HCC) [N18.4]     Chronic diastolic congestive heart failure (HCC) [I50.32]     Type 2 diabetes mellitus with complication, without long-term current use of insulin (HCC) [E11.8]     Hyperlipidemia [E78.5]     HTN (hypertension) [I10]     CAD (coronary artery disease) [I25.10]        The patient was seen and examined on day of discharge and this discharge summary is in conjunction with any daily progress note from day of discharge.    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  He was complicated by hemorrhagic shock which has since improved with blood transfusion.  General surgery was consulted and plan for possible ileocecectomy/right hemicolectomy once recovered from acute illness.  He had left pleural effusion status post thoracentesis 6/18/2020        Principal Problem:    Lower GI bleed with Acute blood loss anemia and hemorrhagic shock  Patient had a recent colonoscopy noted for high-grade dysplasia.  H&H remained stable.  Received PRBC. CT angiogram of abdomen with no evidence of active bleeding.  Patient

## 2025-06-21 LAB
BACTERIA FLD AEROBE CULT: NORMAL
GRAM STN SPEC: NORMAL

## 2025-07-03 ENCOUNTER — OFFICE VISIT (OUTPATIENT)
Dept: CARDIOLOGY CLINIC | Age: 89
End: 2025-07-03
Payer: MEDICARE

## 2025-07-03 VITALS
WEIGHT: 213.5 LBS | HEART RATE: 57 BPM | DIASTOLIC BLOOD PRESSURE: 80 MMHG | HEIGHT: 70 IN | SYSTOLIC BLOOD PRESSURE: 136 MMHG | BODY MASS INDEX: 30.56 KG/M2 | OXYGEN SATURATION: 97 %

## 2025-07-03 DIAGNOSIS — I48.0 PAROXYSMAL ATRIAL FIBRILLATION (HCC): ICD-10-CM

## 2025-07-03 DIAGNOSIS — E78.2 MIXED HYPERLIPIDEMIA: ICD-10-CM

## 2025-07-03 DIAGNOSIS — G47.33 OSA (OBSTRUCTIVE SLEEP APNEA): ICD-10-CM

## 2025-07-03 DIAGNOSIS — I25.10 CORONARY ARTERY DISEASE INVOLVING NATIVE CORONARY ARTERY OF NATIVE HEART WITHOUT ANGINA PECTORIS: ICD-10-CM

## 2025-07-03 DIAGNOSIS — I47.29 NSVT (NONSUSTAINED VENTRICULAR TACHYCARDIA) (HCC): ICD-10-CM

## 2025-07-03 DIAGNOSIS — Z95.1 HISTORY OF CORONARY ARTERY BYPASS GRAFT: ICD-10-CM

## 2025-07-03 DIAGNOSIS — I50.32 CHRONIC DIASTOLIC CONGESTIVE HEART FAILURE (HCC): ICD-10-CM

## 2025-07-03 DIAGNOSIS — I73.9 CLAUDICATION OF BOTH LOWER EXTREMITIES: ICD-10-CM

## 2025-07-03 DIAGNOSIS — N18.4 CKD (CHRONIC KIDNEY DISEASE), STAGE IV (HCC): ICD-10-CM

## 2025-07-03 DIAGNOSIS — I10 PRIMARY HYPERTENSION: Primary | ICD-10-CM

## 2025-07-03 DIAGNOSIS — Z95.0 PACEMAKER: ICD-10-CM

## 2025-07-03 DIAGNOSIS — R60.9 EDEMA, UNSPECIFIED TYPE: ICD-10-CM

## 2025-07-03 PROCEDURE — 1123F ACP DISCUSS/DSCN MKR DOCD: CPT | Performed by: INTERNAL MEDICINE

## 2025-07-03 PROCEDURE — 1159F MED LIST DOCD IN RCRD: CPT | Performed by: INTERNAL MEDICINE

## 2025-07-03 PROCEDURE — 99214 OFFICE O/P EST MOD 30 MIN: CPT | Performed by: INTERNAL MEDICINE

## 2025-07-03 RX ORDER — AMLODIPINE BESYLATE 2.5 MG/1
TABLET ORAL
Qty: 90 TABLET | Refills: 1 | Status: SHIPPED | OUTPATIENT
Start: 2025-07-03

## 2025-07-03 RX ORDER — CARVEDILOL 6.25 MG/1
6.25 TABLET ORAL 2 TIMES DAILY WITH MEALS
Qty: 60 TABLET | Refills: 3 | Status: SHIPPED | OUTPATIENT
Start: 2025-07-03

## 2025-07-03 NOTE — PROGRESS NOTES
Called pt left voice message informing pt to return call back regarding scheduling follow up appointment with Dr Coello.  
and oriented, no motor abnormalities  PSYCH: Calm affect  SKIN: Warm and dry, No rash  HEENT: Normocephalic, Sclera non-icteric, mucus membranes moist  NECK: supple, JVP normal  CAROTID: Normal upstroke, no bruits  CARDIAC: Normal PMI, regular rate and rhythm, normal S1S2, No murmur, rub, or gallop  RESPIRATORY: Normal respiratory effort, clear to auscultation bilaterally  EXTREMITIES: 1+ left lower extremity edema  MUSCULOSKELETAL: No joint swelling or tenderness, no chest wall tenderness  GASTROINTESTINAL: normal bowel sounds, soft, non-tender, no bruit      MYOVIEW 1/2021  Normal myocardial perfusion.  Normal LV size and systolic function.  EF=67%.     ECHO 7/2024    Left Ventricle: Normal left ventricular systolic function with a visually estimated EF of 55 - 60%. Left ventricle is mildly dilated. Moderately increased wall thickness. Normal wall motion. Grade II diastolic dysfunction with increased LAP. Average E/e' ratio is 17.07.    Right Ventricle: Not well visualized. Right ventricle size is normal. Normal systolic function. TAPSE is normal. TAPSE is 1.7 cm. RV Peak S' is 6 cm/s.    Aortic Valve: Mildly calcified cusps. No regurgitation. Mild stenosis of the aortic valve. AV mean gradient is 11 mmHg. AV peak gradient is 20 mmHg. AV area by continuity VTI is 1.7 cm2.    Mitral Valve: Mild annular calcification at the posterior leaflet. Mild to moderate regurgitation.    Tricuspid Valve: Mild to moderate regurgitation. The estimated RVSP is 34 mmHg.    Pulmonic Valve: Mild regurgitation.    Right Atrium: Right atrium size is normal. Pacerwire noted.    Image quality is technically difficult.    ECHO [facility] 1/2025-Personally reviewed. LV systolic function appears normal. EF visually 55-60%. 3DEF=53%  Overall left ventricular ejection fraction is estimated to be 40-45%.   Mild global hypokinesis of the left ventricle.   Mild concentric left ventricular hypertrophy.   Mild mitral annular calcification.

## 2025-07-03 NOTE — PATIENT INSTRUCTIONS
Stay off of the Imdur    Start Norvasc 2.5 on Tuesday, Thursday, Saturday and Sunday in the afternoon    Will have Dr. Christiansen reach out to you in regard to the appointment for the Watchman Procedure      Follow up with Dr. Cool in 6 weeks

## 2025-08-07 ENCOUNTER — OFFICE VISIT (OUTPATIENT)
Dept: CARDIOLOGY CLINIC | Age: 89
End: 2025-08-07
Payer: MEDICARE

## 2025-08-07 VITALS
DIASTOLIC BLOOD PRESSURE: 60 MMHG | HEIGHT: 70 IN | SYSTOLIC BLOOD PRESSURE: 116 MMHG | HEART RATE: 70 BPM | WEIGHT: 210.5 LBS | BODY MASS INDEX: 30.14 KG/M2 | OXYGEN SATURATION: 96 %

## 2025-08-07 DIAGNOSIS — I73.9 CLAUDICATION OF BOTH LOWER EXTREMITIES: ICD-10-CM

## 2025-08-07 DIAGNOSIS — E78.2 MIXED HYPERLIPIDEMIA: ICD-10-CM

## 2025-08-07 DIAGNOSIS — Z95.0 PACEMAKER: ICD-10-CM

## 2025-08-07 DIAGNOSIS — I25.10 CORONARY ARTERY DISEASE INVOLVING NATIVE CORONARY ARTERY OF NATIVE HEART WITHOUT ANGINA PECTORIS: ICD-10-CM

## 2025-08-07 DIAGNOSIS — I47.29 NSVT (NONSUSTAINED VENTRICULAR TACHYCARDIA) (HCC): ICD-10-CM

## 2025-08-07 DIAGNOSIS — G47.33 OSA (OBSTRUCTIVE SLEEP APNEA): ICD-10-CM

## 2025-08-07 DIAGNOSIS — I48.0 PAROXYSMAL ATRIAL FIBRILLATION (HCC): ICD-10-CM

## 2025-08-07 DIAGNOSIS — N18.4 CKD (CHRONIC KIDNEY DISEASE), STAGE IV (HCC): ICD-10-CM

## 2025-08-07 DIAGNOSIS — Z95.1 HISTORY OF CORONARY ARTERY BYPASS GRAFT: ICD-10-CM

## 2025-08-07 DIAGNOSIS — I10 PRIMARY HYPERTENSION: Primary | ICD-10-CM

## 2025-08-07 DIAGNOSIS — I50.32 CHRONIC DIASTOLIC CONGESTIVE HEART FAILURE (HCC): ICD-10-CM

## 2025-08-07 PROCEDURE — 99214 OFFICE O/P EST MOD 30 MIN: CPT | Performed by: INTERNAL MEDICINE

## 2025-08-07 PROCEDURE — 1123F ACP DISCUSS/DSCN MKR DOCD: CPT | Performed by: INTERNAL MEDICINE

## 2025-08-07 PROCEDURE — 1159F MED LIST DOCD IN RCRD: CPT | Performed by: INTERNAL MEDICINE

## 2025-08-08 ENCOUNTER — TELEPHONE (OUTPATIENT)
Dept: CARDIOLOGY CLINIC | Age: 89
End: 2025-08-08

## 2025-08-14 ENCOUNTER — HOSPITAL ENCOUNTER (INPATIENT)
Age: 89
LOS: 2 days | Discharge: HOME | End: 2025-08-16
Attending: INTERNAL MEDICINE | Admitting: HOSPITALIST
Payer: MEDICARE

## 2025-08-14 DIAGNOSIS — I65.23 ASYMPTOMATIC BILATERAL CAROTID ARTERY STENOSIS: Primary | ICD-10-CM

## 2025-08-14 PROBLEM — N18.6 ESRD (END STAGE RENAL DISEASE) (HCC): Status: ACTIVE | Noted: 2025-08-14

## 2025-08-14 PROBLEM — I65.29 STENOSIS OF CAROTID ARTERY, UNSPECIFIED LATERALITY: Status: ACTIVE | Noted: 2025-08-14

## 2025-08-14 PROBLEM — R29.90 STROKE-LIKE SYMPTOMS: Status: ACTIVE | Noted: 2025-08-14

## 2025-08-14 PROCEDURE — 2060000000 HC ICU INTERMEDIATE R&B

## 2025-08-14 PROCEDURE — 2500000003 HC RX 250 WO HCPCS: Performed by: INTERNAL MEDICINE

## 2025-08-14 PROCEDURE — 6370000000 HC RX 637 (ALT 250 FOR IP)

## 2025-08-14 PROCEDURE — 6370000000 HC RX 637 (ALT 250 FOR IP): Performed by: INTERNAL MEDICINE

## 2025-08-14 RX ORDER — SODIUM CHLORIDE 0.9 % (FLUSH) 0.9 %
5-40 SYRINGE (ML) INJECTION PRN
Status: DISCONTINUED | OUTPATIENT
Start: 2025-08-14 | End: 2025-08-16 | Stop reason: HOSPADM

## 2025-08-14 RX ORDER — LABETALOL HYDROCHLORIDE 5 MG/ML
10 INJECTION, SOLUTION INTRAVENOUS EVERY 10 MIN PRN
Status: DISCONTINUED | OUTPATIENT
Start: 2025-08-14 | End: 2025-08-16 | Stop reason: HOSPADM

## 2025-08-14 RX ORDER — POLYETHYLENE GLYCOL 3350 17 G/17G
17 POWDER, FOR SOLUTION ORAL DAILY PRN
Status: DISCONTINUED | OUTPATIENT
Start: 2025-08-14 | End: 2025-08-16 | Stop reason: HOSPADM

## 2025-08-14 RX ORDER — ONDANSETRON 4 MG/1
4 TABLET, ORALLY DISINTEGRATING ORAL EVERY 8 HOURS PRN
Status: DISCONTINUED | OUTPATIENT
Start: 2025-08-14 | End: 2025-08-16 | Stop reason: HOSPADM

## 2025-08-14 RX ORDER — CARVEDILOL 6.25 MG/1
12.5 TABLET ORAL 2 TIMES DAILY WITH MEALS
Status: DISCONTINUED | OUTPATIENT
Start: 2025-08-14 | End: 2025-08-16 | Stop reason: HOSPADM

## 2025-08-14 RX ORDER — MULTIVITAMIN WITH IRON
1 TABLET ORAL DAILY
Status: DISCONTINUED | OUTPATIENT
Start: 2025-08-15 | End: 2025-08-16 | Stop reason: HOSPADM

## 2025-08-14 RX ORDER — DIMENHYDRINATE 50 MG
200 TABLET ORAL DAILY
Status: DISCONTINUED | OUTPATIENT
Start: 2025-08-14 | End: 2025-08-14 | Stop reason: RX

## 2025-08-14 RX ORDER — ALPRAZOLAM 0.25 MG
0.25 TABLET ORAL DAILY PRN
Status: DISCONTINUED | OUTPATIENT
Start: 2025-08-14 | End: 2025-08-16 | Stop reason: HOSPADM

## 2025-08-14 RX ORDER — FAMOTIDINE 20 MG/1
20 TABLET, FILM COATED ORAL DAILY
Status: DISCONTINUED | OUTPATIENT
Start: 2025-08-15 | End: 2025-08-16 | Stop reason: HOSPADM

## 2025-08-14 RX ORDER — AMLODIPINE BESYLATE 5 MG/1
2.5 TABLET ORAL DAILY
Status: DISCONTINUED | OUTPATIENT
Start: 2025-08-14 | End: 2025-08-14

## 2025-08-14 RX ORDER — ENOXAPARIN SODIUM 100 MG/ML
40 INJECTION SUBCUTANEOUS DAILY
Status: DISCONTINUED | OUTPATIENT
Start: 2025-08-15 | End: 2025-08-16 | Stop reason: HOSPADM

## 2025-08-14 RX ORDER — CARVEDILOL 6.25 MG/1
6.25 TABLET ORAL 2 TIMES DAILY WITH MEALS
Status: DISCONTINUED | OUTPATIENT
Start: 2025-08-14 | End: 2025-08-14

## 2025-08-14 RX ORDER — SODIUM CHLORIDE 0.9 % (FLUSH) 0.9 %
5-40 SYRINGE (ML) INJECTION EVERY 12 HOURS SCHEDULED
Status: DISCONTINUED | OUTPATIENT
Start: 2025-08-14 | End: 2025-08-16 | Stop reason: HOSPADM

## 2025-08-14 RX ORDER — ASPIRIN 81 MG/1
81 TABLET ORAL DAILY
Status: DISCONTINUED | OUTPATIENT
Start: 2025-08-14 | End: 2025-08-16 | Stop reason: HOSPADM

## 2025-08-14 RX ORDER — ONDANSETRON 2 MG/ML
4 INJECTION INTRAMUSCULAR; INTRAVENOUS EVERY 6 HOURS PRN
Status: DISCONTINUED | OUTPATIENT
Start: 2025-08-14 | End: 2025-08-16 | Stop reason: HOSPADM

## 2025-08-14 RX ORDER — MIDODRINE HYDROCHLORIDE 5 MG/1
5 TABLET ORAL 3 TIMES DAILY PRN
Status: DISCONTINUED | OUTPATIENT
Start: 2025-08-14 | End: 2025-08-16 | Stop reason: HOSPADM

## 2025-08-14 RX ORDER — SODIUM CHLORIDE 9 MG/ML
INJECTION, SOLUTION INTRAVENOUS PRN
Status: DISCONTINUED | OUTPATIENT
Start: 2025-08-14 | End: 2025-08-16 | Stop reason: HOSPADM

## 2025-08-14 RX ORDER — ATORVASTATIN CALCIUM 40 MG/1
40 TABLET, FILM COATED ORAL NIGHTLY
Status: DISCONTINUED | OUTPATIENT
Start: 2025-08-14 | End: 2025-08-16 | Stop reason: HOSPADM

## 2025-08-14 RX ORDER — LACTOBACILLUS RHAMNOSUS GG 10B CELL
1 CAPSULE ORAL
Status: DISCONTINUED | OUTPATIENT
Start: 2025-08-15 | End: 2025-08-16 | Stop reason: HOSPADM

## 2025-08-14 RX ORDER — AMLODIPINE BESYLATE 5 MG/1
5 TABLET ORAL DAILY
Status: DISCONTINUED | OUTPATIENT
Start: 2025-08-14 | End: 2025-08-14

## 2025-08-14 RX ADMIN — ASPIRIN 81 MG: 81 TABLET, COATED ORAL at 21:41

## 2025-08-14 RX ADMIN — MELATONIN TAB 3 MG 3 MG: 3 TAB at 21:33

## 2025-08-14 RX ADMIN — CARVEDILOL 12.5 MG: 6.25 TABLET, FILM COATED ORAL at 21:33

## 2025-08-14 RX ADMIN — Medication 10 ML: at 21:35

## 2025-08-14 RX ADMIN — ATORVASTATIN CALCIUM 40 MG: 40 TABLET, FILM COATED ORAL at 21:33

## 2025-08-14 ASSESSMENT — PAIN SCALES - GENERAL: PAINLEVEL_OUTOF10: 0

## 2025-08-15 ENCOUNTER — APPOINTMENT (OUTPATIENT)
Dept: GENERAL RADIOLOGY | Age: 89
End: 2025-08-15
Attending: INTERNAL MEDICINE
Payer: MEDICARE

## 2025-08-15 ENCOUNTER — APPOINTMENT (OUTPATIENT)
Dept: VASCULAR LAB | Age: 89
End: 2025-08-15
Attending: INTERNAL MEDICINE
Payer: MEDICARE

## 2025-08-15 DIAGNOSIS — Z01.810 PRE-OPERATIVE CARDIOVASCULAR EXAMINATION: Primary | ICD-10-CM

## 2025-08-15 PROBLEM — Z95.1 S/P CABG (CORONARY ARTERY BYPASS GRAFT): Status: ACTIVE | Noted: 2025-08-15

## 2025-08-15 PROBLEM — I25.10 CAD IN NATIVE ARTERY: Status: ACTIVE | Noted: 2025-08-15

## 2025-08-15 PROBLEM — G45.1 TIA INVOLVING LEFT INTERNAL CAROTID ARTERY: Status: ACTIVE | Noted: 2025-08-15

## 2025-08-15 LAB
ALBUMIN SERPL-MCNC: 3.5 G/DL (ref 3.4–5)
ALBUMIN/GLOB SERPL: 1.8 {RATIO} (ref 1.1–2.2)
ALP SERPL-CCNC: 77 U/L (ref 40–129)
ALT SERPL-CCNC: 11 U/L (ref 10–40)
ANION GAP SERPL CALCULATED.3IONS-SCNC: 9 MMOL/L (ref 3–16)
AST SERPL-CCNC: 19 U/L (ref 15–37)
BILIRUB SERPL-MCNC: 0.4 MG/DL (ref 0–1)
BUN SERPL-MCNC: 28 MG/DL (ref 7–20)
CALCIUM SERPL-MCNC: 8.9 MG/DL (ref 8.3–10.6)
CHLORIDE SERPL-SCNC: 100 MMOL/L (ref 99–110)
CHOLEST SERPL-MCNC: 92 MG/DL (ref 0–199)
CO2 SERPL-SCNC: 28 MMOL/L (ref 21–32)
CREAT SERPL-MCNC: 2.7 MG/DL (ref 0.8–1.3)
DEPRECATED RDW RBC AUTO: 16.6 % (ref 12.4–15.4)
ECHO BSA: 2.18 M2
EKG ATRIAL RATE: 60 BPM
EKG DIAGNOSIS: NORMAL
EKG P AXIS: 16 DEGREES
EKG P-R INTERVAL: 180 MS
EKG Q-T INTERVAL: 478 MS
EKG QRS DURATION: 126 MS
EKG QTC CALCULATION (BAZETT): 478 MS
EKG R AXIS: 62 DEGREES
EKG T AXIS: 113 DEGREES
EKG VENTRICULAR RATE: 60 BPM
EST. AVERAGE GLUCOSE BLD GHB EST-MCNC: 99.7 MG/DL
GFR SERPLBLD CREATININE-BSD FMLA CKD-EPI: 22 ML/MIN/{1.73_M2}
GLUCOSE SERPL-MCNC: 103 MG/DL (ref 70–99)
HBA1C MFR BLD: 5.1 %
HCT VFR BLD AUTO: 31.7 % (ref 40.5–52.5)
HDLC SERPL-MCNC: 32 MG/DL (ref 40–60)
HGB BLD-MCNC: 10.8 G/DL (ref 13.5–17.5)
LDLC SERPL CALC-MCNC: 37 MG/DL
MAGNESIUM SERPL-MCNC: 2.12 MG/DL (ref 1.8–2.4)
MCH RBC QN AUTO: 31.5 PG (ref 26–34)
MCHC RBC AUTO-ENTMCNC: 34 G/DL (ref 31–36)
MCV RBC AUTO: 92.8 FL (ref 80–100)
NT-PROBNP SERPL-MCNC: ABNORMAL PG/ML (ref 0–449)
PHOSPHATE SERPL-MCNC: 3.5 MG/DL (ref 2.5–4.9)
PLATELET # BLD AUTO: 138 K/UL (ref 135–450)
PMV BLD AUTO: 8.7 FL (ref 5–10.5)
POTASSIUM SERPL-SCNC: 4.3 MMOL/L (ref 3.5–5.1)
PROT SERPL-MCNC: 5.5 G/DL (ref 6.4–8.2)
RBC # BLD AUTO: 3.42 M/UL (ref 4.2–5.9)
SODIUM SERPL-SCNC: 137 MMOL/L (ref 136–145)
TRIGL SERPL-MCNC: 115 MG/DL (ref 0–150)
TROPONIN, HIGH SENSITIVITY: 63 NG/L (ref 0–22)
VAS LEFT CCA DIST EDV: 10.4 CM/S
VAS LEFT CCA DIST PSV: 64.2 CM/S
VAS LEFT CCA MID EDV: 13.3 CM/S
VAS LEFT CCA MID PSV: 61.1 CM/S
VAS LEFT CCA PROX EDV: 9.6 CM/S
VAS LEFT CCA PROX PSV: 105 CM/S
VAS LEFT ECA PSV: 101 CM/S
VAS LEFT ICA DIST EDV: 11 CM/S
VAS LEFT ICA DIST PSV: 53.8 CM/S
VAS LEFT ICA MID EDV: 16.5 CM/S
VAS LEFT ICA MID PSV: 87.8 CM/S
VAS LEFT ICA PROX EDV: 14.9 CM/S
VAS LEFT ICA PROX PSV: 127 CM/S
VAS LEFT ICA/CCA PSV: 1.98
VAS LEFT SUBCLAVIAN PROX PSV: 95.6 CM/S
VAS LEFT VERTEBRAL EDV: 8.78 CM/S
VAS LEFT VERTEBRAL PSV: 39 CM/S
VAS RIGHT CCA DIST EDV: 7.7 CM/S
VAS RIGHT CCA DIST PSV: 82.9 CM/S
VAS RIGHT CCA MID EDV: 7.68 CM/S
VAS RIGHT CCA MID PSV: 69.1 CM/S
VAS RIGHT CCA PROX EDV: 5.5 CM/S
VAS RIGHT CCA PROX PSV: 67.5 CM/S
VAS RIGHT ECA PSV: 72.4 CM/S
VAS RIGHT ICA DIST EDV: 8.5 CM/S
VAS RIGHT ICA DIST PSV: 47.2 CM/S
VAS RIGHT ICA MID EDV: 16.5 CM/S
VAS RIGHT ICA MID PSV: 67.5 CM/S
VAS RIGHT ICA PROX EDV: 17.6 CM/S
VAS RIGHT ICA PROX PSV: 115 CM/S
VAS RIGHT ICA/CCA PSV: 1.39
VAS RIGHT SUBCLAVIAN PROX PSV: 87 CM/S
VAS RIGHT VERTEBRAL EDV: 4.28 CM/S
VAS RIGHT VERTEBRAL PSV: 25.9 CM/S
VLDLC SERPL CALC-MCNC: 23 MG/DL
WBC # BLD AUTO: 5.6 K/UL (ref 4–11)

## 2025-08-15 PROCEDURE — 80053 COMPREHEN METABOLIC PANEL: CPT

## 2025-08-15 PROCEDURE — 84484 ASSAY OF TROPONIN QUANT: CPT

## 2025-08-15 PROCEDURE — 83036 HEMOGLOBIN GLYCOSYLATED A1C: CPT

## 2025-08-15 PROCEDURE — 85027 COMPLETE CBC AUTOMATED: CPT

## 2025-08-15 PROCEDURE — 84100 ASSAY OF PHOSPHORUS: CPT

## 2025-08-15 PROCEDURE — 93880 EXTRACRANIAL BILAT STUDY: CPT

## 2025-08-15 PROCEDURE — 5A1D70Z PERFORMANCE OF URINARY FILTRATION, INTERMITTENT, LESS THAN 6 HOURS PER DAY: ICD-10-PCS | Performed by: STUDENT IN AN ORGANIZED HEALTH CARE EDUCATION/TRAINING PROGRAM

## 2025-08-15 PROCEDURE — 6370000000 HC RX 637 (ALT 250 FOR IP): Performed by: NURSE PRACTITIONER

## 2025-08-15 PROCEDURE — 83880 ASSAY OF NATRIURETIC PEPTIDE: CPT

## 2025-08-15 PROCEDURE — 99223 1ST HOSP IP/OBS HIGH 75: CPT | Performed by: INTERNAL MEDICINE

## 2025-08-15 PROCEDURE — 97165 OT EVAL LOW COMPLEX 30 MIN: CPT

## 2025-08-15 PROCEDURE — 6370000000 HC RX 637 (ALT 250 FOR IP): Performed by: STUDENT IN AN ORGANIZED HEALTH CARE EDUCATION/TRAINING PROGRAM

## 2025-08-15 PROCEDURE — 97116 GAIT TRAINING THERAPY: CPT

## 2025-08-15 PROCEDURE — 92610 EVALUATE SWALLOWING FUNCTION: CPT

## 2025-08-15 PROCEDURE — 36415 COLL VENOUS BLD VENIPUNCTURE: CPT

## 2025-08-15 PROCEDURE — 83735 ASSAY OF MAGNESIUM: CPT

## 2025-08-15 PROCEDURE — 94760 N-INVAS EAR/PLS OXIMETRY 1: CPT

## 2025-08-15 PROCEDURE — 71045 X-RAY EXAM CHEST 1 VIEW: CPT

## 2025-08-15 PROCEDURE — 93010 ELECTROCARDIOGRAM REPORT: CPT | Performed by: INTERNAL MEDICINE

## 2025-08-15 PROCEDURE — 97161 PT EVAL LOW COMPLEX 20 MIN: CPT

## 2025-08-15 PROCEDURE — 80061 LIPID PANEL: CPT

## 2025-08-15 PROCEDURE — 99221 1ST HOSP IP/OBS SF/LOW 40: CPT | Performed by: NURSE PRACTITIONER

## 2025-08-15 PROCEDURE — 6370000000 HC RX 637 (ALT 250 FOR IP): Performed by: INTERNAL MEDICINE

## 2025-08-15 PROCEDURE — 2060000000 HC ICU INTERMEDIATE R&B

## 2025-08-15 PROCEDURE — 71046 X-RAY EXAM CHEST 2 VIEWS: CPT

## 2025-08-15 PROCEDURE — 2500000003 HC RX 250 WO HCPCS: Performed by: INTERNAL MEDICINE

## 2025-08-15 PROCEDURE — 97530 THERAPEUTIC ACTIVITIES: CPT

## 2025-08-15 PROCEDURE — 97535 SELF CARE MNGMENT TRAINING: CPT

## 2025-08-15 PROCEDURE — 93005 ELECTROCARDIOGRAM TRACING: CPT | Performed by: INTERNAL MEDICINE

## 2025-08-15 PROCEDURE — 99223 1ST HOSP IP/OBS HIGH 75: CPT | Performed by: PSYCHIATRY & NEUROLOGY

## 2025-08-15 PROCEDURE — 6370000000 HC RX 637 (ALT 250 FOR IP)

## 2025-08-15 RX ORDER — AMLODIPINE BESYLATE 5 MG/1
2.5 TABLET ORAL DAILY
Status: DISCONTINUED | OUTPATIENT
Start: 2025-08-15 | End: 2025-08-16 | Stop reason: HOSPADM

## 2025-08-15 RX ORDER — ACETAMINOPHEN 325 MG/1
650 TABLET ORAL EVERY 4 HOURS PRN
Status: DISCONTINUED | OUTPATIENT
Start: 2025-08-15 | End: 2025-08-16 | Stop reason: HOSPADM

## 2025-08-15 RX ORDER — CLOPIDOGREL BISULFATE 75 MG/1
75 TABLET ORAL DAILY
Status: DISCONTINUED | OUTPATIENT
Start: 2025-08-15 | End: 2025-08-16 | Stop reason: HOSPADM

## 2025-08-15 RX ADMIN — THERA TABS 1 TABLET: TAB at 08:34

## 2025-08-15 RX ADMIN — CARVEDILOL 12.5 MG: 6.25 TABLET, FILM COATED ORAL at 16:18

## 2025-08-15 RX ADMIN — ACETAMINOPHEN 650 MG: 325 TABLET ORAL at 22:59

## 2025-08-15 RX ADMIN — FAMOTIDINE 20 MG: 20 TABLET, FILM COATED ORAL at 08:34

## 2025-08-15 RX ADMIN — Medication 10 ML: at 10:47

## 2025-08-15 RX ADMIN — ASPIRIN 81 MG: 81 TABLET, COATED ORAL at 08:34

## 2025-08-15 RX ADMIN — Medication 1 CAPSULE: at 08:34

## 2025-08-15 RX ADMIN — CARVEDILOL 12.5 MG: 6.25 TABLET, FILM COATED ORAL at 08:34

## 2025-08-15 RX ADMIN — CLOPIDOGREL BISULFATE 75 MG: 75 TABLET, FILM COATED ORAL at 12:18

## 2025-08-15 RX ADMIN — AMLODIPINE BESYLATE 2.5 MG: 5 TABLET ORAL at 18:50

## 2025-08-15 RX ADMIN — ALPRAZOLAM 0.25 MG: 0.25 TABLET ORAL at 22:56

## 2025-08-15 RX ADMIN — Medication 10 ML: at 21:58

## 2025-08-15 RX ADMIN — ATORVASTATIN CALCIUM 40 MG: 40 TABLET, FILM COATED ORAL at 21:58

## 2025-08-15 ASSESSMENT — PAIN DESCRIPTION - DESCRIPTORS
DESCRIPTORS: ACHING
DESCRIPTORS: ACHING

## 2025-08-15 ASSESSMENT — PAIN SCALES - GENERAL
PAINLEVEL_OUTOF10: 0
PAINLEVEL_OUTOF10: 0
PAINLEVEL_OUTOF10: 2
PAINLEVEL_OUTOF10: 2
PAINLEVEL_OUTOF10: 0
PAINLEVEL_OUTOF10: 2
PAINLEVEL_OUTOF10: 0

## 2025-08-15 ASSESSMENT — PAIN DESCRIPTION - ONSET: ONSET: ON-GOING

## 2025-08-15 ASSESSMENT — PAIN DESCRIPTION - PAIN TYPE: TYPE: ACUTE PAIN

## 2025-08-15 ASSESSMENT — PAIN - FUNCTIONAL ASSESSMENT: PAIN_FUNCTIONAL_ASSESSMENT: 0-10

## 2025-08-15 ASSESSMENT — PAIN DESCRIPTION - LOCATION
LOCATION: HEAD
LOCATION: HEAD

## 2025-08-15 ASSESSMENT — PAIN DESCRIPTION - FREQUENCY: FREQUENCY: CONTINUOUS

## 2025-08-15 ASSESSMENT — PAIN DESCRIPTION - ORIENTATION
ORIENTATION: MID;ANTERIOR
ORIENTATION: ANTERIOR;MID

## 2025-08-16 ENCOUNTER — NURSE ONLY (OUTPATIENT)
Dept: CARDIOLOGY CLINIC | Age: 89
End: 2025-08-16

## 2025-08-16 ENCOUNTER — APPOINTMENT (OUTPATIENT)
Dept: MRI IMAGING | Age: 89
End: 2025-08-16
Attending: INTERNAL MEDICINE
Payer: MEDICARE

## 2025-08-16 VITALS
HEART RATE: 68 BPM | WEIGHT: 204.37 LBS | RESPIRATION RATE: 20 BRPM | HEIGHT: 70 IN | TEMPERATURE: 97.5 F | OXYGEN SATURATION: 98 % | SYSTOLIC BLOOD PRESSURE: 160 MMHG | DIASTOLIC BLOOD PRESSURE: 77 MMHG | BODY MASS INDEX: 29.26 KG/M2

## 2025-08-16 DIAGNOSIS — Z95.0 PACEMAKER: Primary | ICD-10-CM

## 2025-08-16 DIAGNOSIS — I51.9 LV DYSFUNCTION: ICD-10-CM

## 2025-08-16 DIAGNOSIS — I48.0 PAROXYSMAL ATRIAL FIBRILLATION (HCC): ICD-10-CM

## 2025-08-16 DIAGNOSIS — R00.1 SYMPTOMATIC BRADYCARDIA: ICD-10-CM

## 2025-08-16 DIAGNOSIS — I49.5 TACHY-BRADY SYNDROME (HCC): ICD-10-CM

## 2025-08-16 PROBLEM — R79.89 ELEVATED TROPONIN: Status: ACTIVE | Noted: 2025-08-16

## 2025-08-16 LAB
ALBUMIN SERPL-MCNC: 3.7 G/DL (ref 3.4–5)
ANION GAP SERPL CALCULATED.3IONS-SCNC: 9 MMOL/L (ref 3–16)
BASOPHILS # BLD: 0 K/UL (ref 0–0.2)
BASOPHILS NFR BLD: 0.7 %
BUN SERPL-MCNC: 33 MG/DL (ref 7–20)
CALCIUM SERPL-MCNC: 9.1 MG/DL (ref 8.3–10.6)
CHLORIDE SERPL-SCNC: 102 MMOL/L (ref 99–110)
CO2 SERPL-SCNC: 27 MMOL/L (ref 21–32)
CREAT SERPL-MCNC: 2.9 MG/DL (ref 0.8–1.3)
DEPRECATED RDW RBC AUTO: 16.4 % (ref 12.4–15.4)
EOSINOPHIL # BLD: 0.2 K/UL (ref 0–0.6)
EOSINOPHIL NFR BLD: 3.3 %
GFR SERPLBLD CREATININE-BSD FMLA CKD-EPI: 20 ML/MIN/{1.73_M2}
GLUCOSE SERPL-MCNC: 117 MG/DL (ref 70–99)
HBV SURFACE AB SERPL IA-ACNC: 3.73 MIU/ML
HBV SURFACE AG SERPL QL IA: NORMAL
HCT VFR BLD AUTO: 35.2 % (ref 40.5–52.5)
HGB BLD-MCNC: 11.8 G/DL (ref 13.5–17.5)
LYMPHOCYTES # BLD: 0.9 K/UL (ref 1–5.1)
LYMPHOCYTES NFR BLD: 18.1 %
MCH RBC QN AUTO: 30.9 PG (ref 26–34)
MCHC RBC AUTO-ENTMCNC: 33.5 G/DL (ref 31–36)
MCV RBC AUTO: 92.1 FL (ref 80–100)
MONOCYTES # BLD: 0.5 K/UL (ref 0–1.3)
MONOCYTES NFR BLD: 8.7 %
NEUTROPHILS # BLD: 3.6 K/UL (ref 1.7–7.7)
NEUTROPHILS NFR BLD: 69.2 %
PHOSPHATE SERPL-MCNC: 3.5 MG/DL (ref 2.5–4.9)
PLATELET # BLD AUTO: 147 K/UL (ref 135–450)
PMV BLD AUTO: 8.7 FL (ref 5–10.5)
POTASSIUM SERPL-SCNC: 4.6 MMOL/L (ref 3.5–5.1)
RBC # BLD AUTO: 3.83 M/UL (ref 4.2–5.9)
SODIUM SERPL-SCNC: 138 MMOL/L (ref 136–145)
TROPONIN, HIGH SENSITIVITY: 61 NG/L (ref 0–22)
WBC # BLD AUTO: 5.2 K/UL (ref 4–11)

## 2025-08-16 PROCEDURE — 6370000000 HC RX 637 (ALT 250 FOR IP): Performed by: NURSE PRACTITIONER

## 2025-08-16 PROCEDURE — 94760 N-INVAS EAR/PLS OXIMETRY 1: CPT

## 2025-08-16 PROCEDURE — 99223 1ST HOSP IP/OBS HIGH 75: CPT | Performed by: SURGERY

## 2025-08-16 PROCEDURE — 80069 RENAL FUNCTION PANEL: CPT

## 2025-08-16 PROCEDURE — 76018 MR SAFETY IMPLANT ELEC PREPJ: CPT

## 2025-08-16 PROCEDURE — 2500000003 HC RX 250 WO HCPCS: Performed by: INTERNAL MEDICINE

## 2025-08-16 PROCEDURE — 6370000000 HC RX 637 (ALT 250 FOR IP)

## 2025-08-16 PROCEDURE — 36415 COLL VENOUS BLD VENIPUNCTURE: CPT

## 2025-08-16 PROCEDURE — 90935 HEMODIALYSIS ONE EVALUATION: CPT

## 2025-08-16 PROCEDURE — 99233 SBSQ HOSP IP/OBS HIGH 50: CPT | Performed by: PSYCHIATRY & NEUROLOGY

## 2025-08-16 PROCEDURE — 86706 HEP B SURFACE ANTIBODY: CPT

## 2025-08-16 PROCEDURE — 85025 COMPLETE CBC W/AUTO DIFF WBC: CPT

## 2025-08-16 PROCEDURE — 6370000000 HC RX 637 (ALT 250 FOR IP): Performed by: INTERNAL MEDICINE

## 2025-08-16 PROCEDURE — 6370000000 HC RX 637 (ALT 250 FOR IP): Performed by: STUDENT IN AN ORGANIZED HEALTH CARE EDUCATION/TRAINING PROGRAM

## 2025-08-16 PROCEDURE — 84484 ASSAY OF TROPONIN QUANT: CPT

## 2025-08-16 PROCEDURE — 86704 HEP B CORE ANTIBODY TOTAL: CPT

## 2025-08-16 PROCEDURE — 87340 HEPATITIS B SURFACE AG IA: CPT

## 2025-08-16 PROCEDURE — 99232 SBSQ HOSP IP/OBS MODERATE 35: CPT | Performed by: INTERNAL MEDICINE

## 2025-08-16 RX ORDER — AMLODIPINE BESYLATE 2.5 MG/1
2.5 TABLET ORAL DAILY
Qty: 30 TABLET | Refills: 3 | Status: SHIPPED | OUTPATIENT
Start: 2025-08-17

## 2025-08-16 RX ORDER — HEPARIN SODIUM 1000 [USP'U]/ML
3200 INJECTION, SOLUTION INTRAVENOUS; SUBCUTANEOUS PRN
Status: DISCONTINUED | OUTPATIENT
Start: 2025-08-16 | End: 2025-08-16 | Stop reason: HOSPADM

## 2025-08-16 RX ORDER — CLOPIDOGREL BISULFATE 75 MG/1
75 TABLET ORAL DAILY
Qty: 30 TABLET | Refills: 1 | Status: SHIPPED | OUTPATIENT
Start: 2025-08-17

## 2025-08-16 RX ORDER — ASPIRIN 81 MG/1
81 TABLET ORAL DAILY
Qty: 30 TABLET | Refills: 3 | Status: SHIPPED | OUTPATIENT
Start: 2025-08-17

## 2025-08-16 RX ORDER — CARVEDILOL 12.5 MG/1
12.5 TABLET ORAL 2 TIMES DAILY WITH MEALS
Qty: 60 TABLET | Refills: 1 | Status: SHIPPED | OUTPATIENT
Start: 2025-08-16

## 2025-08-16 RX ADMIN — CARVEDILOL 12.5 MG: 6.25 TABLET, FILM COATED ORAL at 08:30

## 2025-08-16 RX ADMIN — FAMOTIDINE 20 MG: 20 TABLET, FILM COATED ORAL at 08:30

## 2025-08-16 RX ADMIN — ASPIRIN 81 MG: 81 TABLET, COATED ORAL at 08:30

## 2025-08-16 RX ADMIN — AMLODIPINE BESYLATE 2.5 MG: 5 TABLET ORAL at 08:30

## 2025-08-16 RX ADMIN — CLOPIDOGREL BISULFATE 75 MG: 75 TABLET, FILM COATED ORAL at 08:30

## 2025-08-16 RX ADMIN — THERA TABS 1 TABLET: TAB at 08:30

## 2025-08-16 RX ADMIN — Medication 10 ML: at 08:30

## 2025-08-16 RX ADMIN — Medication 1 CAPSULE: at 08:30

## 2025-08-16 RX ADMIN — ACETAMINOPHEN 650 MG: 325 TABLET ORAL at 08:30

## 2025-08-16 ASSESSMENT — PAIN SCALES - GENERAL
PAINLEVEL_OUTOF10: 3
PAINLEVEL_OUTOF10: 0
PAINLEVEL_OUTOF10: 1
PAINLEVEL_OUTOF10: 0
PAINLEVEL_OUTOF10: 0

## 2025-08-16 ASSESSMENT — PAIN - FUNCTIONAL ASSESSMENT: PAIN_FUNCTIONAL_ASSESSMENT: 0-10

## 2025-08-19 ENCOUNTER — TELEPHONE (OUTPATIENT)
Dept: VASCULAR SURGERY | Age: 89
End: 2025-08-19

## 2025-08-19 LAB
ECHO BSA: 2.18 M2
HBV CORE AB SERPL QL IA: NEGATIVE
VAS LEFT CCA DIST EDV: 10.4 CM/S
VAS LEFT CCA DIST PSV: 64.2 CM/S
VAS LEFT CCA MID EDV: 13.3 CM/S
VAS LEFT CCA MID PSV: 61.1 CM/S
VAS LEFT CCA PROX EDV: 9.6 CM/S
VAS LEFT CCA PROX PSV: 105 CM/S
VAS LEFT ECA PSV: 101 CM/S
VAS LEFT ICA DIST EDV: 11 CM/S
VAS LEFT ICA DIST PSV: 53.8 CM/S
VAS LEFT ICA MID EDV: 16.5 CM/S
VAS LEFT ICA MID PSV: 87.8 CM/S
VAS LEFT ICA PROX EDV: 14.9 CM/S
VAS LEFT ICA PROX PSV: 127 CM/S
VAS LEFT ICA/CCA PSV: 1.98
VAS LEFT SUBCLAVIAN PROX PSV: 95.6 CM/S
VAS LEFT VERTEBRAL EDV: 8.78 CM/S
VAS LEFT VERTEBRAL PSV: 39 CM/S
VAS RIGHT CCA DIST EDV: 7.7 CM/S
VAS RIGHT CCA DIST PSV: 82.9 CM/S
VAS RIGHT CCA MID EDV: 7.68 CM/S
VAS RIGHT CCA MID PSV: 69.1 CM/S
VAS RIGHT CCA PROX EDV: 5.5 CM/S
VAS RIGHT CCA PROX PSV: 67.5 CM/S
VAS RIGHT ECA PSV: 72.4 CM/S
VAS RIGHT ICA DIST EDV: 8.5 CM/S
VAS RIGHT ICA DIST PSV: 47.2 CM/S
VAS RIGHT ICA MID EDV: 16.5 CM/S
VAS RIGHT ICA MID PSV: 67.5 CM/S
VAS RIGHT ICA PROX EDV: 17.6 CM/S
VAS RIGHT ICA PROX PSV: 115 CM/S
VAS RIGHT ICA/CCA PSV: 1.39
VAS RIGHT SUBCLAVIAN PROX PSV: 87 CM/S
VAS RIGHT VERTEBRAL EDV: 4.28 CM/S
VAS RIGHT VERTEBRAL PSV: 25.9 CM/S

## 2025-08-19 PROCEDURE — 93880 EXTRACRANIAL BILAT STUDY: CPT | Performed by: SURGERY

## 2025-08-21 ENCOUNTER — HOSPITAL ENCOUNTER (OUTPATIENT)
Age: 89
Discharge: HOME | End: 2025-08-23
Attending: INTERNAL MEDICINE
Payer: MEDICARE

## 2025-08-21 VITALS — HEIGHT: 70 IN | WEIGHT: 204 LBS | BODY MASS INDEX: 29.2 KG/M2

## 2025-08-21 DIAGNOSIS — Z01.810 PRE-OPERATIVE CARDIOVASCULAR EXAMINATION: ICD-10-CM

## 2025-08-21 LAB
ECHO BSA: 2.14 M2
NUC STRESS EJECTION FRACTION: 29 %
NUC STRESS LV EDV: 147 ML (ref 67–155)
NUC STRESS LV ESV: 105 ML (ref 22–58)
NUC STRESS LV MASS: 185 G
STRESS BASELINE DIAS BP: 88 MMHG
STRESS BASELINE HR: 68 BPM
STRESS BASELINE SYS BP: 150 MMHG
STRESS ESTIMATED WORKLOAD: 1 METS
STRESS EXERCISE DUR MIN: 4 MIN
STRESS EXERCISE DUR SEC: 0 SEC
STRESS PEAK DIAS BP: 41 MMHG
STRESS PEAK SYS BP: 120 MMHG
STRESS PERCENT HR ACHIEVED: 54 %
STRESS POST PEAK HR: 71 BPM
STRESS RATE PRESSURE PRODUCT: 8520 BPM*MMHG
STRESS TARGET HR: 131 BPM
TID: 1.02

## 2025-08-21 PROCEDURE — 3430000000 HC RX DIAGNOSTIC RADIOPHARMACEUTICAL: Performed by: INTERNAL MEDICINE

## 2025-08-21 PROCEDURE — 93018 CV STRESS TEST I&R ONLY: CPT | Performed by: INTERNAL MEDICINE

## 2025-08-21 PROCEDURE — 6360000002 HC RX W HCPCS: Performed by: INTERNAL MEDICINE

## 2025-08-21 PROCEDURE — A9502 TC99M TETROFOSMIN: HCPCS | Performed by: INTERNAL MEDICINE

## 2025-08-21 PROCEDURE — 78452 HT MUSCLE IMAGE SPECT MULT: CPT | Performed by: INTERNAL MEDICINE

## 2025-08-21 PROCEDURE — 93016 CV STRESS TEST SUPVJ ONLY: CPT | Performed by: INTERNAL MEDICINE

## 2025-08-21 PROCEDURE — 78452 HT MUSCLE IMAGE SPECT MULT: CPT

## 2025-08-21 PROCEDURE — 93017 CV STRESS TEST TRACING ONLY: CPT

## 2025-08-21 RX ORDER — REGADENOSON 0.08 MG/ML
0.4 INJECTION, SOLUTION INTRAVENOUS
Status: COMPLETED | OUTPATIENT
Start: 2025-08-21 | End: 2025-08-21

## 2025-08-21 RX ADMIN — REGADENOSON 0.4 MG: 0.08 INJECTION, SOLUTION INTRAVENOUS at 10:52

## 2025-08-21 RX ADMIN — TETROFOSMIN 31.5 MILLICURIE: 1.38 INJECTION, POWDER, LYOPHILIZED, FOR SOLUTION INTRAVENOUS at 11:06

## 2025-08-21 RX ADMIN — TETROFOSMIN 11.2 MILLICURIE: 1.38 INJECTION, POWDER, LYOPHILIZED, FOR SOLUTION INTRAVENOUS at 10:04

## 2025-08-22 ENCOUNTER — TELEPHONE (OUTPATIENT)
Dept: CARDIOLOGY CLINIC | Age: 89
End: 2025-08-22

## 2025-08-22 ENCOUNTER — OFFICE VISIT (OUTPATIENT)
Dept: CARDIOLOGY CLINIC | Age: 89
End: 2025-08-22
Payer: MEDICARE

## 2025-08-22 VITALS
WEIGHT: 211 LBS | SYSTOLIC BLOOD PRESSURE: 148 MMHG | OXYGEN SATURATION: 62 % | BODY MASS INDEX: 30.21 KG/M2 | HEIGHT: 70 IN | DIASTOLIC BLOOD PRESSURE: 72 MMHG | HEART RATE: 62 BPM

## 2025-08-22 DIAGNOSIS — R94.39 ABNORMAL STRESS TEST: ICD-10-CM

## 2025-08-22 DIAGNOSIS — I25.10 CORONARY ARTERY DISEASE DUE TO LIPID RICH PLAQUE: ICD-10-CM

## 2025-08-22 DIAGNOSIS — I48.0 PAROXYSMAL ATRIAL FIBRILLATION (HCC): ICD-10-CM

## 2025-08-22 DIAGNOSIS — E78.2 MIXED HYPERLIPIDEMIA: ICD-10-CM

## 2025-08-22 DIAGNOSIS — I25.83 CORONARY ARTERY DISEASE DUE TO LIPID RICH PLAQUE: ICD-10-CM

## 2025-08-22 DIAGNOSIS — Z95.0 S/P PLACEMENT OF CARDIAC PACEMAKER: ICD-10-CM

## 2025-08-22 DIAGNOSIS — Z95.1 HISTORY OF CORONARY ARTERY BYPASS GRAFT: Primary | ICD-10-CM

## 2025-08-22 DIAGNOSIS — I10 PRIMARY HYPERTENSION: ICD-10-CM

## 2025-08-22 DIAGNOSIS — R94.39 ABNORMAL CARDIOVASCULAR STRESS TEST: ICD-10-CM

## 2025-08-22 DIAGNOSIS — I50.32 CHRONIC DIASTOLIC CONGESTIVE HEART FAILURE (HCC): ICD-10-CM

## 2025-08-22 DIAGNOSIS — I25.10 CORONARY ARTERY DISEASE INVOLVING NATIVE CORONARY ARTERY OF NATIVE HEART WITHOUT ANGINA PECTORIS: ICD-10-CM

## 2025-08-22 PROCEDURE — G2211 COMPLEX E/M VISIT ADD ON: HCPCS | Performed by: INTERNAL MEDICINE

## 2025-08-22 PROCEDURE — 1123F ACP DISCUSS/DSCN MKR DOCD: CPT | Performed by: INTERNAL MEDICINE

## 2025-08-22 PROCEDURE — 1159F MED LIST DOCD IN RCRD: CPT | Performed by: INTERNAL MEDICINE

## 2025-08-22 PROCEDURE — 99215 OFFICE O/P EST HI 40 MIN: CPT | Performed by: INTERNAL MEDICINE

## 2025-08-29 ENCOUNTER — TELEPHONE (OUTPATIENT)
Dept: CARDIOLOGY CLINIC | Age: 89
End: 2025-08-29

## 2025-09-05 ENCOUNTER — HOSPITAL ENCOUNTER (OUTPATIENT)
Age: 89
Setting detail: OUTPATIENT SURGERY
Discharge: HOME | End: 2025-09-05
Attending: STUDENT IN AN ORGANIZED HEALTH CARE EDUCATION/TRAINING PROGRAM | Admitting: STUDENT IN AN ORGANIZED HEALTH CARE EDUCATION/TRAINING PROGRAM
Payer: MEDICARE

## 2025-09-05 VITALS
SYSTOLIC BLOOD PRESSURE: 165 MMHG | HEIGHT: 70 IN | DIASTOLIC BLOOD PRESSURE: 60 MMHG | WEIGHT: 211 LBS | BODY MASS INDEX: 30.21 KG/M2 | HEART RATE: 78 BPM | OXYGEN SATURATION: 98 % | TEMPERATURE: 97.8 F | RESPIRATION RATE: 16 BRPM

## 2025-09-05 DIAGNOSIS — R94.39 ABNORMAL CARDIOVASCULAR STRESS TEST: ICD-10-CM

## 2025-09-05 LAB
ECHO BSA: 2.17 M2
EKG ATRIAL RATE: 60 BPM
EKG DIAGNOSIS: NORMAL
EKG P AXIS: -6 DEGREES
EKG P-R INTERVAL: 242 MS
EKG Q-T INTERVAL: 432 MS
EKG QRS DURATION: 144 MS
EKG QTC CALCULATION (BAZETT): 486 MS
EKG R AXIS: 24 DEGREES
EKG T AXIS: 41 DEGREES
EKG VENTRICULAR RATE: 76 BPM

## 2025-09-05 PROCEDURE — 99153 MOD SED SAME PHYS/QHP EA: CPT | Performed by: STUDENT IN AN ORGANIZED HEALTH CARE EDUCATION/TRAINING PROGRAM

## 2025-09-05 PROCEDURE — 7100000011 HC PHASE II RECOVERY - ADDTL 15 MIN: Performed by: STUDENT IN AN ORGANIZED HEALTH CARE EDUCATION/TRAINING PROGRAM

## 2025-09-05 PROCEDURE — 99152 MOD SED SAME PHYS/QHP 5/>YRS: CPT | Performed by: STUDENT IN AN ORGANIZED HEALTH CARE EDUCATION/TRAINING PROGRAM

## 2025-09-05 PROCEDURE — 6360000004 HC RX CONTRAST MEDICATION: Performed by: STUDENT IN AN ORGANIZED HEALTH CARE EDUCATION/TRAINING PROGRAM

## 2025-09-05 PROCEDURE — 7100000010 HC PHASE II RECOVERY - FIRST 15 MIN: Performed by: STUDENT IN AN ORGANIZED HEALTH CARE EDUCATION/TRAINING PROGRAM

## 2025-09-05 PROCEDURE — C1894 INTRO/SHEATH, NON-LASER: HCPCS | Performed by: STUDENT IN AN ORGANIZED HEALTH CARE EDUCATION/TRAINING PROGRAM

## 2025-09-05 PROCEDURE — C1769 GUIDE WIRE: HCPCS | Performed by: STUDENT IN AN ORGANIZED HEALTH CARE EDUCATION/TRAINING PROGRAM

## 2025-09-05 PROCEDURE — 2709999900 HC NON-CHARGEABLE SUPPLY: Performed by: STUDENT IN AN ORGANIZED HEALTH CARE EDUCATION/TRAINING PROGRAM

## 2025-09-05 PROCEDURE — 93461 R&L HRT ART/VENTRICLE ANGIO: CPT | Performed by: STUDENT IN AN ORGANIZED HEALTH CARE EDUCATION/TRAINING PROGRAM

## 2025-09-05 PROCEDURE — C1751 CATH, INF, PER/CENT/MIDLINE: HCPCS | Performed by: STUDENT IN AN ORGANIZED HEALTH CARE EDUCATION/TRAINING PROGRAM

## 2025-09-05 PROCEDURE — 6360000002 HC RX W HCPCS: Performed by: STUDENT IN AN ORGANIZED HEALTH CARE EDUCATION/TRAINING PROGRAM

## 2025-09-05 PROCEDURE — C1760 CLOSURE DEV, VASC: HCPCS | Performed by: STUDENT IN AN ORGANIZED HEALTH CARE EDUCATION/TRAINING PROGRAM

## 2025-09-05 RX ORDER — IOPAMIDOL 755 MG/ML
INJECTION, SOLUTION INTRAVASCULAR PRN
Status: DISCONTINUED | OUTPATIENT
Start: 2025-09-05 | End: 2025-09-05 | Stop reason: HOSPADM

## 2025-09-05 RX ORDER — SODIUM CHLORIDE 0.9 % (FLUSH) 0.9 %
5-40 SYRINGE (ML) INJECTION EVERY 12 HOURS SCHEDULED
Status: DISCONTINUED | OUTPATIENT
Start: 2025-09-05 | End: 2025-09-05 | Stop reason: HOSPADM

## 2025-09-05 RX ORDER — LIDOCAINE HYDROCHLORIDE 10 MG/ML
INJECTION, SOLUTION INFILTRATION; PERINEURAL PRN
Status: DISCONTINUED | OUTPATIENT
Start: 2025-09-05 | End: 2025-09-05 | Stop reason: HOSPADM

## 2025-09-05 RX ORDER — MIDAZOLAM HYDROCHLORIDE 1 MG/ML
INJECTION, SOLUTION INTRAMUSCULAR; INTRAVENOUS PRN
Status: DISCONTINUED | OUTPATIENT
Start: 2025-09-05 | End: 2025-09-05 | Stop reason: HOSPADM

## 2025-09-05 RX ORDER — SODIUM CHLORIDE 0.9 % (FLUSH) 0.9 %
5-40 SYRINGE (ML) INJECTION PRN
Status: DISCONTINUED | OUTPATIENT
Start: 2025-09-05 | End: 2025-09-05 | Stop reason: HOSPADM

## 2025-09-05 RX ORDER — SODIUM CHLORIDE 9 MG/ML
INJECTION, SOLUTION INTRAVENOUS PRN
Status: DISCONTINUED | OUTPATIENT
Start: 2025-09-05 | End: 2025-09-05 | Stop reason: HOSPADM

## 2025-09-05 ASSESSMENT — PAIN SCALES - GENERAL
PAINLEVEL_OUTOF10: 0
PAINLEVEL_OUTOF10: 0

## (undated) DEVICE — TRAP SPEC RETRV CLR PLAS POLYP IN LN SUCT QUIK CTCH

## (undated) DEVICE — TUBING IRRIG COMPATIBLE W ERBE MEDIVATOR PMP HYDR

## (undated) DEVICE — AIR/WATER CLEANING ADAPTER FOR OLYMPUS® GI ENDOSCOPE: Brand: BULLDOG®

## (undated) DEVICE — CAP WATER BTL AIR TBNG L 16 IN CO2 TBNG L 48 IN ENDOSCP

## (undated) DEVICE — SNARE COLD DIAMOND 15MM THIN

## (undated) DEVICE — BW-412T DISP COMBO CLEANING BRUSH: Brand: SINGLE USE COMBINATION CLEANING BRUSH

## (undated) DEVICE — Device: Brand: DISPOSABLE ELECTROSURGICAL SNARE

## (undated) DEVICE — NEEDLE INJ 25GA P5MM SHFT L230CM SHTH DIA2.5MM S STL TEF

## (undated) DEVICE — SOLUTION IRRIG 500ML STRL H2O NONPYROGENIC

## (undated) DEVICE — SINGLE USE AIR/WATER, SUCTION AND BIOPSY VALVES SET: Brand: ORCAPOD™

## (undated) DEVICE — ELECTRODE PT RET AD L9FT HI MOIST COND ADH HYDRGEL CORDED

## (undated) DEVICE — ENDOSCOPIC KIT 6X3/16 FT COLON W/ 1.1 OZ 2 GWN W/O BRSH

## (undated) DEVICE — AGENT HEMOSTATIC POLYSACCHARIDE 230 CM 2.8 MM 5 GM ENDOCLOT